# Patient Record
Sex: FEMALE | Race: WHITE | NOT HISPANIC OR LATINO
[De-identification: names, ages, dates, MRNs, and addresses within clinical notes are randomized per-mention and may not be internally consistent; named-entity substitution may affect disease eponyms.]

---

## 2017-01-04 ENCOUNTER — APPOINTMENT (OUTPATIENT)
Dept: CARDIOLOGY | Facility: CLINIC | Age: 82
End: 2017-01-04

## 2017-01-04 ENCOUNTER — NON-APPOINTMENT (OUTPATIENT)
Age: 82
End: 2017-01-04

## 2017-01-04 VITALS
HEIGHT: 58 IN | WEIGHT: 115 LBS | DIASTOLIC BLOOD PRESSURE: 75 MMHG | BODY MASS INDEX: 24.14 KG/M2 | HEART RATE: 59 BPM | SYSTOLIC BLOOD PRESSURE: 136 MMHG

## 2017-08-17 ENCOUNTER — FORM ENCOUNTER (OUTPATIENT)
Age: 82
End: 2017-08-17

## 2017-08-18 ENCOUNTER — APPOINTMENT (OUTPATIENT)
Dept: MAMMOGRAPHY | Facility: CLINIC | Age: 82
End: 2017-08-18

## 2017-08-18 ENCOUNTER — OUTPATIENT (OUTPATIENT)
Dept: OUTPATIENT SERVICES | Facility: HOSPITAL | Age: 82
LOS: 1 days | End: 2017-08-18
Payer: MEDICARE

## 2017-08-18 DIAGNOSIS — Z00.8 ENCOUNTER FOR OTHER GENERAL EXAMINATION: ICD-10-CM

## 2017-08-18 PROCEDURE — 77063 BREAST TOMOSYNTHESIS BI: CPT | Mod: 26

## 2017-08-18 PROCEDURE — 77063 BREAST TOMOSYNTHESIS BI: CPT

## 2017-08-18 PROCEDURE — 77067 SCR MAMMO BI INCL CAD: CPT

## 2017-08-18 PROCEDURE — G0202: CPT | Mod: 26

## 2017-08-21 ENCOUNTER — FORM ENCOUNTER (OUTPATIENT)
Age: 82
End: 2017-08-21

## 2017-08-22 ENCOUNTER — APPOINTMENT (OUTPATIENT)
Dept: SURGICAL ONCOLOGY | Facility: CLINIC | Age: 82
End: 2017-08-22
Payer: MEDICARE

## 2017-08-22 ENCOUNTER — APPOINTMENT (OUTPATIENT)
Dept: ULTRASOUND IMAGING | Facility: IMAGING CENTER | Age: 82
End: 2017-08-22
Payer: MEDICARE

## 2017-08-22 ENCOUNTER — OUTPATIENT (OUTPATIENT)
Dept: OUTPATIENT SERVICES | Facility: HOSPITAL | Age: 82
LOS: 1 days | End: 2017-08-22
Payer: MEDICARE

## 2017-08-22 ENCOUNTER — APPOINTMENT (OUTPATIENT)
Dept: MAMMOGRAPHY | Facility: IMAGING CENTER | Age: 82
End: 2017-08-22
Payer: MEDICARE

## 2017-08-22 VITALS
BODY MASS INDEX: 23.72 KG/M2 | HEART RATE: 77 BPM | DIASTOLIC BLOOD PRESSURE: 79 MMHG | RESPIRATION RATE: 16 BRPM | WEIGHT: 113 LBS | HEIGHT: 58 IN | OXYGEN SATURATION: 97 % | SYSTOLIC BLOOD PRESSURE: 118 MMHG

## 2017-08-22 DIAGNOSIS — Z00.8 ENCOUNTER FOR OTHER GENERAL EXAMINATION: ICD-10-CM

## 2017-08-22 PROCEDURE — G0206: CPT | Mod: 26,LT

## 2017-08-22 PROCEDURE — 77065 DX MAMMO INCL CAD UNI: CPT

## 2017-08-22 PROCEDURE — 76642 ULTRASOUND BREAST LIMITED: CPT

## 2017-08-22 PROCEDURE — G0279: CPT | Mod: 26

## 2017-08-22 PROCEDURE — 99214 OFFICE O/P EST MOD 30 MIN: CPT

## 2017-08-22 PROCEDURE — 76642 ULTRASOUND BREAST LIMITED: CPT | Mod: 26,LT

## 2017-08-22 PROCEDURE — G0279: CPT

## 2017-08-24 ENCOUNTER — APPOINTMENT (OUTPATIENT)
Dept: CARDIOLOGY | Facility: CLINIC | Age: 82
End: 2017-08-24
Payer: MEDICARE

## 2017-08-24 ENCOUNTER — NON-APPOINTMENT (OUTPATIENT)
Age: 82
End: 2017-08-24

## 2017-08-24 VITALS
OXYGEN SATURATION: 96 % | DIASTOLIC BLOOD PRESSURE: 79 MMHG | BODY MASS INDEX: 24.14 KG/M2 | WEIGHT: 115 LBS | HEART RATE: 62 BPM | SYSTOLIC BLOOD PRESSURE: 137 MMHG | HEIGHT: 58 IN

## 2017-08-24 VITALS — DIASTOLIC BLOOD PRESSURE: 72 MMHG | SYSTOLIC BLOOD PRESSURE: 130 MMHG

## 2017-08-24 PROCEDURE — 99215 OFFICE O/P EST HI 40 MIN: CPT

## 2017-08-24 PROCEDURE — 93000 ELECTROCARDIOGRAM COMPLETE: CPT

## 2017-08-26 ENCOUNTER — APPOINTMENT (OUTPATIENT)
Dept: CARDIOLOGY | Facility: CLINIC | Age: 82
End: 2017-08-26
Payer: MEDICARE

## 2017-08-26 PROCEDURE — 93880 EXTRACRANIAL BILAT STUDY: CPT

## 2017-09-08 ENCOUNTER — APPOINTMENT (OUTPATIENT)
Dept: CARDIOLOGY | Facility: CLINIC | Age: 82
End: 2017-09-08
Payer: MEDICARE

## 2017-09-08 PROCEDURE — 93306 TTE W/DOPPLER COMPLETE: CPT

## 2018-02-26 ENCOUNTER — FORM ENCOUNTER (OUTPATIENT)
Age: 83
End: 2018-02-26

## 2018-02-27 ENCOUNTER — OUTPATIENT (OUTPATIENT)
Dept: OUTPATIENT SERVICES | Facility: HOSPITAL | Age: 83
LOS: 1 days | End: 2018-02-27
Payer: MEDICARE

## 2018-02-27 ENCOUNTER — APPOINTMENT (OUTPATIENT)
Dept: ULTRASOUND IMAGING | Facility: IMAGING CENTER | Age: 83
End: 2018-02-27
Payer: MEDICARE

## 2018-02-27 DIAGNOSIS — Z00.8 ENCOUNTER FOR OTHER GENERAL EXAMINATION: ICD-10-CM

## 2018-02-27 PROCEDURE — 76642 ULTRASOUND BREAST LIMITED: CPT | Mod: 26,LT

## 2018-02-27 PROCEDURE — 76642 ULTRASOUND BREAST LIMITED: CPT

## 2018-08-23 ENCOUNTER — FORM ENCOUNTER (OUTPATIENT)
Age: 83
End: 2018-08-23

## 2018-08-24 ENCOUNTER — OUTPATIENT (OUTPATIENT)
Dept: OUTPATIENT SERVICES | Facility: HOSPITAL | Age: 83
LOS: 1 days | End: 2018-08-24
Payer: MEDICARE

## 2018-08-24 ENCOUNTER — APPOINTMENT (OUTPATIENT)
Dept: MAMMOGRAPHY | Facility: CLINIC | Age: 83
End: 2018-08-24

## 2018-08-24 ENCOUNTER — APPOINTMENT (OUTPATIENT)
Dept: ULTRASOUND IMAGING | Facility: CLINIC | Age: 83
End: 2018-08-24

## 2018-08-24 DIAGNOSIS — Z00.8 ENCOUNTER FOR OTHER GENERAL EXAMINATION: ICD-10-CM

## 2018-08-24 PROCEDURE — 76642 ULTRASOUND BREAST LIMITED: CPT | Mod: 26,LT

## 2018-08-24 PROCEDURE — 77067 SCR MAMMO BI INCL CAD: CPT | Mod: 26

## 2018-08-24 PROCEDURE — 77067 SCR MAMMO BI INCL CAD: CPT

## 2018-08-24 PROCEDURE — 77063 BREAST TOMOSYNTHESIS BI: CPT | Mod: 26

## 2018-08-24 PROCEDURE — 76642 ULTRASOUND BREAST LIMITED: CPT

## 2018-08-24 PROCEDURE — 77063 BREAST TOMOSYNTHESIS BI: CPT

## 2018-08-27 ENCOUNTER — NON-APPOINTMENT (OUTPATIENT)
Age: 83
End: 2018-08-27

## 2018-08-27 ENCOUNTER — APPOINTMENT (OUTPATIENT)
Dept: CARDIOLOGY | Facility: CLINIC | Age: 83
End: 2018-08-27
Payer: MEDICARE

## 2018-08-27 VITALS
BODY MASS INDEX: 24.14 KG/M2 | DIASTOLIC BLOOD PRESSURE: 77 MMHG | WEIGHT: 115 LBS | HEART RATE: 67 BPM | HEIGHT: 58 IN | SYSTOLIC BLOOD PRESSURE: 130 MMHG | OXYGEN SATURATION: 98 %

## 2018-08-27 DIAGNOSIS — E04.1 NONTOXIC SINGLE THYROID NODULE: ICD-10-CM

## 2018-08-27 DIAGNOSIS — I65.29 OCCLUSION AND STENOSIS OF UNSPECIFIED CAROTID ARTERY: ICD-10-CM

## 2018-08-27 DIAGNOSIS — E78.5 HYPERLIPIDEMIA, UNSPECIFIED: ICD-10-CM

## 2018-08-27 PROCEDURE — 99215 OFFICE O/P EST HI 40 MIN: CPT

## 2018-08-27 PROCEDURE — 93000 ELECTROCARDIOGRAM COMPLETE: CPT

## 2018-08-27 RX ORDER — ASCORBIC ACID 1000 MG
1000 TABLET ORAL
Refills: 0 | Status: DISCONTINUED | COMMUNITY
End: 2018-08-27

## 2018-08-28 ENCOUNTER — APPOINTMENT (OUTPATIENT)
Dept: SURGICAL ONCOLOGY | Facility: CLINIC | Age: 83
End: 2018-08-28
Payer: MEDICARE

## 2018-08-28 VITALS
HEART RATE: 66 BPM | BODY MASS INDEX: 24.14 KG/M2 | OXYGEN SATURATION: 98 % | HEIGHT: 58 IN | WEIGHT: 115 LBS | DIASTOLIC BLOOD PRESSURE: 79 MMHG | SYSTOLIC BLOOD PRESSURE: 128 MMHG | RESPIRATION RATE: 16 BRPM

## 2018-08-28 DIAGNOSIS — R92.2 INCONCLUSIVE MAMMOGRAM: ICD-10-CM

## 2018-08-28 PROCEDURE — 99213 OFFICE O/P EST LOW 20 MIN: CPT

## 2018-09-06 ENCOUNTER — APPOINTMENT (OUTPATIENT)
Dept: CARDIOLOGY | Facility: CLINIC | Age: 83
End: 2018-09-06
Payer: MEDICARE

## 2018-09-06 PROCEDURE — 93306 TTE W/DOPPLER COMPLETE: CPT

## 2018-09-13 ENCOUNTER — APPOINTMENT (OUTPATIENT)
Dept: ULTRASOUND IMAGING | Facility: IMAGING CENTER | Age: 83
End: 2018-09-13

## 2018-09-13 ENCOUNTER — APPOINTMENT (OUTPATIENT)
Dept: MAMMOGRAPHY | Facility: IMAGING CENTER | Age: 83
End: 2018-09-13

## 2019-07-23 ENCOUNTER — OTHER (OUTPATIENT)
Age: 84
End: 2019-07-23

## 2019-07-26 ENCOUNTER — APPOINTMENT (OUTPATIENT)
Dept: CARDIOLOGY | Facility: CLINIC | Age: 84
End: 2019-07-26
Payer: MEDICARE

## 2019-07-26 PROCEDURE — 93970 EXTREMITY STUDY: CPT

## 2019-08-27 ENCOUNTER — NON-APPOINTMENT (OUTPATIENT)
Age: 84
End: 2019-08-27

## 2019-08-27 ENCOUNTER — APPOINTMENT (OUTPATIENT)
Dept: CARDIOLOGY | Facility: CLINIC | Age: 84
End: 2019-08-27
Payer: MEDICARE

## 2019-08-27 VITALS
SYSTOLIC BLOOD PRESSURE: 162 MMHG | DIASTOLIC BLOOD PRESSURE: 78 MMHG | BODY MASS INDEX: 24.14 KG/M2 | WEIGHT: 115 LBS | HEIGHT: 58 IN | HEART RATE: 61 BPM

## 2019-08-27 PROCEDURE — 99215 OFFICE O/P EST HI 40 MIN: CPT

## 2019-08-27 PROCEDURE — 93000 ELECTROCARDIOGRAM COMPLETE: CPT

## 2019-08-27 NOTE — DISCUSSION/SUMMARY
[FreeTextEntry1] : Mrs. So has recently noted an increased degree of lower extremity swelling (L>R), which improved to a degree after therapy with Norvasc was discontinued on 7/23/19. She has, however, had persistent lower extremity swelling, which I suspect is on the basis of lower extremity venous disease. She does not describe having experienced any signs or symptoms to suggest the development of an anginal syndrome, congestive heart failure, or a hemodynamically-compromising arrhythmia. Her cardiac examination today is unchanged from her previous visit with me, again remarkable for a soft systolic ejection murmur, as well as a mid-systolic click and mid-systolic murmur heard at the lower left sternal border and in the apical region, related to mitral regurgitation in association with her known history of mitral valve prolapse. She is exhibiting 2-3+ minimally pitting left ankle swelling and 1+ non-pitting right ankle swelling on examination today. Her blood pressure reading is moderately elevated today. Her electrocardiogram today reveals sinus rhythm with left axis deviation, a right bundle branch block pattern, and non-specific ST-T wave abnormalities, essentially unchanged from her previous office tracing.\par \par I have recommended to the patient that she continue Lasix at a dosage of 40 mg daily for the time being. I have referred her to a vascular specialist to be evaluated for venous insufficiency of the lower extremities.\par \par In an effort to more optimally control her blood pressure, I have instructed the patient to increase the dosage of losartan from 50 mg daily to 100 mg daily, and I have electronically prescribed a higher dosage of this medication to her pharmacy for her today. I have asked her call me if she should have any difficulty tolerating the increased dosage of losartan.\par \par The patient anticipates having coughing up blood testing performed through the office of her primary care provider, Dr. Palomino, in the near future, including a fasting lipid profile and chemistry screen. I have asked her to have a copy of the report forwarded to my office for my review.\par \par I am referring the patient for follow-up echocardiography at this point to reassess cardiac structural integrity, left ventricular function, valvular morphology and function, and the pulmonary artery systolic pressure. The patient is going to make arrangements to have this study performed throughout her office, and I will telephone her to discuss the findings, once the study has been completed.\par \par The importance of proper dietary habits and regular exercise as tolerated was discussed with the patient today.\par \par I have reviewed the findings of the carotid artery Doppler study of 8/26/17 in detail with the patient today, noting that the study revealed mild plaque formation involving the proximal portion of the left internal carotid artery. In addition, a left-sided thyroid nodule was incidentally discovered, for which the patient has since been following with an endocrinologist.\par \par I have asked the patient to call me should she have any questions or problems pertaining to these matters, and especially if she should experience any concerning symptoms. I have otherwise asked her to return to the office for follow-up cardiac evaluation and blood pressure reassessment in one month, provided she remains clinically stable in the interim.
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
## 2019-08-27 NOTE — PHYSICAL EXAM
[General Appearance - In No Acute Distress] : no acute distress [Normal Conjunctiva] : the conjunctiva exhibited no abnormalities [No Oral Pallor] : no oral pallor [Respiration, Rhythm And Depth] : normal respiratory rhythm and effort [Auscultation Breath Sounds / Voice Sounds] : lungs were clear to auscultation bilaterally [Bowel Sounds] : normal bowel sounds [Abdomen Tenderness] : non-tender [Abnormal Walk] : normal gait [Cyanosis, Localized] : no localized cyanosis [] : no rash [Oriented To Time, Place, And Person] : oriented to person, place, and time [Not Palpable] : not palpable [No Precordial Heave] : no precordial heave was noted [Normal Rate] : normal [Rhythm Regular] : regular [Normal S1] : normal S1 [Normal S2] : normal S2 [No Gallop] : no gallop heard [Click] : a ~M click was heard [Mid] : mid [I] : a grade 1 [2+] : left 2+ [No Abnormalities] : the abdominal aorta was not enlarged and no bruit was heard [Nonpitting Edema] : nonpitting edema present [Rt] : varicose veins of the right leg noted [Lt] : varicose veins of the left leg noted [FreeTextEntry1] : no significant JVD is appreciated at a 45° angle [Apical Thrill] : no thrill palpable at the apex [Pericardial Rub] : no pericardial rub [Right Carotid Bruit] : no bruit heard over the right carotid [Left Carotid Bruit] : no bruit heard over the left carotid

## 2019-09-04 ENCOUNTER — APPOINTMENT (OUTPATIENT)
Dept: CARDIOLOGY | Facility: CLINIC | Age: 84
End: 2019-09-04
Payer: MEDICARE

## 2019-09-04 PROCEDURE — 93306 TTE W/DOPPLER COMPLETE: CPT

## 2019-09-12 ENCOUNTER — FORM ENCOUNTER (OUTPATIENT)
Age: 84
End: 2019-09-12

## 2019-09-13 ENCOUNTER — APPOINTMENT (OUTPATIENT)
Dept: ULTRASOUND IMAGING | Facility: IMAGING CENTER | Age: 84
End: 2019-09-13
Payer: MEDICARE

## 2019-09-13 ENCOUNTER — OUTPATIENT (OUTPATIENT)
Dept: OUTPATIENT SERVICES | Facility: HOSPITAL | Age: 84
LOS: 1 days | End: 2019-09-13
Payer: MEDICARE

## 2019-09-13 ENCOUNTER — APPOINTMENT (OUTPATIENT)
Dept: MAMMOGRAPHY | Facility: IMAGING CENTER | Age: 84
End: 2019-09-13
Payer: MEDICARE

## 2019-09-13 DIAGNOSIS — Z00.8 ENCOUNTER FOR OTHER GENERAL EXAMINATION: ICD-10-CM

## 2019-09-13 PROCEDURE — 76641 ULTRASOUND BREAST COMPLETE: CPT | Mod: 26,50

## 2019-09-13 PROCEDURE — 77067 SCR MAMMO BI INCL CAD: CPT

## 2019-09-13 PROCEDURE — 77063 BREAST TOMOSYNTHESIS BI: CPT | Mod: 26

## 2019-09-13 PROCEDURE — 77067 SCR MAMMO BI INCL CAD: CPT | Mod: 26

## 2019-09-13 PROCEDURE — 77063 BREAST TOMOSYNTHESIS BI: CPT

## 2019-09-13 PROCEDURE — 76641 ULTRASOUND BREAST COMPLETE: CPT

## 2019-09-17 ENCOUNTER — APPOINTMENT (OUTPATIENT)
Dept: SURGICAL ONCOLOGY | Facility: CLINIC | Age: 84
End: 2019-09-17

## 2019-09-24 ENCOUNTER — TRANSCRIPTION ENCOUNTER (OUTPATIENT)
Age: 84
End: 2019-09-24

## 2019-09-24 ENCOUNTER — INPATIENT (INPATIENT)
Facility: HOSPITAL | Age: 84
LOS: 3 days | Discharge: HOME | End: 2019-09-28
Payer: MEDICARE

## 2019-09-24 VITALS
TEMPERATURE: 98 F | RESPIRATION RATE: 18 BRPM | OXYGEN SATURATION: 100 % | HEART RATE: 68 BPM | DIASTOLIC BLOOD PRESSURE: 81 MMHG | SYSTOLIC BLOOD PRESSURE: 139 MMHG

## 2019-09-24 DIAGNOSIS — K62.3 RECTAL PROLAPSE: ICD-10-CM

## 2019-09-24 LAB
ANION GAP SERPL CALC-SCNC: 16 MMO/L — HIGH (ref 7–14)
APPEARANCE UR: CLEAR — SIGNIFICANT CHANGE UP
APTT BLD: 28.2 SEC — SIGNIFICANT CHANGE UP (ref 27.5–36.3)
BILIRUB UR-MCNC: NEGATIVE — SIGNIFICANT CHANGE UP
BLD GP AB SCN SERPL QL: NEGATIVE — SIGNIFICANT CHANGE UP
BLOOD UR QL VISUAL: NEGATIVE — SIGNIFICANT CHANGE UP
BUN SERPL-MCNC: 17 MG/DL — SIGNIFICANT CHANGE UP (ref 7–23)
CALCIUM SERPL-MCNC: 9.8 MG/DL — SIGNIFICANT CHANGE UP (ref 8.4–10.5)
CHLORIDE SERPL-SCNC: 99 MMOL/L — SIGNIFICANT CHANGE UP (ref 98–107)
CO2 SERPL-SCNC: 23 MMOL/L — SIGNIFICANT CHANGE UP (ref 22–31)
COLOR SPEC: COLORLESS — SIGNIFICANT CHANGE UP
CREAT SERPL-MCNC: 0.74 MG/DL — SIGNIFICANT CHANGE UP (ref 0.5–1.3)
GLUCOSE SERPL-MCNC: 108 MG/DL — HIGH (ref 70–99)
GLUCOSE UR-MCNC: NEGATIVE — SIGNIFICANT CHANGE UP
HCT VFR BLD CALC: 39.7 % — SIGNIFICANT CHANGE UP (ref 34.5–45)
HGB BLD-MCNC: 13.3 G/DL — SIGNIFICANT CHANGE UP (ref 11.5–15.5)
INR BLD: 0.99 — SIGNIFICANT CHANGE UP (ref 0.88–1.17)
KETONES UR-MCNC: NEGATIVE — SIGNIFICANT CHANGE UP
LEUKOCYTE ESTERASE UR-ACNC: NEGATIVE — SIGNIFICANT CHANGE UP
MAGNESIUM SERPL-MCNC: 2.4 MG/DL — SIGNIFICANT CHANGE UP (ref 1.6–2.6)
MCHC RBC-ENTMCNC: 32.2 PG — SIGNIFICANT CHANGE UP (ref 27–34)
MCHC RBC-ENTMCNC: 33.5 % — SIGNIFICANT CHANGE UP (ref 32–36)
MCV RBC AUTO: 96.1 FL — SIGNIFICANT CHANGE UP (ref 80–100)
NITRITE UR-MCNC: NEGATIVE — SIGNIFICANT CHANGE UP
NRBC # FLD: 0.03 K/UL — SIGNIFICANT CHANGE UP (ref 0–0)
PH UR: 8 — SIGNIFICANT CHANGE UP (ref 5–8)
PHOSPHATE SERPL-MCNC: 3.2 MG/DL — SIGNIFICANT CHANGE UP (ref 2.5–4.5)
PLATELET # BLD AUTO: 337 K/UL — SIGNIFICANT CHANGE UP (ref 150–400)
PMV BLD: 9.7 FL — SIGNIFICANT CHANGE UP (ref 7–13)
POTASSIUM SERPL-MCNC: 4.2 MMOL/L — SIGNIFICANT CHANGE UP (ref 3.5–5.3)
POTASSIUM SERPL-SCNC: 4.2 MMOL/L — SIGNIFICANT CHANGE UP (ref 3.5–5.3)
PROT UR-MCNC: NEGATIVE — SIGNIFICANT CHANGE UP
PROTHROM AB SERPL-ACNC: 11.3 SEC — SIGNIFICANT CHANGE UP (ref 9.8–13.1)
RBC # BLD: 4.13 M/UL — SIGNIFICANT CHANGE UP (ref 3.8–5.2)
RBC # FLD: 13.4 % — SIGNIFICANT CHANGE UP (ref 10.3–14.5)
RH IG SCN BLD-IMP: POSITIVE — SIGNIFICANT CHANGE UP
RH IG SCN BLD-IMP: POSITIVE — SIGNIFICANT CHANGE UP
SODIUM SERPL-SCNC: 138 MMOL/L — SIGNIFICANT CHANGE UP (ref 135–145)
SP GR SPEC: 1 — SIGNIFICANT CHANGE UP (ref 1–1.04)
UROBILINOGEN FLD QL: NORMAL — SIGNIFICANT CHANGE UP
WBC # BLD: 5.66 K/UL — SIGNIFICANT CHANGE UP (ref 3.8–10.5)
WBC # FLD AUTO: 5.66 K/UL — SIGNIFICANT CHANGE UP (ref 3.8–10.5)

## 2019-09-24 PROCEDURE — 71046 X-RAY EXAM CHEST 2 VIEWS: CPT | Mod: 26

## 2019-09-24 PROCEDURE — 71045 X-RAY EXAM CHEST 1 VIEW: CPT | Mod: 26

## 2019-09-24 PROCEDURE — 93010 ELECTROCARDIOGRAM REPORT: CPT

## 2019-09-24 RX ORDER — INFLUENZA VIRUS VACCINE 15; 15; 15; 15 UG/.5ML; UG/.5ML; UG/.5ML; UG/.5ML
0.5 SUSPENSION INTRAMUSCULAR ONCE
Refills: 0 | Status: COMPLETED | OUTPATIENT
Start: 2019-09-24 | End: 2019-09-28

## 2019-09-24 RX ORDER — HEPARIN SODIUM 5000 [USP'U]/ML
5000 INJECTION INTRAVENOUS; SUBCUTANEOUS EVERY 8 HOURS
Refills: 0 | Status: DISCONTINUED | OUTPATIENT
Start: 2019-09-24 | End: 2019-09-25

## 2019-09-24 RX ORDER — LOSARTAN POTASSIUM 100 MG/1
50 TABLET, FILM COATED ORAL DAILY
Refills: 0 | Status: DISCONTINUED | OUTPATIENT
Start: 2019-09-25 | End: 2019-09-28

## 2019-09-24 RX ORDER — METRONIDAZOLE 500 MG
500 TABLET ORAL
Refills: 0 | Status: DISCONTINUED | OUTPATIENT
Start: 2019-09-24 | End: 2019-09-25

## 2019-09-24 RX ORDER — SOD SULF/SODIUM/NAHCO3/KCL/PEG
2000 SOLUTION, RECONSTITUTED, ORAL ORAL ONCE
Refills: 0 | Status: COMPLETED | OUTPATIENT
Start: 2019-09-24 | End: 2019-09-24

## 2019-09-24 RX ORDER — SODIUM CHLORIDE 9 MG/ML
1000 INJECTION, SOLUTION INTRAVENOUS
Refills: 0 | Status: DISCONTINUED | OUTPATIENT
Start: 2019-09-24 | End: 2019-09-26

## 2019-09-24 RX ORDER — NEOMYCIN SULFATE 500 MG/1
1000 TABLET ORAL
Refills: 0 | Status: DISCONTINUED | OUTPATIENT
Start: 2019-09-24 | End: 2019-09-25

## 2019-09-24 RX ADMIN — SODIUM CHLORIDE 50 MILLILITER(S): 9 INJECTION, SOLUTION INTRAVENOUS at 14:00

## 2019-09-24 RX ADMIN — Medication 500 MILLIGRAM(S): at 15:11

## 2019-09-24 RX ADMIN — NEOMYCIN SULFATE 1000 MILLIGRAM(S): 500 TABLET ORAL at 15:10

## 2019-09-24 RX ADMIN — Medication 2000 MILLILITER(S): at 16:18

## 2019-09-24 RX ADMIN — HEPARIN SODIUM 5000 UNIT(S): 5000 INJECTION INTRAVENOUS; SUBCUTANEOUS at 22:18

## 2019-09-24 RX ADMIN — Medication 500 MILLIGRAM(S): at 22:18

## 2019-09-24 RX ADMIN — HEPARIN SODIUM 5000 UNIT(S): 5000 INJECTION INTRAVENOUS; SUBCUTANEOUS at 15:32

## 2019-09-24 RX ADMIN — Medication 500 MILLIGRAM(S): at 15:32

## 2019-09-24 RX ADMIN — NEOMYCIN SULFATE 1000 MILLIGRAM(S): 500 TABLET ORAL at 15:31

## 2019-09-24 NOTE — H&P ADULT - NSHPPHYSICALEXAM_GEN_ALL_CORE
PHYSICAL EXAM:  GENERAL: NAD, well-developed  HEAD:  Atraumatic, Normocephalic  HEENT: EOMI, PERRLA, conjunctiva and sclera clear. R ear deafness, L ear w'/ functional hearing assist device  NECK: Supple, No JVD  CHEST/LUNG: No resp. distress. No accessory muscle usage  ABDOMEN: Soft, Nontender, Nondistended;   EXTREMITIES:  2+ Peripheral Pulses, No clubbing, cyanosis, or edema  PSYCH: AAOx3  NEUROLOGY: non-focal  SKIN: No rashes or lesions

## 2019-09-24 NOTE — PATIENT PROFILE ADULT - ABILITY TO HEAR (WITH HEARING AID OR HEARING APPLIANCE IF NORMALLY USED):
hearing aid left ear and R ear completely no hearing./Mildly to Moderately Impaired: difficulty hearing in some environments or speaker may need to increase volume or speak distinctly

## 2019-09-24 NOTE — H&P ADULT - NSICDXPASTSURGICALHX_GEN_ALL_CORE_FT
PAST SURGICAL HISTORY:  H/O:  Section 1957, 1060, 1963    Left Cataract     Left Stapedectomy     LIH Repair     Otosclerosis of left ear stapedectomy-     Otosclerosis of right ear S/P Stapedectomy- 10/12/1978    Right  Cataract  Excision     Right Stapedectomy     S/P colonoscopy with polypectomy times 2    S/P hernia repair ventral hernia with mesh- 10/2012    S/P left inguinal hernia repair with mesh     Surgical Treatment Otosclerosis  ? "Mobilization of Stapes"

## 2019-09-24 NOTE — H&P ADULT - HISTORY OF PRESENT ILLNESS
Pt. 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 Pt. 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   Here today as a direct admission for a prolapse resection. Pt. 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   She presents here today as a direct admission for a perineal approach prolapse resection w/ Dr. Vázquez. Pt. 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   She presents here today as a direct admission for a perineal approach prolapse resection w/ Dr. Vázquez after having had prolapse w/ bleeding for the past couple of weeks.  Denies HA, lightheadedness, palpitations, chest pain, SOB. Also denies N/V/D, any change in quality or frequency of stools.  She denies pain. Her daughter s

## 2019-09-24 NOTE — H&P ADULT - NSHPLABSRESULTS_GEN_ALL_CORE
Objective:  Vital Signs Last 24 Hrs  T(C): 36.3 (25 Sep 2019 02:06), Max: 36.8 (24 Sep 2019 15:11)  T(F): 97.4 (25 Sep 2019 02:06), Max: 98.2 (24 Sep 2019 15:11)  HR: 61 (25 Sep 2019 02:06) (57 - 68)  BP: 136/79 (25 Sep 2019 02:06) (131/74 - 156/77)  BP(mean): --  RR: 16 (25 Sep 2019 02:06) (16 - 18)  SpO2: 99% (25 Sep 2019 02:06) (98% - 100%)    I&O's Detail    24 Sep 2019 07:01  -  25 Sep 2019 04:41  --------------------------------------------------------  IN:    lactated ringers.: 950 mL    Oral Fluid: 240 mL  Total IN: 1190 mL    OUT:    Voided: 650 mL  Total OUT: 650 mL    Total NET: 540 mL          MEDICATIONS  (STANDING):  heparin  Injectable 5000 Unit(s) SubCutaneous every 8 hours  influenza   Vaccine 0.5 milliLiter(s) IntraMuscular once  lactated ringers. 1000 milliLiter(s) (50 mL/Hr) IV Continuous <Continuous>  losartan 50 milliGRAM(s) Oral daily  metroNIDAZOLE    Tablet 500 milliGRAM(s) Oral <User Schedule>  neomycin 1000 milliGRAM(s) Oral <User Schedule>    MEDICATIONS  (PRN):                            13.3   5.66  )-----------( 337      ( 24 Sep 2019 12:15 )             39.7       09-24    140  |  102  |  14  ----------------------------<  88  3.8   |  26  |  0.66    Ca    9.2      24 Sep 2019 23:30  Phos  2.8     09-24  Mg     2.3     09-24

## 2019-09-24 NOTE — CHART NOTE - NSCHARTNOTEFT_GEN_A_CORE
A Team Surgery    Spoke with pt cardiologist, Dr. Ogden, at [phone].  Pt went to cardiologist on 8/27 for annual appointment.  Echo performed unchanged from 9/6/18, EF 62%.  Pt in agreement with medical doctor (Dr. Villegas) clearance and has no objections to proceeding with planned procedure.    #[id number]

## 2019-09-24 NOTE — H&P ADULT - NSICDXPASTMEDICALHX_GEN_ALL_CORE_FT
PAST MEDICAL HISTORY:  Age Related Osteoporosis     Fungal infection left foot ; tx with cream ; x 3 weeks ; pt reports improvement    Heart murmur     History of Hypertension     [place] (hard of hearing)     MVP (mitral valve prolapse)     Otosclerosis

## 2019-09-25 ENCOUNTER — RESULT REVIEW (OUTPATIENT)
Age: 84
End: 2019-09-25

## 2019-09-25 ENCOUNTER — APPOINTMENT (OUTPATIENT)
Dept: CARDIOLOGY | Facility: CLINIC | Age: 84
End: 2019-09-25

## 2019-09-25 LAB
ANION GAP SERPL CALC-SCNC: 12 MMO/L — SIGNIFICANT CHANGE UP (ref 7–14)
ANION GAP SERPL CALC-SCNC: 15 MMO/L — HIGH (ref 7–14)
APTT BLD: 31.2 SEC — SIGNIFICANT CHANGE UP (ref 27.5–36.3)
BLD GP AB SCN SERPL QL: NEGATIVE — SIGNIFICANT CHANGE UP
BUN SERPL-MCNC: 11 MG/DL — SIGNIFICANT CHANGE UP (ref 7–23)
BUN SERPL-MCNC: 14 MG/DL — SIGNIFICANT CHANGE UP (ref 7–23)
CALCIUM SERPL-MCNC: 8.7 MG/DL — SIGNIFICANT CHANGE UP (ref 8.4–10.5)
CALCIUM SERPL-MCNC: 9.2 MG/DL — SIGNIFICANT CHANGE UP (ref 8.4–10.5)
CHLORIDE SERPL-SCNC: 102 MMOL/L — SIGNIFICANT CHANGE UP (ref 98–107)
CHLORIDE SERPL-SCNC: 103 MMOL/L — SIGNIFICANT CHANGE UP (ref 98–107)
CO2 SERPL-SCNC: 24 MMOL/L — SIGNIFICANT CHANGE UP (ref 22–31)
CO2 SERPL-SCNC: 26 MMOL/L — SIGNIFICANT CHANGE UP (ref 22–31)
CREAT SERPL-MCNC: 0.66 MG/DL — SIGNIFICANT CHANGE UP (ref 0.5–1.3)
CREAT SERPL-MCNC: 0.69 MG/DL — SIGNIFICANT CHANGE UP (ref 0.5–1.3)
GLUCOSE SERPL-MCNC: 85 MG/DL — SIGNIFICANT CHANGE UP (ref 70–99)
GLUCOSE SERPL-MCNC: 88 MG/DL — SIGNIFICANT CHANGE UP (ref 70–99)
HCT VFR BLD CALC: 37.3 % — SIGNIFICANT CHANGE UP (ref 34.5–45)
HGB BLD-MCNC: 12.5 G/DL — SIGNIFICANT CHANGE UP (ref 11.5–15.5)
INR BLD: 1.07 — SIGNIFICANT CHANGE UP (ref 0.88–1.17)
MAGNESIUM SERPL-MCNC: 2.2 MG/DL — SIGNIFICANT CHANGE UP (ref 1.6–2.6)
MAGNESIUM SERPL-MCNC: 2.3 MG/DL — SIGNIFICANT CHANGE UP (ref 1.6–2.6)
MCHC RBC-ENTMCNC: 32.3 PG — SIGNIFICANT CHANGE UP (ref 27–34)
MCHC RBC-ENTMCNC: 33.5 % — SIGNIFICANT CHANGE UP (ref 32–36)
MCV RBC AUTO: 96.4 FL — SIGNIFICANT CHANGE UP (ref 80–100)
NRBC # FLD: 0 K/UL — SIGNIFICANT CHANGE UP (ref 0–0)
PHOSPHATE SERPL-MCNC: 2.6 MG/DL — SIGNIFICANT CHANGE UP (ref 2.5–4.5)
PHOSPHATE SERPL-MCNC: 2.8 MG/DL — SIGNIFICANT CHANGE UP (ref 2.5–4.5)
PLATELET # BLD AUTO: 318 K/UL — SIGNIFICANT CHANGE UP (ref 150–400)
PMV BLD: 9.5 FL — SIGNIFICANT CHANGE UP (ref 7–13)
POTASSIUM SERPL-MCNC: 3.8 MMOL/L — SIGNIFICANT CHANGE UP (ref 3.5–5.3)
POTASSIUM SERPL-MCNC: 3.8 MMOL/L — SIGNIFICANT CHANGE UP (ref 3.5–5.3)
POTASSIUM SERPL-SCNC: 3.8 MMOL/L — SIGNIFICANT CHANGE UP (ref 3.5–5.3)
POTASSIUM SERPL-SCNC: 3.8 MMOL/L — SIGNIFICANT CHANGE UP (ref 3.5–5.3)
PROTHROM AB SERPL-ACNC: 12.2 SEC — SIGNIFICANT CHANGE UP (ref 9.8–13.1)
RBC # BLD: 3.87 M/UL — SIGNIFICANT CHANGE UP (ref 3.8–5.2)
RBC # FLD: 13.3 % — SIGNIFICANT CHANGE UP (ref 10.3–14.5)
RH IG SCN BLD-IMP: POSITIVE — SIGNIFICANT CHANGE UP
SODIUM SERPL-SCNC: 140 MMOL/L — SIGNIFICANT CHANGE UP (ref 135–145)
SODIUM SERPL-SCNC: 142 MMOL/L — SIGNIFICANT CHANGE UP (ref 135–145)
WBC # BLD: 5.04 K/UL — SIGNIFICANT CHANGE UP (ref 3.8–10.5)
WBC # FLD AUTO: 5.04 K/UL — SIGNIFICANT CHANGE UP (ref 3.8–10.5)

## 2019-09-25 PROCEDURE — 71250 CT THORAX DX C-: CPT | Mod: 26

## 2019-09-25 PROCEDURE — 88304 TISSUE EXAM BY PATHOLOGIST: CPT | Mod: 26

## 2019-09-25 PROCEDURE — 93010 ELECTROCARDIOGRAM REPORT: CPT

## 2019-09-25 RX ORDER — POTASSIUM PHOSPHATE, MONOBASIC POTASSIUM PHOSPHATE, DIBASIC 236; 224 MG/ML; MG/ML
15 INJECTION, SOLUTION INTRAVENOUS ONCE
Refills: 0 | Status: COMPLETED | OUTPATIENT
Start: 2019-09-25 | End: 2019-09-25

## 2019-09-25 RX ORDER — ONDANSETRON 8 MG/1
4 TABLET, FILM COATED ORAL ONCE
Refills: 0 | Status: DISCONTINUED | OUTPATIENT
Start: 2019-09-25 | End: 2019-09-25

## 2019-09-25 RX ORDER — ACETAMINOPHEN 500 MG
1000 TABLET ORAL EVERY 6 HOURS
Refills: 0 | Status: DISCONTINUED | OUTPATIENT
Start: 2019-09-25 | End: 2019-09-25

## 2019-09-25 RX ORDER — ACETAMINOPHEN 500 MG
750 TABLET ORAL EVERY 6 HOURS
Refills: 0 | Status: COMPLETED | OUTPATIENT
Start: 2019-09-25 | End: 2019-09-26

## 2019-09-25 RX ORDER — CIPROFLOXACIN LACTATE 400MG/40ML
400 VIAL (ML) INTRAVENOUS EVERY 12 HOURS
Refills: 0 | Status: DISCONTINUED | OUTPATIENT
Start: 2019-09-25 | End: 2019-09-28

## 2019-09-25 RX ORDER — FENTANYL CITRATE 50 UG/ML
25 INJECTION INTRAVENOUS
Refills: 0 | Status: DISCONTINUED | OUTPATIENT
Start: 2019-09-25 | End: 2019-09-25

## 2019-09-25 RX ORDER — CHLORHEXIDINE GLUCONATE 213 G/1000ML
1 SOLUTION TOPICAL DAILY
Refills: 0 | Status: DISCONTINUED | OUTPATIENT
Start: 2019-09-25 | End: 2019-09-25

## 2019-09-25 RX ORDER — METRONIDAZOLE 500 MG
500 TABLET ORAL EVERY 8 HOURS
Refills: 0 | Status: DISCONTINUED | OUTPATIENT
Start: 2019-09-25 | End: 2019-09-28

## 2019-09-25 RX ORDER — MORPHINE SULFATE 50 MG/1
2 CAPSULE, EXTENDED RELEASE ORAL EVERY 4 HOURS
Refills: 0 | Status: DISCONTINUED | OUTPATIENT
Start: 2019-09-25 | End: 2019-09-27

## 2019-09-25 RX ORDER — HEPARIN SODIUM 5000 [USP'U]/ML
5000 INJECTION INTRAVENOUS; SUBCUTANEOUS EVERY 8 HOURS
Refills: 0 | Status: DISCONTINUED | OUTPATIENT
Start: 2019-09-25 | End: 2019-09-28

## 2019-09-25 RX ADMIN — NEOMYCIN SULFATE 1000 MILLIGRAM(S): 500 TABLET ORAL at 00:36

## 2019-09-25 RX ADMIN — Medication 200 MILLIGRAM(S): at 23:41

## 2019-09-25 RX ADMIN — Medication 100 MILLIGRAM(S): at 21:04

## 2019-09-25 RX ADMIN — Medication 400 MILLIGRAM(S): at 17:20

## 2019-09-25 RX ADMIN — HEPARIN SODIUM 5000 UNIT(S): 5000 INJECTION INTRAVENOUS; SUBCUTANEOUS at 21:04

## 2019-09-25 RX ADMIN — Medication 400 MILLIGRAM(S): at 23:41

## 2019-09-25 RX ADMIN — HEPARIN SODIUM 5000 UNIT(S): 5000 INJECTION INTRAVENOUS; SUBCUTANEOUS at 06:09

## 2019-09-25 RX ADMIN — Medication 750 MILLIGRAM(S): at 18:01

## 2019-09-25 RX ADMIN — SODIUM CHLORIDE 50 MILLILITER(S): 9 INJECTION, SOLUTION INTRAVENOUS at 14:50

## 2019-09-25 RX ADMIN — SODIUM CHLORIDE 50 MILLILITER(S): 9 INJECTION, SOLUTION INTRAVENOUS at 10:32

## 2019-09-25 RX ADMIN — POTASSIUM PHOSPHATE, MONOBASIC POTASSIUM PHOSPHATE, DIBASIC 62.5 MILLIMOLE(S): 236; 224 INJECTION, SOLUTION INTRAVENOUS at 10:31

## 2019-09-25 RX ADMIN — LOSARTAN POTASSIUM 50 MILLIGRAM(S): 100 TABLET, FILM COATED ORAL at 06:09

## 2019-09-25 RX ADMIN — FENTANYL CITRATE 25 MICROGRAM(S): 50 INJECTION INTRAVENOUS at 15:35

## 2019-09-25 RX ADMIN — FENTANYL CITRATE 25 MICROGRAM(S): 50 INJECTION INTRAVENOUS at 15:45

## 2019-09-25 NOTE — PROGRESS NOTE ADULT - ASSESSMENT
A/P: 86y Female planned for above procedure. She is doing well at the present moment and has no acute complaints. She was a direct admission to the hospital on 9/24/2019. She will be going to the OR 9/25/2019.    NPO past midnight, except medications  Movi Bowel Prep, with flagyl and   Pain/nausea control  AM labs  Consent in chart  losartan  metroNIDAZOLE    Tablet  neomycin

## 2019-09-25 NOTE — CHART NOTE - NSCHARTNOTEFT_GEN_A_CORE
GENERAL SURGERY PROGRESS NOTE:    Interval:  No acute events since op.    Subjective:  Patient seen and examined sp Altemeier procedure and levatorplasty. Denies pain. Endorses throat discomfort, understands from intubation, and discomfort 2/2 kruger. Counselled about kruger. No other complaints. Denies fever. Denies HA/CP/SOB. Denies N/V/D. -Passing flatus. -BM. -OOB.    Vital Signs Last 24 Hrs  T(C): 37.3 (25 Sep 2019 18:42), Max: 37.3 (25 Sep 2019 18:42)  T(F): 99.2 (25 Sep 2019 18:42), Max: 99.2 (25 Sep 2019 18:42)  HR: 80 (25 Sep 2019 18:42) (57 - 80)  BP: 128/77 (25 Sep 2019 18:42) (118/65 - 170/93)  BP(mean): 83 (25 Sep 2019 17:15) (83 - 91)  RR: 17 (25 Sep 2019 18:42) (12 - 18)  SpO2: 98% (25 Sep 2019 18:42) (92% - 100%)    Exam:  Gen: NAD, resting in bed, alert and responding appropriately  Resp: Airway patent, non-labored respirations  Abd: Scar Soft, ND, NTTP x 4 quadrants, no rebound or guarding.   : Kruger  Rectum: Dressing cdi NTTP  Ext: No edema, WWP  Neuro: AAOx3, no focal deficits    I&O's Detail    24 Sep 2019 07:  -  25 Sep 2019 07:00  --------------------------------------------------------  IN:    lactated ringers.: 1150 mL    Oral Fluid: 240 mL  Total IN: 1390 mL    OUT:    Voided: 650 mL  Total OUT: 650 mL    Total NET: 740 mL      25 Sep 2019 07:01  -  25 Sep 2019 19:33  --------------------------------------------------------  IN:    IV PiggyBack: 325 mL    lactated ringers.: 375 mL  Total IN: 700 mL    OUT:    Estimated Blood Loss: 20 mL    Indwelling Catheter - Urethral: 255 mL  Total OUT: 275 mL    Total NET: 425 mL          Daily Height in cm: 152.4 (25 Sep 2019 03:37)    Daily     MEDICATIONS  (STANDING):  acetaminophen  IVPB .. 750 milliGRAM(s) IV Intermittent every 6 hours  ciprofloxacin   IVPB 400 milliGRAM(s) IV Intermittent every 12 hours  heparin  Injectable 5000 Unit(s) SubCutaneous every 8 hours  influenza   Vaccine 0.5 milliLiter(s) IntraMuscular once  lactated ringers. 1000 milliLiter(s) (50 mL/Hr) IV Continuous <Continuous>  losartan 50 milliGRAM(s) Oral daily  metroNIDAZOLE  IVPB 500 milliGRAM(s) IV Intermittent every 8 hours    MEDICATIONS  (PRN):  morphine  - Injectable 2 milliGRAM(s) IV Push every 4 hours PRN Severe Pain (7 - 10)      LABS:                        12.5   5.04  )-----------( 318      ( 25 Sep 2019 06:40 )             37.3         142  |  103  |  11  ----------------------------<  85  3.8   |  24  |  0.69    Ca    8.7      25 Sep 2019 06:40  Phos  2.6       Mg     2.2           PT/INR - ( 25 Sep 2019 06:40 )   PT: 12.2 SEC;   INR: 1.07          PTT - ( 25 Sep 2019 06:40 )  PTT:31.2 SEC  Urinalysis Basic - ( 24 Sep 2019 17:00 )    Color: COLORLESS / Appearance: CLEAR / S.005 / pH: 8.0  Gluc: NEGATIVE / Ketone: NEGATIVE  / Bili: NEGATIVE / Urobili: NORMAL   Blood: NEGATIVE / Protein: NEGATIVE / Nitrite: NEGATIVE   Leuk Esterase: NEGATIVE / RBC: x / WBC x   Sq Epi: x / Non Sq Epi: x / Bacteria: x    86F sp Altemeier procedure and levatorplasty    Plan:   - NPO  - IVF @50  - Kruger  - Tylenol morphine  - Change dressing   - If dressing falls off tonight  needs a lot of bacitracin for change  - Subq hep  - Cipro dionna Yuan, PGY-1  A Team Surgery  [id number] with any questions

## 2019-09-25 NOTE — BRIEF OPERATIVE NOTE - NSICDXBRIEFPROCEDURE_GEN_ALL_CORE_FT
PROCEDURES:  Levatorplasty, pelvic floor 25-Sep-2019 14:55:15  Cyndee Sharp  Rectosigmoidectomy 25-Sep-2019 14:55:05  Cyndee Sharp

## 2019-09-25 NOTE — PROGRESS NOTE ADULT - ASSESSMENT
86F w rectal prolapse    Plan:  - Non con CT chest  - OR today, 9/25 86F w rectal prolapse    Plan:  - Non con CT chest  - OR today, 9/25  - Repleted K and Phos for incidentally found low K and Phos

## 2019-09-26 LAB
ANION GAP SERPL CALC-SCNC: 9 MMO/L — SIGNIFICANT CHANGE UP (ref 7–14)
BUN SERPL-MCNC: 9 MG/DL — SIGNIFICANT CHANGE UP (ref 7–23)
CALCIUM SERPL-MCNC: 8 MG/DL — LOW (ref 8.4–10.5)
CHLORIDE SERPL-SCNC: 106 MMOL/L — SIGNIFICANT CHANGE UP (ref 98–107)
CO2 SERPL-SCNC: 25 MMOL/L — SIGNIFICANT CHANGE UP (ref 22–31)
CREAT SERPL-MCNC: 0.76 MG/DL — SIGNIFICANT CHANGE UP (ref 0.5–1.3)
GLUCOSE SERPL-MCNC: 93 MG/DL — SIGNIFICANT CHANGE UP (ref 70–99)
HCT VFR BLD CALC: 33.8 % — LOW (ref 34.5–45)
HGB BLD-MCNC: 11.4 G/DL — LOW (ref 11.5–15.5)
MAGNESIUM SERPL-MCNC: 2 MG/DL — SIGNIFICANT CHANGE UP (ref 1.6–2.6)
MCHC RBC-ENTMCNC: 32 PG — SIGNIFICANT CHANGE UP (ref 27–34)
MCHC RBC-ENTMCNC: 33.7 % — SIGNIFICANT CHANGE UP (ref 32–36)
MCV RBC AUTO: 94.9 FL — SIGNIFICANT CHANGE UP (ref 80–100)
NRBC # FLD: 0 K/UL — SIGNIFICANT CHANGE UP (ref 0–0)
PHOSPHATE SERPL-MCNC: 2.7 MG/DL — SIGNIFICANT CHANGE UP (ref 2.5–4.5)
PLATELET # BLD AUTO: 281 K/UL — SIGNIFICANT CHANGE UP (ref 150–400)
PMV BLD: 9.6 FL — SIGNIFICANT CHANGE UP (ref 7–13)
POTASSIUM SERPL-MCNC: 3.3 MMOL/L — LOW (ref 3.5–5.3)
POTASSIUM SERPL-SCNC: 3.3 MMOL/L — LOW (ref 3.5–5.3)
RBC # BLD: 3.56 M/UL — LOW (ref 3.8–5.2)
RBC # FLD: 13.2 % — SIGNIFICANT CHANGE UP (ref 10.3–14.5)
SODIUM SERPL-SCNC: 140 MMOL/L — SIGNIFICANT CHANGE UP (ref 135–145)
WBC # BLD: 7.49 K/UL — SIGNIFICANT CHANGE UP (ref 3.8–10.5)
WBC # FLD AUTO: 7.49 K/UL — SIGNIFICANT CHANGE UP (ref 3.8–10.5)

## 2019-09-26 RX ORDER — BENZOCAINE AND MENTHOL 5; 1 G/100ML; G/100ML
1 LIQUID ORAL ONCE
Refills: 0 | Status: DISCONTINUED | OUTPATIENT
Start: 2019-09-26 | End: 2019-09-26

## 2019-09-26 RX ORDER — DEXTROSE MONOHYDRATE, SODIUM CHLORIDE, AND POTASSIUM CHLORIDE 50; .745; 4.5 G/1000ML; G/1000ML; G/1000ML
1000 INJECTION, SOLUTION INTRAVENOUS
Refills: 0 | Status: DISCONTINUED | OUTPATIENT
Start: 2019-09-26 | End: 2019-09-27

## 2019-09-26 RX ORDER — BENZOCAINE AND MENTHOL 5; 1 G/100ML; G/100ML
1 LIQUID ORAL ONCE
Refills: 0 | Status: COMPLETED | OUTPATIENT
Start: 2019-09-26 | End: 2019-09-26

## 2019-09-26 RX ORDER — POTASSIUM CHLORIDE 20 MEQ
10 PACKET (EA) ORAL
Refills: 0 | Status: COMPLETED | OUTPATIENT
Start: 2019-09-26 | End: 2019-09-26

## 2019-09-26 RX ORDER — BACITRACIN ZINC 500 UNIT/G
1 OINTMENT IN PACKET (EA) TOPICAL DAILY
Refills: 0 | Status: DISCONTINUED | OUTPATIENT
Start: 2019-09-26 | End: 2019-09-28

## 2019-09-26 RX ADMIN — Medication 100 MILLIEQUIVALENT(S): at 11:27

## 2019-09-26 RX ADMIN — HEPARIN SODIUM 5000 UNIT(S): 5000 INJECTION INTRAVENOUS; SUBCUTANEOUS at 05:07

## 2019-09-26 RX ADMIN — Medication 750 MILLIGRAM(S): at 05:38

## 2019-09-26 RX ADMIN — Medication 100 MILLIGRAM(S): at 14:18

## 2019-09-26 RX ADMIN — HEPARIN SODIUM 5000 UNIT(S): 5000 INJECTION INTRAVENOUS; SUBCUTANEOUS at 14:18

## 2019-09-26 RX ADMIN — Medication 100 MILLIEQUIVALENT(S): at 10:09

## 2019-09-26 RX ADMIN — Medication 750 MILLIGRAM(S): at 00:11

## 2019-09-26 RX ADMIN — BENZOCAINE AND MENTHOL 1 LOZENGE: 5; 1 LIQUID ORAL at 20:05

## 2019-09-26 RX ADMIN — Medication 100 MILLIGRAM(S): at 05:07

## 2019-09-26 RX ADMIN — Medication 750 MILLIGRAM(S): at 13:59

## 2019-09-26 RX ADMIN — HEPARIN SODIUM 5000 UNIT(S): 5000 INJECTION INTRAVENOUS; SUBCUTANEOUS at 22:07

## 2019-09-26 RX ADMIN — Medication 100 MILLIGRAM(S): at 22:07

## 2019-09-26 RX ADMIN — DEXTROSE MONOHYDRATE, SODIUM CHLORIDE, AND POTASSIUM CHLORIDE 50 MILLILITER(S): 50; .745; 4.5 INJECTION, SOLUTION INTRAVENOUS at 09:20

## 2019-09-26 RX ADMIN — Medication 200 MILLIGRAM(S): at 23:59

## 2019-09-26 RX ADMIN — Medication 200 MILLIGRAM(S): at 13:00

## 2019-09-26 RX ADMIN — Medication 400 MILLIGRAM(S): at 05:08

## 2019-09-26 RX ADMIN — Medication 1 APPLICATION(S): at 20:05

## 2019-09-26 RX ADMIN — Medication 400 MILLIGRAM(S): at 12:59

## 2019-09-26 RX ADMIN — LOSARTAN POTASSIUM 50 MILLIGRAM(S): 100 TABLET, FILM COATED ORAL at 05:08

## 2019-09-26 RX ADMIN — Medication 100 MILLIEQUIVALENT(S): at 09:21

## 2019-09-26 NOTE — PROGRESS NOTE ADULT - ASSESSMENT
86F with rectal prolapse POD#1 s/p perineal proctectomy and levatorplasty, recovering well    Pt seen and examined with Dr. Vázquez  - CLD  - OLUZ MARIA & amb with assistance  - pain control prn  - d/c Pardo  - c/w CIPRO/Flagyl

## 2019-09-26 NOTE — PHYSICAL THERAPY INITIAL EVALUATION ADULT - PERTINENT HX OF CURRENT PROBLEM, REHAB EVAL
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  She presents for a perineal approach prolapse resection w/ Dr. Vázquez after having had prolapse w/ bleeding for the past couple of weeks.

## 2019-09-27 LAB
ANION GAP SERPL CALC-SCNC: 10 MMO/L — SIGNIFICANT CHANGE UP (ref 7–14)
ANION GAP SERPL CALC-SCNC: 8 MMO/L — SIGNIFICANT CHANGE UP (ref 7–14)
BUN SERPL-MCNC: 5 MG/DL — LOW (ref 7–23)
BUN SERPL-MCNC: 9 MG/DL — SIGNIFICANT CHANGE UP (ref 7–23)
CALCIUM SERPL-MCNC: 8.2 MG/DL — LOW (ref 8.4–10.5)
CALCIUM SERPL-MCNC: 8.5 MG/DL — SIGNIFICANT CHANGE UP (ref 8.4–10.5)
CHLORIDE SERPL-SCNC: 107 MMOL/L — SIGNIFICANT CHANGE UP (ref 98–107)
CHLORIDE SERPL-SCNC: 108 MMOL/L — HIGH (ref 98–107)
CO2 SERPL-SCNC: 23 MMOL/L — SIGNIFICANT CHANGE UP (ref 22–31)
CO2 SERPL-SCNC: 23 MMOL/L — SIGNIFICANT CHANGE UP (ref 22–31)
CREAT SERPL-MCNC: 0.66 MG/DL — SIGNIFICANT CHANGE UP (ref 0.5–1.3)
CREAT SERPL-MCNC: 0.77 MG/DL — SIGNIFICANT CHANGE UP (ref 0.5–1.3)
GLUCOSE SERPL-MCNC: 89 MG/DL — SIGNIFICANT CHANGE UP (ref 70–99)
GLUCOSE SERPL-MCNC: 91 MG/DL — SIGNIFICANT CHANGE UP (ref 70–99)
HCT VFR BLD CALC: 36.6 % — SIGNIFICANT CHANGE UP (ref 34.5–45)
HGB BLD-MCNC: 11.6 G/DL — SIGNIFICANT CHANGE UP (ref 11.5–15.5)
MAGNESIUM SERPL-MCNC: 2 MG/DL — SIGNIFICANT CHANGE UP (ref 1.6–2.6)
MAGNESIUM SERPL-MCNC: 2 MG/DL — SIGNIFICANT CHANGE UP (ref 1.6–2.6)
MCHC RBC-ENTMCNC: 31 PG — SIGNIFICANT CHANGE UP (ref 27–34)
MCHC RBC-ENTMCNC: 31.7 % — LOW (ref 32–36)
MCV RBC AUTO: 97.9 FL — SIGNIFICANT CHANGE UP (ref 80–100)
NRBC # FLD: 0 K/UL — SIGNIFICANT CHANGE UP (ref 0–0)
PHOSPHATE SERPL-MCNC: 1.7 MG/DL — LOW (ref 2.5–4.5)
PHOSPHATE SERPL-MCNC: 2.8 MG/DL — SIGNIFICANT CHANGE UP (ref 2.5–4.5)
PLATELET # BLD AUTO: 270 K/UL — SIGNIFICANT CHANGE UP (ref 150–400)
PMV BLD: 9.9 FL — SIGNIFICANT CHANGE UP (ref 7–13)
POTASSIUM SERPL-MCNC: 3.4 MMOL/L — LOW (ref 3.5–5.3)
POTASSIUM SERPL-MCNC: 3.7 MMOL/L — SIGNIFICANT CHANGE UP (ref 3.5–5.3)
POTASSIUM SERPL-SCNC: 3.4 MMOL/L — LOW (ref 3.5–5.3)
POTASSIUM SERPL-SCNC: 3.7 MMOL/L — SIGNIFICANT CHANGE UP (ref 3.5–5.3)
RBC # BLD: 3.74 M/UL — LOW (ref 3.8–5.2)
RBC # FLD: 13.5 % — SIGNIFICANT CHANGE UP (ref 10.3–14.5)
SODIUM SERPL-SCNC: 138 MMOL/L — SIGNIFICANT CHANGE UP (ref 135–145)
SODIUM SERPL-SCNC: 141 MMOL/L — SIGNIFICANT CHANGE UP (ref 135–145)
WBC # BLD: 6.98 K/UL — SIGNIFICANT CHANGE UP (ref 3.8–10.5)
WBC # FLD AUTO: 6.98 K/UL — SIGNIFICANT CHANGE UP (ref 3.8–10.5)

## 2019-09-27 RX ORDER — SODIUM,POTASSIUM PHOSPHATES 278-250MG
1 POWDER IN PACKET (EA) ORAL EVERY 4 HOURS
Refills: 0 | Status: COMPLETED | OUTPATIENT
Start: 2019-09-27 | End: 2019-09-27

## 2019-09-27 RX ORDER — ACETAMINOPHEN 500 MG
650 TABLET ORAL EVERY 6 HOURS
Refills: 0 | Status: DISCONTINUED | OUTPATIENT
Start: 2019-09-27 | End: 2019-09-28

## 2019-09-27 RX ADMIN — Medication 1 APPLICATION(S): at 05:15

## 2019-09-27 RX ADMIN — Medication 100 MILLIGRAM(S): at 05:19

## 2019-09-27 RX ADMIN — Medication 200 MILLIGRAM(S): at 11:25

## 2019-09-27 RX ADMIN — DEXTROSE MONOHYDRATE, SODIUM CHLORIDE, AND POTASSIUM CHLORIDE 50 MILLILITER(S): 50; .745; 4.5 INJECTION, SOLUTION INTRAVENOUS at 13:39

## 2019-09-27 RX ADMIN — Medication 1 PACKET(S): at 10:16

## 2019-09-27 RX ADMIN — HEPARIN SODIUM 5000 UNIT(S): 5000 INJECTION INTRAVENOUS; SUBCUTANEOUS at 22:13

## 2019-09-27 RX ADMIN — Medication 100 MILLIGRAM(S): at 22:13

## 2019-09-27 RX ADMIN — Medication 1 PACKET(S): at 15:15

## 2019-09-27 RX ADMIN — Medication 100 MILLIGRAM(S): at 13:39

## 2019-09-27 RX ADMIN — HEPARIN SODIUM 5000 UNIT(S): 5000 INJECTION INTRAVENOUS; SUBCUTANEOUS at 05:17

## 2019-09-27 RX ADMIN — HEPARIN SODIUM 5000 UNIT(S): 5000 INJECTION INTRAVENOUS; SUBCUTANEOUS at 13:03

## 2019-09-27 RX ADMIN — LOSARTAN POTASSIUM 50 MILLIGRAM(S): 100 TABLET, FILM COATED ORAL at 05:15

## 2019-09-27 NOTE — PROVIDER CONTACT NOTE (OTHER) - SITUATION
Patient refusing Ciprofloxacin. Patient states that Antibiotic has caused pruritis in the past.
DISPLAY PLAN FREE TEXT

## 2019-09-27 NOTE — PROVIDER CONTACT NOTE (OTHER) - ASSESSMENT
No signs of distress or pruritis currently, pt. reports that in the past she has experienced pruritis with this medication.

## 2019-09-27 NOTE — PROVIDER CONTACT NOTE (OTHER) - ACTION/TREATMENT ORDERED:
MD to bedside. MD to bedside, Patient agreed to take medication, not a true allergy. Patient tolerated IV run at a slower rate without complaints of adverse effects.

## 2019-09-27 NOTE — PROGRESS NOTE ADULT - ASSESSMENT
86F with rectal prolapse POD#1 s/p perineal proctectomy and levatorplasty, recovering well    Pt seen and examined with Dr. Vázquez  - DHARMESH  - OLUZ MARIA & amb with assistance  - pain control prn  - c/w CIPRO/Flagyl for 5 days 86F with rectal prolapse POD#2 s/p perineal proctectomy and levatorplasty, recovering well    Pt seen and examined with Dr. Vázquez  - DHARMESH  - OLUZ MARIA & amb with assistance  - pain control prn  - c/w CIPRO/Flagyl for 5 days

## 2019-09-27 NOTE — PROGRESS NOTE ADULT - ASSESSMENT
86F with rectal prolapse POD#2 s/p perineal proctectomy and levatorplasty, recovering well    Pt seen and examined with Dr. Vázquez  - Reg diet  - OOB & amb  - pain control prn  - c/w CIPRO/Flagyl IV

## 2019-09-28 ENCOUNTER — TRANSCRIPTION ENCOUNTER (OUTPATIENT)
Age: 84
End: 2019-09-28

## 2019-09-28 VITALS
SYSTOLIC BLOOD PRESSURE: 146 MMHG | HEART RATE: 74 BPM | RESPIRATION RATE: 18 BRPM | TEMPERATURE: 98 F | DIASTOLIC BLOOD PRESSURE: 83 MMHG | OXYGEN SATURATION: 99 %

## 2019-09-28 LAB
ANION GAP SERPL CALC-SCNC: 10 MMO/L — SIGNIFICANT CHANGE UP (ref 7–14)
BUN SERPL-MCNC: 9 MG/DL — SIGNIFICANT CHANGE UP (ref 7–23)
CALCIUM SERPL-MCNC: 7.9 MG/DL — LOW (ref 8.4–10.5)
CHLORIDE SERPL-SCNC: 108 MMOL/L — HIGH (ref 98–107)
CO2 SERPL-SCNC: 21 MMOL/L — LOW (ref 22–31)
CREAT SERPL-MCNC: 0.6 MG/DL — SIGNIFICANT CHANGE UP (ref 0.5–1.3)
GLUCOSE SERPL-MCNC: 92 MG/DL — SIGNIFICANT CHANGE UP (ref 70–99)
HCT VFR BLD CALC: 33.9 % — LOW (ref 34.5–45)
HGB BLD-MCNC: 11.5 G/DL — SIGNIFICANT CHANGE UP (ref 11.5–15.5)
MAGNESIUM SERPL-MCNC: 2.1 MG/DL — SIGNIFICANT CHANGE UP (ref 1.6–2.6)
MCHC RBC-ENTMCNC: 32.1 PG — SIGNIFICANT CHANGE UP (ref 27–34)
MCHC RBC-ENTMCNC: 33.9 % — SIGNIFICANT CHANGE UP (ref 32–36)
MCV RBC AUTO: 94.7 FL — SIGNIFICANT CHANGE UP (ref 80–100)
NRBC # FLD: 0 K/UL — SIGNIFICANT CHANGE UP (ref 0–0)
PHOSPHATE SERPL-MCNC: 1.9 MG/DL — LOW (ref 2.5–4.5)
PLATELET # BLD AUTO: 255 K/UL — SIGNIFICANT CHANGE UP (ref 150–400)
PMV BLD: 9.7 FL — SIGNIFICANT CHANGE UP (ref 7–13)
POTASSIUM SERPL-MCNC: 3.9 MMOL/L — SIGNIFICANT CHANGE UP (ref 3.5–5.3)
POTASSIUM SERPL-SCNC: 3.9 MMOL/L — SIGNIFICANT CHANGE UP (ref 3.5–5.3)
RBC # BLD: 3.58 M/UL — LOW (ref 3.8–5.2)
RBC # FLD: 13.7 % — SIGNIFICANT CHANGE UP (ref 10.3–14.5)
SODIUM SERPL-SCNC: 139 MMOL/L — SIGNIFICANT CHANGE UP (ref 135–145)
WBC # BLD: 5.81 K/UL — SIGNIFICANT CHANGE UP (ref 3.8–10.5)
WBC # FLD AUTO: 5.81 K/UL — SIGNIFICANT CHANGE UP (ref 3.8–10.5)

## 2019-09-28 RX ORDER — DOCUSATE SODIUM 100 MG
1 CAPSULE ORAL
Qty: 90 | Refills: 0
Start: 2019-09-28 | End: 2019-10-27

## 2019-09-28 RX ORDER — SODIUM,POTASSIUM PHOSPHATES 278-250MG
1 POWDER IN PACKET (EA) ORAL ONCE
Refills: 0 | Status: COMPLETED | OUTPATIENT
Start: 2019-09-28 | End: 2019-09-28

## 2019-09-28 RX ORDER — MULTIVIT-MIN/FERROUS GLUCONATE 9 MG/15 ML
1 LIQUID (ML) ORAL
Qty: 0 | Refills: 0 | DISCHARGE

## 2019-09-28 RX ORDER — POTASSIUM PHOSPHATE, MONOBASIC POTASSIUM PHOSPHATE, DIBASIC 236; 224 MG/ML; MG/ML
15 INJECTION, SOLUTION INTRAVENOUS ONCE
Refills: 0 | Status: DISCONTINUED | OUTPATIENT
Start: 2019-09-28 | End: 2019-09-28

## 2019-09-28 RX ORDER — BACITRACIN ZINC 500 UNIT/G
1 OINTMENT IN PACKET (EA) TOPICAL
Qty: 0 | Refills: 0 | DISCHARGE
Start: 2019-09-28

## 2019-09-28 RX ORDER — ACETAMINOPHEN 500 MG
2 TABLET ORAL
Qty: 0 | Refills: 0 | DISCHARGE
Start: 2019-09-28

## 2019-09-28 RX ADMIN — INFLUENZA VIRUS VACCINE 0.5 MILLILITER(S): 15; 15; 15; 15 SUSPENSION INTRAMUSCULAR at 19:10

## 2019-09-28 RX ADMIN — HEPARIN SODIUM 5000 UNIT(S): 5000 INJECTION INTRAVENOUS; SUBCUTANEOUS at 13:21

## 2019-09-28 RX ADMIN — Medication 1 PACKET(S): at 14:51

## 2019-09-28 RX ADMIN — Medication 100 MILLIGRAM(S): at 13:21

## 2019-09-28 RX ADMIN — LOSARTAN POTASSIUM 50 MILLIGRAM(S): 100 TABLET, FILM COATED ORAL at 06:02

## 2019-09-28 RX ADMIN — HEPARIN SODIUM 5000 UNIT(S): 5000 INJECTION INTRAVENOUS; SUBCUTANEOUS at 06:02

## 2019-09-28 RX ADMIN — Medication 100 MILLIGRAM(S): at 06:03

## 2019-09-28 RX ADMIN — Medication 200 MILLIGRAM(S): at 01:00

## 2019-09-28 RX ADMIN — Medication 200 MILLIGRAM(S): at 13:20

## 2019-09-28 NOTE — PROGRESS NOTE ADULT - SUBJECTIVE AND OBJECTIVE BOX
ANESTHESIA POSTOP CHECK    86y Female POSTOP DAY 1 S/P     Vital Signs Last 24 Hrs  T(C): 36.6 (26 Sep 2019 05:05), Max: 37.3 (25 Sep 2019 18:42)  T(F): 97.8 (26 Sep 2019 05:05), Max: 99.2 (25 Sep 2019 18:42)  HR: 74 (26 Sep 2019 05:05) (57 - 98)  BP: 121/61 (26 Sep 2019 05:05) (105/56 - 170/93)  BP(mean): 83 (25 Sep 2019 17:15) (83 - 91)  RR: 17 (26 Sep 2019 05:05) (12 - 18)  SpO2: 98% (26 Sep 2019 05:05) (92% - 100%)  I&O's Summary    25 Sep 2019 07:01  -  26 Sep 2019 07:00  --------------------------------------------------------  IN: 1400 mL / OUT: 975 mL / NET: 425 mL        [ x] NO APPARENT ANESTHESIA COMPLICATIONS      Comments:
GENERAL SURGERY DAILY PROGRESS NOTE:    Interval:  No acute events overnight endorsed.    Subjective:  Patient seen and examined this am. Denies pain. +Diarrhea. Counselled bowel prep infxn surgery early pm NPO    Vital Signs Last 24 Hrs  T(C): 36.6 (25 Sep 2019 10:50), Max: 36.8 (25 Sep 2019 06:07)  T(F): 97.9 (25 Sep 2019 10:50), Max: 98.2 (25 Sep 2019 06:07)  HR: 64 (25 Sep 2019 10:30) (57 - 70)  BP: 141/89 (25 Sep 2019 10:50) (118/65 - 156/77)  BP(mean): --  RR: 14 (25 Sep 2019 10:50) (14 - 18)  SpO2: 97% (25 Sep 2019 10:50) (97% - 99%)    Exam:  Gen: NAD, resting in bed, alert and responding appropriately  Resp: Airway patent, non-labored respirations  Abd: Soft, ND, NTTP x 4 quadrants, no rebound or guarding. Incisions c/d/i  Ext: No edema, WWP  Neuro: AAOx3, no focal deficits    I&O's Detail    24 Sep 2019 07:01  -  25 Sep 2019 07:00  --------------------------------------------------------  IN:    lactated ringers.: 1150 mL    Oral Fluid: 240 mL  Total IN: 1390 mL    OUT:    Voided: 650 mL  Total OUT: 650 mL    Total NET: 740 mL      25 Sep 2019 07:01  -  25 Sep 2019 15:50  --------------------------------------------------------  IN:    IV PiggyBack: 250 mL    lactated ringers.: 200 mL  Total IN: 450 mL    OUT:  Total OUT: 0 mL    Total NET: 450 mL          Daily Height in cm: 152.4 (25 Sep 2019 03:37)    Daily     MEDICATIONS  (STANDING):  acetaminophen  IVPB .. 750 milliGRAM(s) IV Intermittent every 6 hours  ciprofloxacin   IVPB 400 milliGRAM(s) IV Intermittent every 12 hours  heparin  Injectable 5000 Unit(s) SubCutaneous every 8 hours  influenza   Vaccine 0.5 milliLiter(s) IntraMuscular once  lactated ringers. 1000 milliLiter(s) (50 mL/Hr) IV Continuous <Continuous>  losartan 50 milliGRAM(s) Oral daily  metroNIDAZOLE  IVPB 500 milliGRAM(s) IV Intermittent every 8 hours    MEDICATIONS  (PRN):  fentaNYL    Injectable 25 MICROGram(s) IV Push every 5 minutes PRN Moderate Pain (4 - 6)  morphine  - Injectable 2 milliGRAM(s) IV Push every 4 hours PRN Severe Pain (7 - 10)  ondansetron Injectable 4 milliGRAM(s) IV Push once PRN Nausea and/or Vomiting      LABS:                        12.5   5.04  )-----------( 318      ( 25 Sep 2019 06:40 )             37.3     09-    142  |  103  |  11  ----------------------------<  85  3.8   |  24  |  0.69    Ca    8.7      25 Sep 2019 06:40  Phos  2.6     -  Mg     2.2     09-25      PT/INR - ( 25 Sep 2019 06:40 )   PT: 12.2 SEC;   INR: 1.07          PTT - ( 25 Sep 2019 06:40 )  PTT:31.2 SEC  Urinalysis Basic - ( 24 Sep 2019 17:00 )    Color: COLORLESS / Appearance: CLEAR / S.005 / pH: 8.0  Gluc: NEGATIVE / Ketone: NEGATIVE  / Bili: NEGATIVE / Urobili: NORMAL   Blood: NEGATIVE / Protein: NEGATIVE / Nitrite: NEGATIVE   Leuk Esterase: NEGATIVE / RBC: x / WBC x   Sq Epi: x / Non Sq Epi: x / Bacteria: x        ANDRES Yuan, PGY-1  A Team Surgery  [id number] with any questions
GENERAL SURGERY DAILY PROGRESS NOTE:    Interval:  No acute events overnight endorsed. Pain controlled. Pt. tolerating regular diet well. She feels ready to go home today.      Objective:  Vital Signs Last 24 Hrs  T(C): 36.6 (28 Sep 2019 06:02), Max: 37.2 (27 Sep 2019 14:28)  T(F): 97.8 (28 Sep 2019 06:02), Max: 99 (27 Sep 2019 14:28)  HR: 69 (28 Sep 2019 06:02) (69 - 88)  BP: 131/80 (28 Sep 2019 06:02) (130/79 - 140/80)  BP(mean): --  RR: 17 (28 Sep 2019 06:02) (17 - 18)  SpO2: 100% (28 Sep 2019 06:02) (97% - 100%)    I&O's Detail    27 Sep 2019 07:01  -  28 Sep 2019 07:00  --------------------------------------------------------  IN:    dextrose 5% + sodium chloride 0.45% with potassium chloride 20 mEq/L: 400 mL    IV PiggyBack: 600 mL    Oral Fluid: 600 mL  Total IN: 1600 mL    OUT:    Voided: 450 mL  Total OUT: 450 mL    Total NET: 1150 mL      MEDICATIONS  (STANDING):  ciprofloxacin   IVPB 400 milliGRAM(s) IV Intermittent every 12 hours  heparin  Injectable 5000 Unit(s) SubCutaneous every 8 hours  influenza   Vaccine 0.5 milliLiter(s) IntraMuscular once  losartan 50 milliGRAM(s) Oral daily  metroNIDAZOLE  IVPB 500 milliGRAM(s) IV Intermittent every 8 hours    MEDICATIONS  (PRN):  acetaminophen   Tablet .. 650 milliGRAM(s) Oral every 6 hours PRN Mild Pain (1 - 3)  BACItracin   Ointment 1 Application(s) Topical daily PRN as dressing needs to be reapplied                            11.5   5.81  )-----------( 255      ( 28 Sep 2019 06:40 )             33.9       09-28    139  |  108<H>  |  9   ----------------------------<  92  3.9   |  21<L>  |  0.60    Ca    7.9<L>      28 Sep 2019 06:40  Phos  1.9     09-28  Mg     2.1     09-28      Exam:  Gen: NAD, sitting in bed, alert and responding appropriately  Resp: Airway patent, non-labored respirations  Abd: Soft, ND, NTTP x 4 quadrants, no rebound or guarding.   Rectum: Dressing cdi  Ext: No edema, WWP  Neuro: AAOx3, no focal deficits
INTERVAL HPI/OVERNIGHT EVENTS: Pt seen and examined. Doing well. Pain managed. No N/V    STATUS POST:  perineal proctectomy, levatorplasty    POST OPERATIVE DAY #: 1    MEDICATIONS  (STANDING):  acetaminophen  IVPB .. 750 milliGRAM(s) IV Intermittent every 6 hours  ciprofloxacin   IVPB 400 milliGRAM(s) IV Intermittent every 12 hours  dextrose 5% + sodium chloride 0.45% with potassium chloride 20 mEq/L 1000 milliLiter(s) (50 mL/Hr) IV Continuous <Continuous>  heparin  Injectable 5000 Unit(s) SubCutaneous every 8 hours  influenza   Vaccine 0.5 milliLiter(s) IntraMuscular once  losartan 50 milliGRAM(s) Oral daily  metroNIDAZOLE  IVPB 500 milliGRAM(s) IV Intermittent every 8 hours  potassium chloride  10 mEq/100 mL IVPB 10 milliEquivalent(s) IV Intermittent every 1 hour    MEDICATIONS  (PRN):  morphine  - Injectable 2 milliGRAM(s) IV Push every 4 hours PRN Severe Pain (7 - 10)      Vital Signs Last 24 Hrs  T(C): 37.2 (26 Sep 2019 10:00), Max: 37.3 (25 Sep 2019 18:42)  T(F): 99 (26 Sep 2019 10:00), Max: 99.2 (25 Sep 2019 18:42)  HR: 64 (26 Sep 2019 10:00) (57 - 98)  BP: 106/65 (26 Sep 2019 10:00) (105/56 - 170/93)  BP(mean): 83 (25 Sep 2019 17:15) (83 - 91)  RR: 16 (26 Sep 2019 10:00) (12 - 18)  SpO2: 98% (26 Sep 2019 10:00) (92% - 100%)    PHYSICAL EXAM:      Constitutional: NAD    Rectal: healing well, no bleeding or hematomas, some swelling          I&O's Detail    25 Sep 2019 07:01  -  26 Sep 2019 07:00  --------------------------------------------------------  IN:    IV PiggyBack: 625 mL    lactated ringers.: 775 mL  Total IN: 1400 mL    OUT:    Estimated Blood Loss: 20 mL    Indwelling Catheter - Urethral: 955 mL  Total OUT: 975 mL    Total NET: 425 mL      26 Sep 2019 07:01  -  26 Sep 2019 10:05  --------------------------------------------------------  IN:  Total IN: 0 mL    OUT:    Indwelling Catheter - Urethral: 200 mL  Total OUT: 200 mL    Total NET: -200 mL          LABS:                        11.4   7.49  )-----------( 281      ( 26 Sep 2019 07:15 )             33.8         140  |  106  |  9   ----------------------------<  93  3.3<L>   |  25  |  0.76    Ca    8.0<L>      26 Sep 2019 07:15  Phos  2.7       Mg     2.0           PT/INR - ( 25 Sep 2019 06:40 )   PT: 12.2 SEC;   INR: 1.07          PTT - ( 25 Sep 2019 06:40 )  PTT:31.2 SEC  Urinalysis Basic - ( 24 Sep 2019 17:00 )    Color: COLORLESS / Appearance: CLEAR / S.005 / pH: 8.0  Gluc: NEGATIVE / Ketone: NEGATIVE  / Bili: NEGATIVE / Urobili: NORMAL   Blood: NEGATIVE / Protein: NEGATIVE / Nitrite: NEGATIVE   Leuk Esterase: NEGATIVE / RBC: x / WBC x   Sq Epi: x / Non Sq Epi: x / Bacteria: x        RADIOLOGY & ADDITIONAL STUDIES:
INTERVAL HPI/OVERNIGHT EVENTS: Pt seen and examined. Doing well. Tolerating diet. No GIF    STATUS POST:  perineal proctectomy, levatorplasty     POST OPERATIVE DAY #: 2    MEDICATIONS  (STANDING):  ciprofloxacin   IVPB 400 milliGRAM(s) IV Intermittent every 12 hours  dextrose 5% + sodium chloride 0.45% with potassium chloride 20 mEq/L 1000 milliLiter(s) (50 mL/Hr) IV Continuous <Continuous>  heparin  Injectable 5000 Unit(s) SubCutaneous every 8 hours  influenza   Vaccine 0.5 milliLiter(s) IntraMuscular once  losartan 50 milliGRAM(s) Oral daily  metroNIDAZOLE  IVPB 500 milliGRAM(s) IV Intermittent every 8 hours  potassium phosphate / sodium phosphate powder 1 Packet(s) Oral every 4 hours    MEDICATIONS  (PRN):  BACItracin   Ointment 1 Application(s) Topical daily PRN as dressing needs to be reapplied  morphine  - Injectable 2 milliGRAM(s) IV Push every 4 hours PRN Severe Pain (7 - 10)      Vital Signs Last 24 Hrs  T(C): 36.9 (27 Sep 2019 05:15), Max: 37.2 (26 Sep 2019 10:00)  T(F): 98.4 (27 Sep 2019 05:15), Max: 99 (26 Sep 2019 10:00)  HR: 77 (27 Sep 2019 05:15) (62 - 77)  BP: 126/74 (27 Sep 2019 05:15) (106/65 - 148/88)  BP(mean): --  RR: 16 (27 Sep 2019 05:15) (15 - 18)  SpO2: 99% (27 Sep 2019 05:15) (98% - 99%)    PHYSICAL EXAM:      Constitutional: NAD    Rectal: healing well, no bleeding or hematomas.        I&O's Detail    26 Sep 2019 07:01  -  27 Sep 2019 07:00  --------------------------------------------------------  IN:    dextrose 5% + sodium chloride 0.45% with potassium chloride 20 mEq/L: 1000 mL    IV PiggyBack: 1100 mL    lactated ringers.: 100 mL    Oral Fluid: 800 mL  Total IN: 3000 mL    OUT:    Indwelling Catheter - Urethral: 200 mL    Voided: 325 mL  Total OUT: 525 mL    Total NET: 2475 mL          LABS:                        11.6   6.98  )-----------( 270      ( 27 Sep 2019 06:30 )             36.6     09-27    138  |  107  |  5<L>  ----------------------------<  91  3.4<L>   |  23  |  0.66    Ca    8.2<L>      27 Sep 2019 06:30  Phos  1.7     09-27  Mg     2.0     09-27            RADIOLOGY & ADDITIONAL STUDIES:
PRE OPERATIVE NOTE    Pre-op Diagnosis: Rectal prolapse  Procedure: Perineal rectosigmoidectomy   Surgeon: Kayla Vázquez                          13.3   5.66  )-----------( 337      ( 24 Sep 2019 12:15 )             39.7     09-24    140  |  102  |  14  ----------------------------<  88  3.8   |  26  |  0.66    Ca    9.2      24 Sep 2019 23:30  Phos  2.8     09-24  Mg     2.3     09-24        PT/INR - ( 24 Sep 2019 12:15 )   PT: 11.3 SEC;   INR: 0.99          PTT - ( 24 Sep 2019 12:15 )  PTT:28.2 SEC    CAPILLARY BLOOD GLUCOSE          Type & Screen:  Type + Screen (09.24.19 @ 17:24)    ABO Interpretation: A    Rh Interpretation: Positive    Antibody Screen: Negative    CXR:  < from: Xray Chest 1 View- PORTABLE-Urgent (09.24.19 @ 13:01) >      IMPRESSION:  Approximate 6.4 cm smooth bordered left retrocardiac   opacity, of indeterminate nature. Question related to a tortuous or   aneurysmally dilated descending thoracic aorta, hiatus hernia, or other   finding. Suggest PA and lateral chest x-ray for further evaluation.    Discussed with ALMA ROSA Vasquez with read backat 1:56 PM on September 24, 2019   by Dr. Tellez.    < end of copied text >      EKG:  < from: 12 Lead ECG (12.05.12 @ 13:20) >    Ventricular Rate 59 BPM    Atrial Rate 59 BPM    P-R Interval 162 ms    QRS Duration 122 ms     ms    QTc 496 ms    P Axis 62 degrees    R Axis -66 degrees    T Axis -11 degrees    Diagnosis Line Sinus bradycardia  Right bundle branch block  Left anterior fascicular block  *** Bifascicular block ***  Abnormal ECG    < end of copied text >      Echocardiogram:   Echo performed unchanged from 9/6/18, EF 62%.
GENERAL SURGERY DAILY PROGRESS NOTE:    Interval:  No acute events overnight endorsed.    Subjective:  Patient seen and examined this am. Denies pain. No other complaints. Denies N/V/D. +Tolerating diet. +OOB.    Vital Signs Last 24 Hrs  T(C): 36.9 (27 Sep 2019 05:15), Max: 37.2 (26 Sep 2019 10:00)  T(F): 98.4 (27 Sep 2019 05:15), Max: 99 (26 Sep 2019 10:00)  HR: 77 (27 Sep 2019 05:15) (62 - 77)  BP: 126/74 (27 Sep 2019 05:15) (106/65 - 148/88)  BP(mean): --  RR: 16 (27 Sep 2019 05:15) (15 - 18)  SpO2: 99% (27 Sep 2019 05:15) (98% - 99%)    Exam:  Gen: NAD, resting in bed, alert and responding appropriately  Resp: Airway patent, non-labored respirations  Abd: Soft, ND, NTTP x 4 quadrants, no rebound or guarding.   Rectum: Dressing cdi  Ext: No edema, WWP  Neuro: AAOx3, no focal deficits    I&O's Detail    26 Sep 2019 07:01  -  27 Sep 2019 07:00  --------------------------------------------------------  IN:    dextrose 5% + sodium chloride 0.45% with potassium chloride 20 mEq/L: 1000 mL    IV PiggyBack: 1100 mL    lactated ringers.: 100 mL    Oral Fluid: 800 mL  Total IN: 3000 mL    OUT:    Indwelling Catheter - Urethral: 200 mL    Voided: 325 mL  Total OUT: 525 mL    Total NET: 2475 mL          Daily     Daily     MEDICATIONS  (STANDING):  ciprofloxacin   IVPB 400 milliGRAM(s) IV Intermittent every 12 hours  dextrose 5% + sodium chloride 0.45% with potassium chloride 20 mEq/L 1000 milliLiter(s) (50 mL/Hr) IV Continuous <Continuous>  heparin  Injectable 5000 Unit(s) SubCutaneous every 8 hours  influenza   Vaccine 0.5 milliLiter(s) IntraMuscular once  losartan 50 milliGRAM(s) Oral daily  metroNIDAZOLE  IVPB 500 milliGRAM(s) IV Intermittent every 8 hours    MEDICATIONS  (PRN):  BACItracin   Ointment 1 Application(s) Topical daily PRN as dressing needs to be reapplied  morphine  - Injectable 2 milliGRAM(s) IV Push every 4 hours PRN Severe Pain (7 - 10)      LABS:                        11.4   7.49  )-----------( 281      ( 26 Sep 2019 07:15 )             33.8     09-26    140  |  106  |  9   ----------------------------<  93  3.3<L>   |  25  |  0.76    Ca    8.0<L>      26 Sep 2019 07:15  Phos  2.7     09-26  Mg     2.0     09-26            ANDRES Yuan, PGY-1  A Team Surgery  [id number] with any questions
[id number]

## 2019-09-28 NOTE — DISCHARGE NOTE NURSING/CASE MANAGEMENT/SOCIAL WORK - PATIENT PORTAL LINK FT
You can access the FollowMyHealth Patient Portal offered by [facility] by registering at the following website: [email or web address]. By joining [facility]’s FollowMyHealth portal, you will also be able to view your health information using other applications (apps) compatible with our system.

## 2019-09-28 NOTE — PROGRESS NOTE ADULT - ASSESSMENT
86F with rectal prolapse s/p perineal proctectomy and levatorplasty, recovering well    - LRD  - OOB & amb with assistance  - pain control prn  - D/c home today w/o abx, last dose in hospital today    A Team Surgery  [id number] with any questions 86F with rectal prolapse s/p perineal proctectomy and levatorplasty, recovering well    - LRD  - OOB & amb with assistance  - pain control prn  - D/c home today w/ colace TID & w/o abx, last dose in hospital today    A Team Surgery  [id number] with any questions

## 2019-09-28 NOTE — DISCHARGE NOTE PROVIDER - NSDCFUADDAPPT_GEN_ALL_CORE_FT
WOUND CARE: You may keep lining or sterile absorbent pads in your undergarment as needed for wound protection and maintenance, changing at least once daily.  BATHING: Please do not submerge wound underwater. You may shower and/or sponge bathe. Do no scrub the wound directly.  ACTIVITY: No heavy lifting or straining. Otherwise, you may return to your usual level of physical activity. If you are taking narcotic pain medication (such as Percocet), do NOT drive a car, operate machinery or make important decisions.  DIET: Return to your usual diet.  NOTIFY YOUR SURGEON IF: You have any bleeding that does not stop, any pus draining from your wound, any fever (over 100.4 F) or chills, persistent nausea/vomiting, persistent diarrhea, or if your pain is not controlled on your discharge pain medications.  FOLLOW-UP:  1. Follow-up with Dr. Vázquez within 1-2 weeks of discharge.  Please call office for appointment  2. Please follow up with your primary care physician in one week regarding your hospitalization.

## 2019-09-28 NOTE — DISCHARGE NOTE PROVIDER - HOSPITAL COURSE
Patient is an 85yo Female S/P rectosigmoidectomy for hx of recurrent rectal prolapse. The first episode of rectal prolapse occurred 10-15 years ago.        She presents here today as a direct admission for a perineal approach prolapse resection w/ Dr. Vázquez after having had prolapse w/ bleeding for the past couple of weeks. At the time of admission, she denied headache, lightheadedness, palpitations, chest pain, shortness of breath. She also denied nausea, vomiting, diarrhea, or any change in quality or frequency of stools.        Her past medical hx includes hypertension, osteoporosis, hearing loss worse in the right ear, otosclerosis and mitral valve prolapse.        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 "Mobilization of Stapes". Patient is an 87yo Female S/P rectosigmoidectomy for hx of recurrent rectal prolapse. The first episode of rectal prolapse occurred 10-15 years ago.        She presents here today as a direct admission for a perineal approach prolapse resection w/ Dr. Vázquez after having had prolapse w/ bleeding for the past couple of weeks. At the time of admission, she denied headache, lightheadedness, palpitations, chest pain, shortness of breath. She also denied nausea, vomiting, diarrhea, or any change in quality or frequency of stools.        Her past medical hx includes hypertension, osteoporosis, hearing loss worse in the right ear, otosclerosis and mitral valve prolapse.        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 "Mobilization of Stapes".        On 19 she underwent an Altmeier procedure with levatorplasty to treat her recurrent rectal prolapse. The surgery was uncomplicated and the patient was sent to the PACU, then transferred back to the surgical floor post-operatively. On the floor, the patient's diet was gradually advanced to a regular, low fiber diet, which she was fully tolerating at the time of discharge. She was also out of bed often, ambulating freely.        At the time of discharge, the patient was in stable condition and safe to discharge home with plans to follow-up with her primary care provider within one week of discharge. She will also need to follow-up with her surgeon, Dr. Vázquez in 7 to 10 days.

## 2019-09-28 NOTE — PROGRESS NOTE ADULT - ATTENDING COMMENTS
Pt seen/examined, agree with above    - ambulate
Pt seen/examined, agree with above    - sl iv when tolerating PO
Pt seen/examined earlier this morning. Doing great.    - d/c home after lunch on colace tid  - f/u in the office 7-10d

## 2019-09-28 NOTE — DISCHARGE NOTE PROVIDER - CARE PROVIDER_API CALL
Eileen Vázquez (MD)  ColonRectal Surgery; Surgery  [address] [facility] [address]  [place] [address]  Phone: [phone]  Fax: [phone]  Follow Up Time:

## 2019-09-28 NOTE — PROGRESS NOTE ADULT - REASON FOR ADMISSION
Recurrent rectal prolapse

## 2019-10-02 LAB — SURGICAL PATHOLOGY STUDY: SIGNIFICANT CHANGE UP

## 2020-02-28 ENCOUNTER — APPOINTMENT (OUTPATIENT)
Dept: HEPATOLOGY | Facility: CLINIC | Age: 85
End: 2020-02-28
Payer: MEDICARE

## 2020-02-28 VITALS
RESPIRATION RATE: 6 BRPM | TEMPERATURE: 97.9 F | SYSTOLIC BLOOD PRESSURE: 121 MMHG | HEIGHT: 58 IN | DIASTOLIC BLOOD PRESSURE: 61 MMHG | BODY MASS INDEX: 27.92 KG/M2 | HEART RATE: 85 BPM | WEIGHT: 133 LBS

## 2020-02-28 DIAGNOSIS — Z60.2 PROBLEMS RELATED TO LIVING ALONE: ICD-10-CM

## 2020-02-28 DIAGNOSIS — R41.89 OTHER SYMPTOMS AND SIGNS INVOLVING COGNITIVE FUNCTIONS AND AWARENESS: ICD-10-CM

## 2020-02-28 DIAGNOSIS — R74.8 ABNORMAL LEVELS OF OTHER SERUM ENZYMES: ICD-10-CM

## 2020-02-28 PROCEDURE — 99204 OFFICE O/P NEW MOD 45 MIN: CPT

## 2020-02-28 RX ORDER — LOSARTAN POTASSIUM 100 MG/1
100 TABLET, FILM COATED ORAL DAILY
Qty: 90 | Refills: 3 | Status: DISCONTINUED | COMMUNITY
Start: 1900-01-01 | End: 2020-02-28

## 2020-02-28 SDOH — SOCIAL STABILITY - SOCIAL INSECURITY: PROBLEMS RELATED TO LIVING ALONE: Z60.2

## 2020-02-28 NOTE — REVIEW OF SYSTEMS
[Recent Weight Gain (___ Lbs)] : recent [unfilled] ~Ulb weight gain [Feeling Poorly] : feeling poorly [Feeling Tired] : feeling tired [Loss Of Hearing] : hearing loss [Lower Ext Edema] : lower extremity edema [Chest Pain] : no chest pain [Shortness Of Breath] : shortness of breath [Wheezing] : no wheezing [SOB on Exertion] : shortness of breath during exertion [Cough] : no cough [Orthopnea] : no orthopnea [PND] : no PND [As Noted in HPI] : as noted in HPI [Limb Swelling] : limb swelling [Confused] : confusion [Difficulty Walking] : difficulty walking [Negative] : Heme/Lymph

## 2020-02-28 NOTE — HISTORY OF PRESENT ILLNESS
[de-identified] : Ms. So is an 86 yo F with HTN, dyslipidemia, HFpEF with borderline pulmonary hypertension (based on TTE done on 19), RBBB, mild carotid atherosclerosis, osteoporosis, otosclerosis (s/p multiple ENT surgeries) and chronic R>L hearing loss, bilateral cataract surgeries, and prior abdominopelvic surgeries (s/p  x3, RLQ abdominal hernia repair, L inguinal hernia repair, and prolapsed rectum s/p proctoplasty in  and recently s/p excisional surgery on 19), who is being seen seen for evaluation of recent elevated liver enzymes (AST and ALT) as well as worsened lower extremity swelling and cognitive decline.\par \par She and her daughter report that she was in her usual state of health until after her admission to [facility] from 19-29, when she underwent surgical excision of her recurrent rectal prolapse. She received ciprofloxacin and metronidazole as antibiotics while hospitalized. Liver tests were not checked while she was hospitalized, but after discharge her outpatient labs showed elevations in AST and ALT as follows: 123 / 138 (19), 108 / 125 (19), 97 / 106 (19), 121 / 135 (20), and 87 / 101 (20). Prior full liver panels are not yet available for review, but her most recent labs from 20 showed normal alkaline phosphatase, normal bilirubin, and normal liver synthetic function. She has mild peripheral eosinophilia with 5.4% eosinophils (though absolute eosinophil count was within normal limits). She also has mild hypercalcemia to Ca 11.1, and has since stopped taking her calcium supplement.\par \par Her daughter reports that she previously had normal liver tests prior to the hospitalization. Review of her available labs in our EMR include AST and ALT of 24 and 14, respectively, on 18, though her daughter reports she had labs more recently than that prior to the hospitalization as well.\par \par She underwent an abdominal sonogram recently that was reportedly normal, but the report it not yet available for review here.\par \par She complains of worsening bilateral lower extremity swelling since her surgery. She has gained 18 lbs in the past 2 months. Her amlodipine was recently discontinued and her furosemide dose was increased yesterday to 80 mg po daily.\par \par Her daughter also feels that her cognition has declined since the surgery and that she is slower and more forgetful than in the past, sometimes also confused such as forgetting the date.\par \par She has exertional dyspnea but denies chest pain or palpitations. She denies abdominal distension or pain. No overt GI bleeding. No jaundice or scleral icterus.\par \par TTE (19): Normal LV systolic function. No segmental wall motion abnormalities. Mild (stage I) diastolic dysfunction. Normal RV size and function. Estimated RVSP 36 mmHg, assuming RA pressure of 8 mmHg, consistent with borderline pulmonary hypertension. Mild-moderate tricuspid regurgitation. Mitral valve prolapse. Mild-moderate mitral regurgitation. Calcified aortic valve with minimal aortic regurgitation.\par \par Labs (20):\par CBC: WBC 7.3, Hb 13.5, MCV 97, RDW 14.3, Plt 273\par BMP: Na 141, K 4.2, Cl 100, CO2 28, BUN 25, Cr 1.0, Gluc 94\par Liver panel: AP 86, AST 87, , TP 7.3, Alb 4.7, TB 0.75

## 2020-02-28 NOTE — ASSESSMENT
[FreeTextEntry1] : # Acute hepatocellular liver injury, with elevated AST and ALT:\par - Previously with normal liver enzymes, also with normal bilirubin, normal liver synthetic function, normal platelet count, and reportedly normal abdominal sonogram, suggesting against the presence of chronic liver disease.\par - Timing of the liver enzyme elevations suggestive of possible idiosyncratic drug-induced liver injury (DILI) related to the ciprofloxacin or metronidazole that she received while hospitalized in 9/2019, though it would be unusual for relatively mild injury from either medication to persist this long without leading to any cholestasis.\par - Will obtain report of recent abdominal sonogram for review.\par - FibroScan ordered for noninvasive estimation of hepatic steatosis (to evaluate for nonalcoholic fatty liver disease) and hepatic fibrosis (which, if present, would suggest some chronicity).\par - Additional laboratory work-up ordered to rule out viral hepatitis and to evaluate for possible autoimmune hepatitis, which can present at an advanced age in some patients.\par - Hepatic congestion from right heart failure also possible given peripheral edema and prior borderline pulmonary hypertension, though RV function previously reported as normal on TTE in 9/2019. Moreover, congestion more typically leads to cholestasis (elevations in ALP +/- bilirubin) rather than AST and ALT elevations. Persist and mild elevations in AST and ALT also not suggestive of an ischemic liver injury.\par \par # Peripheral edema:\par - Very unlikely to be directly liver-related given no reported sonographic evidence of cirrhosis and preserved liver synthetic function on labs.\par - Cr 1.0, but urine protein/Cr ratio ordered to rule out nephrotic syndrome.\par - Will reach out to her cardiologist, Dr. Paras Ogden, to see whether he thinks she may have experienced any decline in her LV or RV function since her last TTE in 9/2019 that may explain the edema.\par - Likely also has chronic venous stasis and advised compression stockings.\par \par # Mild cognitive impairment:\par - Unlikely to be liver-related as she does not appear to have cirrhosis and certainly does not have labs consistent with liver failure. No asterixis on exam.\par - Has been evaluated by a neurologist in the past.\par \par She will return for follow-up in 3-4 weeks.

## 2020-03-04 LAB
AFP-TM SERPL-MCNC: 7.9 NG/ML
ALBUMIN SERPL ELPH-MCNC: 4.1 G/DL
ALP BLD-CCNC: 80 U/L
ALT SERPL-CCNC: 89 U/L
ANA SER IF-ACNC: NEGATIVE
ANION GAP SERPL CALC-SCNC: 13 MMOL/L
AST SERPL-CCNC: 82 U/L
BASOPHILS # BLD AUTO: 0.07 K/UL
BASOPHILS NFR BLD AUTO: 1 %
BILIRUB SERPL-MCNC: 0.8 MG/DL
BUN SERPL-MCNC: 43 MG/DL
CALCIUM SERPL-MCNC: 10 MG/DL
CHLORIDE SERPL-SCNC: 100 MMOL/L
CO2 SERPL-SCNC: 27 MMOL/L
CREAT SERPL-MCNC: 1.19 MG/DL
CREAT SPEC-SCNC: 57 MG/DL
CREAT/PROT UR: 0.2 RATIO
DEPRECATED KAPPA LC FREE/LAMBDA SER: 1.71 RATIO
EOSINOPHIL # BLD AUTO: 0.35 K/UL
EOSINOPHIL NFR BLD AUTO: 4.8 %
ESTIMATED AVERAGE GLUCOSE: 100 MG/DL
GLUCOSE SERPL-MCNC: 78 MG/DL
HAV IGM SER QL: NONREACTIVE
HBA1C MFR BLD HPLC: 5.1 %
HBV CORE IGG+IGM SER QL: NONREACTIVE
HBV SURFACE AB SERPL IA-ACNC: 16.2 MIU/ML
HBV SURFACE AG SER QL: NONREACTIVE
HCT VFR BLD CALC: 39.3 %
HCV AB SER QL: NONREACTIVE
HCV S/CO RATIO: 0.17 S/CO
HEPATITIS A IGG ANTIBODY: NONREACTIVE
HGB BLD-MCNC: 13.2 G/DL
IGA SER QL IEP: 272 MG/DL
IGG SER QL IEP: 1327 MG/DL
IGM SER QL IEP: 70 MG/DL
IMM GRANULOCYTES NFR BLD AUTO: 0.1 %
INR PPP: 1.08 RATIO
IRON SATN MFR SERPL: 23 %
IRON SERPL-MCNC: 81 UG/DL
KAPPA LC CSF-MCNC: 1.12 MG/DL
KAPPA LC SERPL-MCNC: 1.91 MG/DL
LYMPHOCYTES # BLD AUTO: 1.54 K/UL
LYMPHOCYTES NFR BLD AUTO: 21 %
MAN DIFF?: NORMAL
MCHC RBC-ENTMCNC: 32.3 PG
MCHC RBC-ENTMCNC: 33.6 GM/DL
MCV RBC AUTO: 96.1 FL
MITOCHONDRIA AB SER IF-ACNC: NORMAL
MONOCYTES # BLD AUTO: 0.95 K/UL
MONOCYTES NFR BLD AUTO: 12.9 %
NEUTROPHILS # BLD AUTO: 4.43 K/UL
NEUTROPHILS NFR BLD AUTO: 60.2 %
PLATELET # BLD AUTO: 281 K/UL
POTASSIUM SERPL-SCNC: 4.3 MMOL/L
PROT SERPL-MCNC: 6.9 G/DL
PROT UR-MCNC: 9 MG/DL
PT BLD: 12.2 SEC
RBC # BLD: 4.09 M/UL
RBC # FLD: 14.4 %
SMOOTH MUSCLE AB SER QL IF: NORMAL
SODIUM SERPL-SCNC: 140 MMOL/L
TIBC SERPL-MCNC: 353 UG/DL
UIBC SERPL-MCNC: 272 UG/DL
WBC # FLD AUTO: 7.35 K/UL

## 2020-03-11 ENCOUNTER — APPOINTMENT (OUTPATIENT)
Dept: CARDIOLOGY | Facility: CLINIC | Age: 85
End: 2020-03-11
Payer: MEDICARE

## 2020-03-11 ENCOUNTER — NON-APPOINTMENT (OUTPATIENT)
Age: 85
End: 2020-03-11

## 2020-03-11 VITALS
DIASTOLIC BLOOD PRESSURE: 80 MMHG | BODY MASS INDEX: 26.45 KG/M2 | OXYGEN SATURATION: 99 % | HEART RATE: 90 BPM | WEIGHT: 126 LBS | SYSTOLIC BLOOD PRESSURE: 157 MMHG | HEIGHT: 58 IN

## 2020-03-11 PROCEDURE — 99215 OFFICE O/P EST HI 40 MIN: CPT

## 2020-03-11 PROCEDURE — 93000 ELECTROCARDIOGRAM COMPLETE: CPT

## 2020-03-25 ENCOUNTER — APPOINTMENT (OUTPATIENT)
Dept: CARDIOLOGY | Facility: CLINIC | Age: 85
End: 2020-03-25
Payer: MEDICARE

## 2020-03-25 PROCEDURE — 93306 TTE W/DOPPLER COMPLETE: CPT

## 2020-04-14 ENCOUNTER — APPOINTMENT (OUTPATIENT)
Dept: CARDIOLOGY | Facility: CLINIC | Age: 85
End: 2020-04-14

## 2020-06-04 ENCOUNTER — APPOINTMENT (OUTPATIENT)
Dept: HEPATOLOGY | Facility: CLINIC | Age: 85
End: 2020-06-04

## 2020-07-22 ENCOUNTER — APPOINTMENT (OUTPATIENT)
Dept: CARDIOLOGY | Facility: CLINIC | Age: 85
End: 2020-07-22
Payer: MEDICARE

## 2020-07-22 ENCOUNTER — NON-APPOINTMENT (OUTPATIENT)
Age: 85
End: 2020-07-22

## 2020-07-22 VITALS
DIASTOLIC BLOOD PRESSURE: 85 MMHG | HEART RATE: 58 BPM | BODY MASS INDEX: 27.5 KG/M2 | SYSTOLIC BLOOD PRESSURE: 134 MMHG | WEIGHT: 131 LBS | HEIGHT: 58 IN | OXYGEN SATURATION: 96 %

## 2020-07-22 DIAGNOSIS — R41.3 OTHER AMNESIA: ICD-10-CM

## 2020-07-22 PROCEDURE — 99215 OFFICE O/P EST HI 40 MIN: CPT

## 2020-07-22 PROCEDURE — 93000 ELECTROCARDIOGRAM COMPLETE: CPT

## 2021-01-21 ENCOUNTER — APPOINTMENT (OUTPATIENT)
Dept: CARDIOLOGY | Facility: CLINIC | Age: 86
End: 2021-01-21
Payer: MEDICARE

## 2021-01-21 ENCOUNTER — NON-APPOINTMENT (OUTPATIENT)
Age: 86
End: 2021-01-21

## 2021-01-21 VITALS
WEIGHT: 126 LBS | DIASTOLIC BLOOD PRESSURE: 75 MMHG | OXYGEN SATURATION: 95 % | SYSTOLIC BLOOD PRESSURE: 159 MMHG | HEIGHT: 58 IN | BODY MASS INDEX: 26.45 KG/M2 | HEART RATE: 77 BPM

## 2021-01-21 DIAGNOSIS — R94.31 ABNORMAL ELECTROCARDIOGRAM [ECG] [EKG]: ICD-10-CM

## 2021-01-21 DIAGNOSIS — E55.9 VITAMIN D DEFICIENCY, UNSPECIFIED: ICD-10-CM

## 2021-01-21 PROCEDURE — 99215 OFFICE O/P EST HI 40 MIN: CPT

## 2021-01-21 PROCEDURE — 93000 ELECTROCARDIOGRAM COMPLETE: CPT

## 2021-01-21 RX ORDER — ADHESIVE TAPE 3"X 2.3 YD
50 MCG TAPE, NON-MEDICATED TOPICAL DAILY
Refills: 0 | Status: ACTIVE | COMMUNITY

## 2021-03-10 ENCOUNTER — APPOINTMENT (OUTPATIENT)
Dept: CARDIOLOGY | Facility: CLINIC | Age: 86
End: 2021-03-10
Payer: MEDICARE

## 2021-03-10 PROCEDURE — 93306 TTE W/DOPPLER COMPLETE: CPT

## 2021-07-21 ENCOUNTER — NON-APPOINTMENT (OUTPATIENT)
Age: 86
End: 2021-07-21

## 2021-07-21 ENCOUNTER — APPOINTMENT (OUTPATIENT)
Dept: CARDIOLOGY | Facility: CLINIC | Age: 86
End: 2021-07-21
Payer: MEDICARE

## 2021-07-21 VITALS
BODY MASS INDEX: 25.19 KG/M2 | DIASTOLIC BLOOD PRESSURE: 80 MMHG | SYSTOLIC BLOOD PRESSURE: 144 MMHG | HEART RATE: 62 BPM | WEIGHT: 120 LBS | HEIGHT: 58 IN | OXYGEN SATURATION: 98 %

## 2021-07-21 DIAGNOSIS — R60.9 EDEMA, UNSPECIFIED: ICD-10-CM

## 2021-07-21 DIAGNOSIS — I49.3 VENTRICULAR PREMATURE DEPOLARIZATION: ICD-10-CM

## 2021-07-21 PROCEDURE — 93000 ELECTROCARDIOGRAM COMPLETE: CPT

## 2021-07-21 PROCEDURE — 99215 OFFICE O/P EST HI 40 MIN: CPT

## 2021-08-31 RX ORDER — HYDROCORTISONE 1 %
1 OINTMENT (GRAM) TOPICAL
Qty: 28 | Refills: 0
Start: 2021-08-31

## 2022-01-18 ENCOUNTER — APPOINTMENT (OUTPATIENT)
Dept: CARDIOLOGY | Facility: CLINIC | Age: 87
End: 2022-01-18
Payer: MEDICARE

## 2022-01-18 ENCOUNTER — NON-APPOINTMENT (OUTPATIENT)
Age: 87
End: 2022-01-18

## 2022-01-18 VITALS
BODY MASS INDEX: 25.61 KG/M2 | HEART RATE: 70 BPM | DIASTOLIC BLOOD PRESSURE: 80 MMHG | WEIGHT: 122 LBS | HEIGHT: 58 IN | SYSTOLIC BLOOD PRESSURE: 127 MMHG | OXYGEN SATURATION: 98 %

## 2022-01-18 PROCEDURE — 93000 ELECTROCARDIOGRAM COMPLETE: CPT

## 2022-01-18 PROCEDURE — 99215 OFFICE O/P EST HI 40 MIN: CPT

## 2022-01-18 RX ORDER — RIVASTIGMINE TARTRATE 1.5 MG/1
1.5 CAPSULE ORAL DAILY
Refills: 0 | Status: DISCONTINUED | COMMUNITY
End: 2022-01-18

## 2022-03-08 ENCOUNTER — APPOINTMENT (OUTPATIENT)
Dept: CARDIOLOGY | Facility: CLINIC | Age: 87
End: 2022-03-08
Payer: MEDICARE

## 2022-03-08 PROCEDURE — 93306 TTE W/DOPPLER COMPLETE: CPT

## 2022-05-01 ENCOUNTER — EMERGENCY (EMERGENCY)
Facility: HOSPITAL | Age: 87
LOS: 1 days | Discharge: HOME | End: 2022-05-01
Attending: EMERGENCY MEDICINE | Admitting: EMERGENCY MEDICINE
Payer: MEDICARE

## 2022-05-01 VITALS
OXYGEN SATURATION: 98 % | DIASTOLIC BLOOD PRESSURE: 71 MMHG | SYSTOLIC BLOOD PRESSURE: 115 MMHG | HEART RATE: 65 BPM | TEMPERATURE: 98 F | RESPIRATION RATE: 17 BRPM

## 2022-05-01 VITALS
RESPIRATION RATE: 16 BRPM | HEART RATE: 60 BPM | HEIGHT: 60 IN | SYSTOLIC BLOOD PRESSURE: 105 MMHG | DIASTOLIC BLOOD PRESSURE: 70 MMHG | TEMPERATURE: 98 F | WEIGHT: 119.93 LBS | OXYGEN SATURATION: 97 %

## 2022-05-01 PROCEDURE — 99283 EMERGENCY DEPT VISIT LOW MDM: CPT | Mod: FS

## 2022-05-01 PROCEDURE — 99282 EMERGENCY DEPT VISIT SF MDM: CPT

## 2022-05-01 NOTE — ED PROVIDER NOTE - NSICDXPASTMEDICALHX_GEN_ALL_CORE_FT
PAST MEDICAL HISTORY:  Age Related Osteoporosis     Fungal infection left foot ; tx with cream ; x 3 weeks ; pt reports improvement    Heart murmur     History of Hypertension     [place] (hard of hearing)     MVP (mitral valve prolapse)     Otosclerosis

## 2022-05-01 NOTE — ED PROVIDER NOTE - OBJECTIVE STATEMENT
89 F BIB daughter due to rectal prolapse. Hx 2 surgeries in the past, most recently about 4 years ago by Dr. Vázquez. States prolapse has happened multiple times since then but has been able to push it back in. Uses anucort suppositories. Otherwise has been in [facility].

## 2022-05-01 NOTE — ED ADULT TRIAGE NOTE - CHIEF COMPLAINT QUOTE
2 anal prolapse surgery in the past-  as per daughter- pt rectum has prolapsed again and pt daughter and aide are unable to put it back into place-

## 2022-05-01 NOTE — ED PROVIDER NOTE - NS ED ATTENDING STATEMENT MOD
This was a shared visit with the RA[facility]. I reviewed and verified the documentation and independently performed the documented:

## 2022-05-01 NOTE — ED PROVIDER NOTE - CARE PROVIDER_API CALL
Matt Billy)  ColonRectal Surgery; Surgery  [address] [place] [address]  Phone: [phone]  Fax: [phone]  Follow Up Time:     Siva Barber)  Surgery  [address] [place] [address]  Phone: [phone]  Fax: [phone]  Follow Up Time:

## 2022-05-01 NOTE — ED PROVIDER NOTE - NSFOLLOWUPINSTRUCTIONS_ED_ALL_ED_FT
WHAT YOU NEED TO KNOW:    A rectal prolapse is a condition that causes your rectum to come through your anus. The rectum is the end of your bowel. A prolapse may happen during a bowel movement. A prolapse may happen more often in women after childbirth or who are older than 50 years.    DISCHARGE INSTRUCTIONS:    Call your local emergency number (911 in the ) for any of the following:   •You have trouble breathing.      •Your heart is beating faster than usual.      Return to the emergency department if:   •You have severe pain in your abdomen.      •Your abdomen looks bigger than usual.      •Blood from your rectum soaks through your underwear.      Call your doctor if:   •You have a fever.      •You have nausea or are vomiting.      •You see larger amounts of blood in your bowel movement than before.      •You have questions or concerns about your condition or care.      Medicines: You may need any of the following:   •Stool softeners help prevent constipation.       •Laxatives help your intestines relax and loosen to prevent constipation.       •Take your medicine as directed. Contact your healthcare provider if you think your medicine is not helping or if you have side effects. Tell him or her if you are allergic to any medicine. Keep a list of the medicines, vitamins, and herbs you take. Include the amounts, and when and why you take them. Bring the list or the pill bottles to follow-up visits. Carry your medicine list with you in case of an emergency.      How to do a manual reduction: Manual reduction is a procedure you can do to place your rectum back inside of your anus. Your healthcare provider will show you how to do a manual reduction. You may need a family member to help you with manual reduction. The following are general steps to follow. Your healthcare provider may give you specific steps to follow.  •Your healthcare provider may tell you to apply sugar to your rectum before manual reduction. This may help decrease the swelling of your rectum, and make it easier to put back inside your anus. Ask your provider about how to apply sugar to your rectum.      •Lie on your back with your knees bent.      •Wash your hands and put on gloves. Lubricate your glove with petroleum jelly.      •Hold your rectum on both sides of the anus. Gently apply firm, steady pressure on your rectum and push it into your anus. You may need to apply pressure for several minutes if the bowel is swollen. Inspect your anus. You can use a mirror or have your family member inspect your anus. You should not see the rectum. If a prolapse happens again, you can repeat manual reduction.      •You can hold the rectum in place with gauze and tape across your buttocks. Before you apply gauze, place a quarter size amount of petroleum jelly on the gauze. The petroleum jelly will prevent the gauze from sticking to your rectum. Remove the gauze as directed by your healthcare provider.      Prevent a rectal prolapse:   •Eat more high-fiber foods. This may help decrease constipation by adding bulk and softness to your bowel movements. Your healthcare provider can help you create a meal plan that includes high-fiber foods. High fiber foods include fruit, vegetables, whole-grain breads, and cooked beans.             •Increase the amount of liquid you drink. Liquids can help keep your bowel movements soft and prevent constipation. Ask your healthcare provider how much liquid you should drink each day.      •Exercise your pelvic muscles. Kegel exercises strengthen the pelvic muscles. These exercises involve tightening and relaxing vaginal and rectal muscles. Kegel exercises can make the rectal muscles stronger and improve bowel control. Ask your healthcare provider for more information on how to do Kegel exercises.      •Do not sit for long amounts of time. You may put too much pressure on your anus. Pressure on your anus may cause a rectal prolapse.      Follow up with your doctor as directed: Write down your questions so you remember to ask them during your visits.

## 2022-05-01 NOTE — ED PROVIDER NOTE - ATTENDING APP SHARED VISIT CONTRIBUTION OF CARE
88 yo white female lying prone with small external hemorrhoid with no prolapse at this time. I agree with plan and management outlined by PA.

## 2022-05-01 NOTE — ED PROVIDER NOTE - CLINICAL SUMMARY MEDICAL DECISION MAKING FREE TEXT BOX
89 yo white female with H/O rectal prolapse here with recurrence of same. No abdominal pains. No nausea or vomiting. No fever or chills. This case will require evaluation and possible reduction.

## 2022-05-01 NOTE — ED ADULT NURSE NOTE - OBJECTIVE STATEMENT
Patient brought in by daughter c/o anal prolapse. Patient daughter reports that patient has anal prolapse surgery x2 in the past, Typically when prolapse occurs family assist in pushing prolapse back in and inserting rectal steroid suppository. Daughter report that today they were unsuccessful and were directed to ER. Daughter states she "just wants a provider to push prolapse back in accompanied with steroid suppository. Patient shouts when touch, [place] with ear piece in place. Rhian[place] JOYA

## 2022-05-01 NOTE — ED PROVIDER NOTE - PATIENT PORTAL LINK FT
You can access the FollowMyHealth Patient Portal offered by [facility] by registering at the following website: [email or web address]. By joining [employer]s FollowMyHealth portal, you will also be able to view your health information using other applications (apps) compatible with our system.

## 2022-05-01 NOTE — ED ADULT NURSE NOTE - NSIMPLEMENTINTERV_GEN_ALL_ED
Implemented All Fall with Harm Risk Interventions:  [place] to call system. Call bell, personal items and telephone within reach. Instruct patient to call for assistance. Room bathroom lighting operational. Non-slip footwear when patient is off stretcher. Physically safe environment: no spills, clutter or unnecessary equipment. Stretcher in lowest position, wheels locked, appropriate side rails in place. Provide visual cue, wrist band, yellow gown, etc. Monitor gait and stability. Monitor for mental status changes and reorient to person, place, and time. Review medications for side effects contributing to fall risk. Reinforce activity limits and safety measures with patient and family. Provide visual clues: red socks.

## 2022-05-01 NOTE — ED PROVIDER NOTE - CARE PROVIDERS DIRECT ADDRESSES
,DirectAddress_Unknown,[email or web address]

## 2022-07-19 ENCOUNTER — APPOINTMENT (OUTPATIENT)
Dept: CARDIOLOGY | Facility: CLINIC | Age: 87
End: 2022-07-19

## 2022-09-15 ENCOUNTER — EMERGENCY (EMERGENCY)
Facility: HOSPITAL | Age: 87
LOS: 1 days | Discharge: HOME | End: 2022-09-15
Attending: EMERGENCY MEDICINE | Admitting: EMERGENCY MEDICINE
Payer: MEDICARE

## 2022-09-15 VITALS
SYSTOLIC BLOOD PRESSURE: 157 MMHG | WEIGHT: 136.91 LBS | TEMPERATURE: 99 F | RESPIRATION RATE: 18 BRPM | OXYGEN SATURATION: 96 % | DIASTOLIC BLOOD PRESSURE: 95 MMHG | HEIGHT: 60 IN | HEART RATE: 67 BPM

## 2022-09-15 VITALS
RESPIRATION RATE: 18 BRPM | HEART RATE: 71 BPM | TEMPERATURE: 98 F | OXYGEN SATURATION: 97 % | SYSTOLIC BLOOD PRESSURE: 163 MMHG | DIASTOLIC BLOOD PRESSURE: 84 MMHG

## 2022-09-15 LAB
ALBUMIN SERPL ELPH-MCNC: 3.4 G/DL — SIGNIFICANT CHANGE UP (ref 3.3–5)
ALP SERPL-CCNC: 75 U/L — SIGNIFICANT CHANGE UP (ref 40–120)
ALT FLD-CCNC: 56 U/L — SIGNIFICANT CHANGE UP (ref 12–78)
ANION GAP SERPL CALC-SCNC: 3 MMOL/L — LOW (ref 5–17)
AST SERPL-CCNC: 47 U/L — HIGH (ref 15–37)
BASOPHILS # BLD AUTO: 0.06 K/UL — SIGNIFICANT CHANGE UP (ref 0–0.2)
BASOPHILS NFR BLD AUTO: 0.9 % — SIGNIFICANT CHANGE UP (ref 0–2)
BILIRUB SERPL-MCNC: 0.5 MG/DL — SIGNIFICANT CHANGE UP (ref 0.2–1.2)
BUN SERPL-MCNC: 22 MG/DL — SIGNIFICANT CHANGE UP (ref 7–23)
CALCIUM SERPL-MCNC: 10.1 MG/DL — SIGNIFICANT CHANGE UP (ref 8.5–10.1)
CHLORIDE SERPL-SCNC: 109 MMOL/L — HIGH (ref 96–108)
CK SERPL-CCNC: 76 U/L — SIGNIFICANT CHANGE UP (ref 26–192)
CO2 SERPL-SCNC: 30 MMOL/L — SIGNIFICANT CHANGE UP (ref 22–31)
CREAT SERPL-MCNC: 0.92 MG/DL — SIGNIFICANT CHANGE UP (ref 0.5–1.3)
EGFR: 60 ML/MIN/1.73M2 — SIGNIFICANT CHANGE UP
EOSINOPHIL # BLD AUTO: 0.47 K/UL — SIGNIFICANT CHANGE UP (ref 0–0.5)
EOSINOPHIL NFR BLD AUTO: 7 % — HIGH (ref 0–6)
GLUCOSE SERPL-MCNC: 88 MG/DL — SIGNIFICANT CHANGE UP (ref 70–99)
HCT VFR BLD CALC: 39.4 % — SIGNIFICANT CHANGE UP (ref 34.5–45)
HGB BLD-MCNC: 13.1 G/DL — SIGNIFICANT CHANGE UP (ref 11.5–15.5)
IMM GRANULOCYTES NFR BLD AUTO: 0.1 % — SIGNIFICANT CHANGE UP (ref 0–0.9)
LYMPHOCYTES # BLD AUTO: 1.33 K/UL — SIGNIFICANT CHANGE UP (ref 1–3.3)
LYMPHOCYTES # BLD AUTO: 19.8 % — SIGNIFICANT CHANGE UP (ref 13–44)
MCHC RBC-ENTMCNC: 32.1 PG — SIGNIFICANT CHANGE UP (ref 27–34)
MCHC RBC-ENTMCNC: 33.2 GM/DL — SIGNIFICANT CHANGE UP (ref 32–36)
MCV RBC AUTO: 96.6 FL — SIGNIFICANT CHANGE UP (ref 80–100)
MONOCYTES # BLD AUTO: 0.61 K/UL — SIGNIFICANT CHANGE UP (ref 0–0.9)
MONOCYTES NFR BLD AUTO: 9.1 % — SIGNIFICANT CHANGE UP (ref 2–14)
NEUTROPHILS # BLD AUTO: 4.24 K/UL — SIGNIFICANT CHANGE UP (ref 1.8–7.4)
NEUTROPHILS NFR BLD AUTO: 63.1 % — SIGNIFICANT CHANGE UP (ref 43–77)
NRBC # BLD: 0 /100 WBCS — SIGNIFICANT CHANGE UP (ref 0–0)
NT-PROBNP SERPL-SCNC: 371 PG/ML — SIGNIFICANT CHANGE UP (ref 0–450)
PLATELET # BLD AUTO: 316 K/UL — SIGNIFICANT CHANGE UP (ref 150–400)
POTASSIUM SERPL-MCNC: 3.7 MMOL/L — SIGNIFICANT CHANGE UP (ref 3.5–5.3)
POTASSIUM SERPL-SCNC: 3.7 MMOL/L — SIGNIFICANT CHANGE UP (ref 3.5–5.3)
PROT SERPL-MCNC: 7.4 G/DL — SIGNIFICANT CHANGE UP (ref 6–8.3)
RBC # BLD: 4.08 M/UL — SIGNIFICANT CHANGE UP (ref 3.8–5.2)
RBC # FLD: 14.1 % — SIGNIFICANT CHANGE UP (ref 10.3–14.5)
SODIUM SERPL-SCNC: 142 MMOL/L — SIGNIFICANT CHANGE UP (ref 135–145)
TROPONIN I, HIGH SENSITIVITY RESULT: 29.8 NG/L — SIGNIFICANT CHANGE UP
WBC # BLD: 6.72 K/UL — SIGNIFICANT CHANGE UP (ref 3.8–10.5)
WBC # FLD AUTO: 6.72 K/UL — SIGNIFICANT CHANGE UP (ref 3.8–10.5)

## 2022-09-15 PROCEDURE — 80053 COMPREHEN METABOLIC PANEL: CPT

## 2022-09-15 PROCEDURE — 82550 ASSAY OF CK (CPK): CPT

## 2022-09-15 PROCEDURE — 99284 EMERGENCY DEPT VISIT MOD MDM: CPT

## 2022-09-15 PROCEDURE — 71045 X-RAY EXAM CHEST 1 VIEW: CPT | Mod: 26

## 2022-09-15 PROCEDURE — 93970 EXTREMITY STUDY: CPT | Mod: 26

## 2022-09-15 PROCEDURE — 85025 COMPLETE CBC W/AUTO DIFF WBC: CPT

## 2022-09-15 PROCEDURE — 93970 EXTREMITY STUDY: CPT

## 2022-09-15 PROCEDURE — 83880 ASSAY OF NATRIURETIC PEPTIDE: CPT

## 2022-09-15 PROCEDURE — 99285 EMERGENCY DEPT VISIT HI MDM: CPT | Mod: 25

## 2022-09-15 PROCEDURE — 71045 X-RAY EXAM CHEST 1 VIEW: CPT

## 2022-09-15 PROCEDURE — 36415 COLL VENOUS BLD VENIPUNCTURE: CPT

## 2022-09-15 PROCEDURE — 84484 ASSAY OF TROPONIN QUANT: CPT

## 2022-09-15 RX ORDER — FUROSEMIDE 40 MG
40 TABLET ORAL ONCE
Refills: 0 | Status: DISCONTINUED | OUTPATIENT
Start: 2022-09-15 | End: 2022-09-19

## 2022-09-15 NOTE — ED ADULT NURSE NOTE - OBJECTIVE STATEMENT
Pt received in bed alert and confuse with the c/o b/l LE swelling. Pt [place]. As per Md's orders IV urbano placed blood specimen obtained and sent to the lab. Pt stable and nursing care ongoing and safety maintained.

## 2022-09-15 NOTE — ED ADULT TRIAGE NOTE - CHIEF COMPLAINT QUOTE
Patient BIB daughter from [facility] with c/o B/L lower leg swelling, redness. Daughters report patient has been only given half of her diuretic pill and not on a salt restricted diet.

## 2022-09-15 NOTE — ED PROVIDER NOTE - NSICDXPASTMEDICALHX_GEN_ALL_CORE_FT
PAST MEDICAL HISTORY:  Age Related Osteoporosis     Fungal infection left foot ; tx with cream ; x 3 weeks ; pt reports improvement    Heart murmur     History of Hypertension     [place] (hard of hearing)     MVP (mitral valve prolapse)     Otosclerosis

## 2022-09-15 NOTE — ED ADULT NURSE NOTE - NSIMPLEMENTINTERV_GEN_ALL_ED
Implemented All Fall with Harm Risk Interventions:  [place] to call system. Call bell, personal items and telephone within reach. Instruct patient to call for assistance. Room bathroom lighting operational. Non-slip footwear when patient is off stretcher. Physically safe environment: no spills, clutter or unnecessary equipment. Stretcher in lowest position, wheels locked, appropriate side rails in place. Provide visual cue, wrist band, yellow gown, etc. Monitor gait and stability. Monitor for mental status changes and reorient to person, place, and time. Review medications for side effects contributing to fall risk. Reinforce activity limits and safety measures with patient and family. Provide visual clues: red socks.

## 2022-09-15 NOTE — ED ADULT NURSE NOTE - CHIEF COMPLAINT QUOTE
Patient BIB daughter from [facility] with c/o B/L lower leg swelling, redness. Daughters report patient has been only given half of her diuretic pill and not on a salt restricted diet.

## 2022-09-15 NOTE — ED PROVIDER NOTE - NSFOLLOWUPINSTRUCTIONS_ED_ALL_ED_FT
1) Follow-up with your Primary Medical Doctor or referred doctor. Call today / next business day for prompt follow-up.  2) Return to Emergency room for any worsening or persistent pain, weakness, fever, difficulty breathing, dizziness, vomiting, increased or persistent swelling, increased redness, or any other concerning symptoms.  3) See attached instruction sheets for additional information, including information regarding signs and symptoms to look out for, reasons to seek immediate care and other important instructions.  4) Increase Lasix to 40mg twice daily  5) Low Salt Diet

## 2022-09-15 NOTE — ED PROVIDER NOTE - CONSTITUTIONAL, MLM
Well appearing, awake, alert, oriented to person, place, situation and in no apparent distress. normal...

## 2022-09-15 NOTE — ED PROVIDER NOTE - PATIENT PORTAL LINK FT
You can access the FollowMyHealth Patient Portal offered by [facility] by registering at the following website: [email or web address]. By joining [employer]’s FollowMyHealth portal, you will also be able to view your health information using other applications (apps) compatible with our system.

## 2022-09-15 NOTE — ED ADULT NURSE NOTE - NSICDXPASTMEDICALHX_GEN_ALL_CORE_FT
PAST MEDICAL HISTORY:  Age Related Osteoporosis     Fungal infection left foot ; tx with cream ; x 3 weeks ; pt reports improvement    Heart murmur     History of Hypertension     [place] (hard of hearing)     MVP (mitral valve prolapse)     Otosclerosis

## 2022-09-15 NOTE — ED PROVIDER NOTE - OBJECTIVE STATEMENT
89 yo F P/w bl le edema noticed today by caregiver. Pt recently into a SNF since late Aug - pt was reportedly on Lasix 40mg bid, now only on once daily - caregiver states possible error. Pt with no acute dyspnea. no chest pain. no abd pain. no n/v/d. no neck/. back pain. no cough / uri. no recent travel. no trauma. no fall. no other acute co or changes.

## 2022-09-15 NOTE — ED PROVIDER NOTE - PROGRESS NOTE DETAILS
Patient doing well, no acute complaints at this time.  Discussed with patient's daughter at length regarding all findings, will recommend resuming her Lasix at 40 mg twice daily.  Daughter also states that patient was put on a regular salt diet instead of a low-salt diet.  In fact patient had hawkins this morning.  Will discharge patient back to facility and will send recommendations.  They agree with plan and will follow-up with at  Facility.

## 2022-09-23 DIAGNOSIS — R06.02 SHORTNESS OF BREATH: ICD-10-CM

## 2022-09-26 ENCOUNTER — APPOINTMENT (OUTPATIENT)
Dept: CARDIOLOGY | Facility: CLINIC | Age: 87
End: 2022-09-26

## 2022-09-26 PROCEDURE — 93306 TTE W/DOPPLER COMPLETE: CPT

## 2022-12-28 ENCOUNTER — EMERGENCY (EMERGENCY)
Facility: HOSPITAL | Age: 87
LOS: 1 days | Discharge: HOME | End: 2022-12-28
Attending: EMERGENCY MEDICINE | Admitting: EMERGENCY MEDICINE
Payer: MEDICARE

## 2022-12-28 VITALS
SYSTOLIC BLOOD PRESSURE: 151 MMHG | TEMPERATURE: 97 F | DIASTOLIC BLOOD PRESSURE: 82 MMHG | OXYGEN SATURATION: 97 % | RESPIRATION RATE: 16 BRPM | HEART RATE: 63 BPM

## 2022-12-28 VITALS
OXYGEN SATURATION: 98 % | HEART RATE: 74 BPM | TEMPERATURE: 99 F | DIASTOLIC BLOOD PRESSURE: 80 MMHG | RESPIRATION RATE: 16 BRPM | SYSTOLIC BLOOD PRESSURE: 125 MMHG

## 2022-12-28 LAB
ALBUMIN SERPL ELPH-MCNC: 3.2 G/DL — LOW (ref 3.3–5)
ALP SERPL-CCNC: 70 U/L — SIGNIFICANT CHANGE UP (ref 40–120)
ALT FLD-CCNC: 47 U/L — SIGNIFICANT CHANGE UP (ref 12–78)
ANION GAP SERPL CALC-SCNC: 5 MMOL/L — SIGNIFICANT CHANGE UP (ref 5–17)
APTT BLD: 28.3 SEC — SIGNIFICANT CHANGE UP (ref 27.5–35.5)
AST SERPL-CCNC: 54 U/L — HIGH (ref 15–37)
BASOPHILS # BLD AUTO: 0.06 K/UL — SIGNIFICANT CHANGE UP (ref 0–0.2)
BASOPHILS NFR BLD AUTO: 0.8 % — SIGNIFICANT CHANGE UP (ref 0–2)
BILIRUB SERPL-MCNC: 0.6 MG/DL — SIGNIFICANT CHANGE UP (ref 0.2–1.2)
BUN SERPL-MCNC: 33 MG/DL — HIGH (ref 7–23)
CALCIUM SERPL-MCNC: 10.4 MG/DL — HIGH (ref 8.5–10.1)
CHLORIDE SERPL-SCNC: 106 MMOL/L — SIGNIFICANT CHANGE UP (ref 96–108)
CK MB CFR SERPL CALC: 1.5 NG/ML — SIGNIFICANT CHANGE UP (ref 0–3.6)
CO2 SERPL-SCNC: 27 MMOL/L — SIGNIFICANT CHANGE UP (ref 22–31)
CREAT SERPL-MCNC: 1 MG/DL — SIGNIFICANT CHANGE UP (ref 0.5–1.3)
EGFR: 54 ML/MIN/1.73M2 — LOW
EOSINOPHIL # BLD AUTO: 0.51 K/UL — HIGH (ref 0–0.5)
EOSINOPHIL NFR BLD AUTO: 6.9 % — HIGH (ref 0–6)
GLUCOSE SERPL-MCNC: 94 MG/DL — SIGNIFICANT CHANGE UP (ref 70–99)
HCT VFR BLD CALC: 38.3 % — SIGNIFICANT CHANGE UP (ref 34.5–45)
HGB BLD-MCNC: 13.1 G/DL — SIGNIFICANT CHANGE UP (ref 11.5–15.5)
IMM GRANULOCYTES NFR BLD AUTO: 0.3 % — SIGNIFICANT CHANGE UP (ref 0–0.9)
INR BLD: 1.03 RATIO — SIGNIFICANT CHANGE UP (ref 0.88–1.16)
LYMPHOCYTES # BLD AUTO: 1.87 K/UL — SIGNIFICANT CHANGE UP (ref 1–3.3)
LYMPHOCYTES # BLD AUTO: 25.2 % — SIGNIFICANT CHANGE UP (ref 13–44)
MAGNESIUM SERPL-MCNC: 2.5 MG/DL — SIGNIFICANT CHANGE UP (ref 1.6–2.6)
MCHC RBC-ENTMCNC: 32.4 PG — SIGNIFICANT CHANGE UP (ref 27–34)
MCHC RBC-ENTMCNC: 34.2 GM/DL — SIGNIFICANT CHANGE UP (ref 32–36)
MCV RBC AUTO: 94.8 FL — SIGNIFICANT CHANGE UP (ref 80–100)
MONOCYTES # BLD AUTO: 0.67 K/UL — SIGNIFICANT CHANGE UP (ref 0–0.9)
MONOCYTES NFR BLD AUTO: 9 % — SIGNIFICANT CHANGE UP (ref 2–14)
NEUTROPHILS # BLD AUTO: 4.28 K/UL — SIGNIFICANT CHANGE UP (ref 1.8–7.4)
NEUTROPHILS NFR BLD AUTO: 57.8 % — SIGNIFICANT CHANGE UP (ref 43–77)
NRBC # BLD: 0 /100 WBCS — SIGNIFICANT CHANGE UP (ref 0–0)
NT-PROBNP SERPL-SCNC: 523 PG/ML — HIGH (ref 0–450)
PLATELET # BLD AUTO: 264 K/UL — SIGNIFICANT CHANGE UP (ref 150–400)
POTASSIUM SERPL-MCNC: 4.6 MMOL/L — SIGNIFICANT CHANGE UP (ref 3.5–5.3)
POTASSIUM SERPL-SCNC: 4.6 MMOL/L — SIGNIFICANT CHANGE UP (ref 3.5–5.3)
PROT SERPL-MCNC: 7.1 G/DL — SIGNIFICANT CHANGE UP (ref 6–8.3)
PROTHROM AB SERPL-ACNC: 12.1 SEC — SIGNIFICANT CHANGE UP (ref 10.5–13.4)
RBC # BLD: 4.04 M/UL — SIGNIFICANT CHANGE UP (ref 3.8–5.2)
RBC # FLD: 14.2 % — SIGNIFICANT CHANGE UP (ref 10.3–14.5)
SARS-COV-2 RNA SPEC QL NAA+PROBE: DETECTED
SODIUM SERPL-SCNC: 138 MMOL/L — SIGNIFICANT CHANGE UP (ref 135–145)
TROPONIN I, HIGH SENSITIVITY RESULT: 105.6 NG/L — HIGH
WBC # BLD: 7.41 K/UL — SIGNIFICANT CHANGE UP (ref 3.8–10.5)
WBC # FLD AUTO: 7.41 K/UL — SIGNIFICANT CHANGE UP (ref 3.8–10.5)

## 2022-12-28 PROCEDURE — 84484 ASSAY OF TROPONIN QUANT: CPT

## 2022-12-28 PROCEDURE — 87635 SARS-COV-2 COVID-19 AMP PRB: CPT

## 2022-12-28 PROCEDURE — 83735 ASSAY OF MAGNESIUM: CPT

## 2022-12-28 PROCEDURE — 99285 EMERGENCY DEPT VISIT HI MDM: CPT

## 2022-12-28 PROCEDURE — 99285 EMERGENCY DEPT VISIT HI MDM: CPT | Mod: 25

## 2022-12-28 PROCEDURE — 85025 COMPLETE CBC W/AUTO DIFF WBC: CPT

## 2022-12-28 PROCEDURE — 93970 EXTREMITY STUDY: CPT | Mod: 26

## 2022-12-28 PROCEDURE — 96374 THER/PROPH/DIAG INJ IV PUSH: CPT

## 2022-12-28 PROCEDURE — 85610 PROTHROMBIN TIME: CPT

## 2022-12-28 PROCEDURE — 93005 ELECTROCARDIOGRAM TRACING: CPT

## 2022-12-28 PROCEDURE — 80053 COMPREHEN METABOLIC PANEL: CPT

## 2022-12-28 PROCEDURE — 93970 EXTREMITY STUDY: CPT

## 2022-12-28 PROCEDURE — 71045 X-RAY EXAM CHEST 1 VIEW: CPT | Mod: 26

## 2022-12-28 PROCEDURE — 93010 ELECTROCARDIOGRAM REPORT: CPT

## 2022-12-28 PROCEDURE — 85730 THROMBOPLASTIN TIME PARTIAL: CPT

## 2022-12-28 PROCEDURE — 82553 CREATINE MB FRACTION: CPT

## 2022-12-28 PROCEDURE — 36415 COLL VENOUS BLD VENIPUNCTURE: CPT

## 2022-12-28 PROCEDURE — 83880 ASSAY OF NATRIURETIC PEPTIDE: CPT

## 2022-12-28 PROCEDURE — 71045 X-RAY EXAM CHEST 1 VIEW: CPT

## 2022-12-28 RX ORDER — FUROSEMIDE 40 MG
40 TABLET ORAL ONCE
Refills: 0 | Status: COMPLETED | OUTPATIENT
Start: 2022-12-28 | End: 2022-12-28

## 2022-12-28 RX ADMIN — Medication 40 MILLIGRAM(S): at 17:37

## 2022-12-28 NOTE — ED ADULT NURSE NOTE - OBJECTIVE STATEMENT
pt is A&oX4, presented to the ER for BL swelling of the LE, edema noted, pulses+, ROMX4, pt denies any new onset of pain at this moment, resp even and unlabored, nad noted, will continue to monitor

## 2022-12-28 NOTE — ED PROVIDER NOTE - NSICDXPASTMEDICALHX_GEN_ALL_CORE_FT
PAST MEDICAL HISTORY:  Age Related Osteoporosis     Fungal infection left foot ; tx with cream ; x 3 weeks ; pt reports improvement    Heart murmur     History of Hypertension     [place] (hard of hearing)     MVP (mitral valve prolapse)     Otosclerosis

## 2022-12-28 NOTE — ED PROVIDER NOTE - OBJECTIVE STATEMENT
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  Patient niece is at the bedside and states that she was with her today and noticed that her legs were swollen, patient offers no complaints, patient is taking her Lasix as prescribed.

## 2022-12-28 NOTE — ED PROVIDER NOTE - PROGRESS NOTE DETAILS
Patient in no acute distress no chest pain, labs x-rays EKG reviewed daughter at the bedside, stressed importance of low-sodium diet, daughter states that at the facility they gave her foods that are high in salt, will speak with staff regarding low-salt diet we will continue with Lasix and will follow-up with cardiologist as outpatient.  Noted patient tested positive for COVID, patient in no acute distress, unknown when patient received COVID daughter states she does not want any treatment for the COVID and will take the patient home.

## 2022-12-28 NOTE — ED PROVIDER NOTE - CLINICAL SUMMARY MEDICAL DECISION MAKING FREE TEXT BOX
89-year-old female with bilateral lower extremity edema, will follow-up ultrasound rule out DVT, will send CBC CMP proBNP, chest x-ray rule out CHF exacerbation, will get Lasix IV, EKG rule out ACS, rule reevaluate patient.

## 2022-12-28 NOTE — ED PROVIDER NOTE - CARE PROVIDER_API CALL
Paras Ogden (MD)  Cardiovascular Disease; Internal Medicine  [address] [place] [address]  Phone: [phone]  Fax: [phone]  Follow Up Time:

## 2022-12-28 NOTE — ED PROVIDER NOTE - NSFOLLOWUPINSTRUCTIONS_ED_ALL_ED_FT
DASH stands for Dietary Approaches to Stop Hypertension. The DASH eating plan is a healthy eating plan that has been shown to:  •Reduce high blood pressure (hypertension).      •Reduce your risk for type 2 diabetes, heart disease, and stroke.      •Help with weight loss.        What are tips for following this plan?    Reading food labels     •Check food labels for the amount of salt (sodium) per serving. Choose foods with less than 5 percent of the Daily Value of sodium. Generally, foods with less than 300 milligrams (mg) of sodium per serving fit into this eating plan.      •To find whole grains, look for the word "whole" as the first word in the ingredient list.      Shopping     •Buy products labeled as "low-sodium" or "no salt added."      •Buy fresh foods. Avoid canned foods and pre-made or frozen meals.      Cooking     •Avoid adding salt when cooking. Use salt-free seasonings or herbs instead of table salt or sea salt. Check with your health care provider or pharmacist before using salt substitutes.      • Do not vargas foods. Cook foods using healthy methods such as baking, boiling, grilling, roasting, and broiling instead.      •Cook with heart-healthy oils, such as olive, canola, avocado, soybean, or sunflower oil.        Meal planning    •Eat a balanced diet that includes:  •4 or more servings of fruits and 4 or more servings of vegetables each day. Try to fill one-half of your plate with fruits and vegetables.      •6–8 servings of whole grains each day.      •Less than 6 oz (170 g) of lean meat, poultry, or fish each day. A 3-oz (85-g) serving of meat is about the same size as a deck of cards. One egg equals 1 oz (28 g).      •2–3 servings of low-fat dairy each day. One serving is 1 cup (237 mL).      •1 serving of nuts, seeds, or beans 5 times each week.      •2–3 servings of heart-healthy fats. Healthy fats called omega-3 fatty acids are found in foods such as walnuts, flaxseeds, fortified milks, and eggs. These fats are also found in cold-water fish, such as sardines, salmon, and mackerel.      •Limit how much you eat of:  •Canned or prepackaged foods.      •Food that is high in trans fat, such as some fried foods.      •Food that is high in saturated fat, such as fatty meat.      •Desserts and other sweets, sugary drinks, and other foods with added sugar.      •Full-fat dairy products.        • Do not salt foods before eating.      • Do not eat more than 4 egg yolks a week.      •Try to eat at least 2 vegetarian meals a week.      •Eat more home-cooked food and less restaurant, buffet, and fast food.      Lifestyle     •When eating at a restaurant, ask that your food be prepared with less salt or no salt, if possible.    •If you drink alcohol:•Limit how much you use to:  •0–1 drink a day for women who are not pregnant.      •0–2 drinks a day for men.        •Be aware of how much alcohol is in your drink. In the [place] one drink equals one 12 oz bottle of beer (355 mL), one 5 oz glass of wine (148 mL), or one 1½ oz glass of hard liquor (44 mL).        General information     •Avoid eating more than 2,300 mg of salt a day. If you have hypertension, you may need to reduce your sodium intake to 1,500 mg a day.      •Work with your health care provider to maintain a healthy body weight or to lose weight. Ask what an ideal weight is for you.      •Get at least 30 minutes of exercise that causes your heart to beat faster (aerobic exercise) most days of the week. Activities may include walking, swimming, or biking.      •Work with your health care provider or dietitian to adjust your eating plan to your individual calorie needs.        What foods should I eat?    Fruits     All fresh, dried, or frozen fruit. Canned fruit in natural juice (without added sugar).    Vegetables     Fresh or frozen vegetables (raw, steamed, roasted, or grilled). Low-sodium or reduced-sodium tomato and vegetable juice. Low-sodium or reduced-sodium tomato sauce and tomato paste. Low-sodium or reduced-sodium canned vegetables.    Grains     Whole-grain or whole-wheat bread. Whole-grain or whole-wheat pasta. Brown rice. Oatmeal. Quinoa. Bulgur. Whole-grain and low-sodium cereals. Jess bread. Low-fat, low-sodium crackers. Whole-wheat flour tortillas.    Meats and other proteins     Skinless chicken or turkey. Ground chicken or turkey. Pork with fat trimmed off. Fish and seafood. Egg whites. Dried beans, peas, or lentils. Unsalted nuts, nut butters, and seeds. Unsalted canned beans. Lean cuts of beef with fat trimmed off. Low-sodium, lean precooked or cured meat, such as sausages or meat loaves.    Dairy     Low-fat (1%) or fat-free (skim) milk. Reduced-fat, low-fat, or fat-free cheeses. Nonfat, low-sodium ricotta or cottage cheese. Low-fat or nonfat yogurt. Low-fat, low-sodium cheese.    Fats and oils     Soft margarine without trans fats. Vegetable oil. Reduced-fat, low-fat, or light mayonnaise and salad dressings (reduced-sodium). Canola, safflower, olive, avocado, soybean, and sunflower oils. Avocado.    Seasonings and condiments     Herbs. Spices. Seasoning mixes without salt.    Other foods     Unsalted popcorn and pretzels. Fat-free sweets.    The items listed above may not be a complete list of foods and beverages you can eat. Contact a dietitian for more information.       What foods should I avoid?    Fruits     Canned fruit in a light or heavy syrup. Fried fruit. Fruit in cream or butter sauce.    Vegetables     Creamed or fried vegetables. Vegetables in a cheese sauce. Regular canned vegetables (not low-sodium or reduced-sodium). Regular canned tomato sauce and paste (not low-sodium or reduced-sodium). Regular tomato and vegetable juice (not low-sodium or reduced-sodium). Pickles. Olives.    Grains     Baked goods made with fat, such as croissants, muffins, or some breads. Dry pasta or rice meal packs.    Meats and other proteins     Fatty cuts of meat. Ribs. Fried meat. Mckenzie. Bologna, salami, and other precooked or cured meats, such as sausages or meat loaves. Fat from the back of a pig (fatback). Bratwurst. Salted nuts and seeds. Canned beans with added salt. Canned or smoked fish. Whole eggs or egg yolks. Chicken or turkey with skin.    Dairy     Whole or 2% milk, cream, and half-and-half. Whole or full-fat cream cheese. Whole-fat or sweetened yogurt. Full-fat cheese. Nondairy creamers. Whipped toppings. Processed cheese and cheese spreads.    Fats and oils     Butter. Stick margarine. Lard. Shortening. Ghee. Mckenzie fat. Tropical oils, such as coconut, palm kernel, or palm oil.    Seasonings and condiments     Onion salt, garlic salt, seasoned salt, table salt, and sea salt. Worcestershire sauce. Tartar sauce. Barbecue sauce. Teriyaki sauce. Soy sauce, including reduced-sodium. Steak sauce. Canned and packaged gravies. Fish sauce. Oyster sauce. Cocktail sauce. Store-bought horseradish. Ketchup. Mustard. Meat flavorings and tenderizers. Bouillon cubes. Hot sauces. Pre-made or packaged marinades. Pre-made or packaged taco seasonings. Relishes. Regular salad dressings.    Other foods     Salted popcorn and pretzels.    The items listed above may not be a complete list of foods and beverages you should avoid. Contact a dietitian for more information.       Where to find more information    •National Heart, Lung, and Blood [place]: www.nhlbi.nih.gov      •American Heart Association: www.heart.org      •Academy of Nutrition and Dietetics: www.eatright.org      •National Kidney Foundation: www.kidney.org        Summary    •The DASH eating plan is a healthy eating plan that has been shown to reduce high blood pressure (hypertension). It may also reduce your risk for type 2 diabetes, heart disease, and stroke.      •When on the DASH eating plan, aim to eat more fresh fruits and vegetables, whole grains, lean proteins, low-fat dairy, and heart-healthy fats.      •With the DASH eating plan, you should limit salt (sodium) intake to 2,300 mg a day. If you have hypertension, you may need to reduce your sodium intake to 1,500 mg a day.      •Work with your health care provider or dietitian to adjust your eating plan to your individual calorie needs.      This information is not intended to replace advice given to you by your health care provider. Make sure you discuss any questions you have with your health care provider.      Document Revised: 11/20/2020 Document Reviewed: 11/20/2020    Elsevier Patient Education © 2022 Elsevier Inc.

## 2022-12-28 NOTE — ED PROVIDER NOTE - PATIENT PORTAL LINK FT
You can access the FollowMyHealth Patient Portal offered by [facility] by registering at the following website: [email or web address]. By joining [employer]’s FollowMyHealth portal, you will also be able to view your health information using other applications (apps) compatible with our system.

## 2023-01-09 ENCOUNTER — NON-APPOINTMENT (OUTPATIENT)
Age: 88
End: 2023-01-09

## 2023-01-09 ENCOUNTER — APPOINTMENT (OUTPATIENT)
Dept: CARDIOLOGY | Facility: CLINIC | Age: 88
End: 2023-01-09
Payer: MEDICARE

## 2023-01-09 VITALS
HEART RATE: 88 BPM | BODY MASS INDEX: 25.61 KG/M2 | HEIGHT: 58 IN | DIASTOLIC BLOOD PRESSURE: 82 MMHG | WEIGHT: 122 LBS | SYSTOLIC BLOOD PRESSURE: 143 MMHG | OXYGEN SATURATION: 98 %

## 2023-01-09 DIAGNOSIS — I49.1 ATRIAL PREMATURE DEPOLARIZATION: ICD-10-CM

## 2023-01-09 PROCEDURE — 99215 OFFICE O/P EST HI 40 MIN: CPT

## 2023-01-09 PROCEDURE — 93000 ELECTROCARDIOGRAM COMPLETE: CPT

## 2023-01-09 RX ORDER — MEMANTINE HYDROCHLORIDE 10 MG/1
10 TABLET, FILM COATED ORAL DAILY
Refills: 0 | Status: ACTIVE | COMMUNITY

## 2023-01-09 RX ORDER — LOSARTAN POTASSIUM 25 MG/1
25 TABLET, FILM COATED ORAL DAILY
Qty: 90 | Refills: 3 | Status: ACTIVE | COMMUNITY

## 2023-04-17 ENCOUNTER — APPOINTMENT (OUTPATIENT)
Dept: CARDIOLOGY | Facility: CLINIC | Age: 88
End: 2023-04-17
Payer: MEDICARE

## 2023-04-17 ENCOUNTER — NON-APPOINTMENT (OUTPATIENT)
Age: 88
End: 2023-04-17

## 2023-04-17 VITALS
HEART RATE: 81 BPM | WEIGHT: 135 LBS | OXYGEN SATURATION: 98 % | HEIGHT: 58 IN | SYSTOLIC BLOOD PRESSURE: 161 MMHG | BODY MASS INDEX: 28.34 KG/M2 | DIASTOLIC BLOOD PRESSURE: 97 MMHG

## 2023-04-17 DIAGNOSIS — Z86.79 PERSONAL HISTORY OF OTHER DISEASES OF THE CIRCULATORY SYSTEM: ICD-10-CM

## 2023-04-17 DIAGNOSIS — I10 ESSENTIAL (PRIMARY) HYPERTENSION: ICD-10-CM

## 2023-04-17 DIAGNOSIS — M79.89 OTHER SPECIFIED SOFT TISSUE DISORDERS: ICD-10-CM

## 2023-04-17 DIAGNOSIS — I34.0 NONRHEUMATIC MITRAL (VALVE) INSUFFICIENCY: ICD-10-CM

## 2023-04-17 PROCEDURE — 99215 OFFICE O/P EST HI 40 MIN: CPT

## 2023-04-17 PROCEDURE — 93000 ELECTROCARDIOGRAM COMPLETE: CPT

## 2023-05-20 RX ORDER — POTASSIUM CHLORIDE 750 MG/1
10 TABLET, FILM COATED, EXTENDED RELEASE ORAL DAILY
Qty: 180 | Refills: 1 | Status: ACTIVE | COMMUNITY

## 2023-05-20 RX ORDER — METOLAZONE 2.5 MG/1
2.5 TABLET ORAL WEEKLY
Qty: 24 | Refills: 1 | Status: ACTIVE | COMMUNITY
Start: 2023-04-17

## 2023-05-21 ENCOUNTER — EMERGENCY (EMERGENCY)
Facility: HOSPITAL | Age: 88
LOS: 1 days | Discharge: HOME | End: 2023-05-21
Attending: EMERGENCY MEDICINE | Admitting: EMERGENCY MEDICINE
Payer: MEDICARE

## 2023-05-21 VITALS
RESPIRATION RATE: 18 BRPM | OXYGEN SATURATION: 93 % | TEMPERATURE: 97 F | HEART RATE: 64 BPM | DIASTOLIC BLOOD PRESSURE: 84 MMHG | SYSTOLIC BLOOD PRESSURE: 120 MMHG | WEIGHT: 119.93 LBS | HEIGHT: 60 IN

## 2023-05-21 PROCEDURE — 70450 CT HEAD/BRAIN W/O DYE: CPT | Mod: 26,MA

## 2023-05-21 PROCEDURE — 99284 EMERGENCY DEPT VISIT MOD MDM: CPT

## 2023-05-21 PROCEDURE — 70450 CT HEAD/BRAIN W/O DYE: CPT | Mod: MA

## 2023-05-21 PROCEDURE — 72125 CT NECK SPINE W/O DYE: CPT | Mod: MA

## 2023-05-21 PROCEDURE — 99284 EMERGENCY DEPT VISIT MOD MDM: CPT | Mod: 25

## 2023-05-21 PROCEDURE — 72125 CT NECK SPINE W/O DYE: CPT | Mod: 26,MA

## 2023-05-21 NOTE — ED ADULT NURSE NOTE - NSICDXPASTMEDICALHX_GEN_ALL_CORE_FT
PAST MEDICAL HISTORY:  Age Related Osteoporosis     Fungal infection left foot ; tx with cream ; x 3 weeks ; pt reports improvement    Heart murmur     History of Hypertension     [place] (hard of hearing)     MVP (mitral valve prolapse)     Otosclerosis

## 2023-05-21 NOTE — ED PROVIDER NOTE - CARE PROVIDER_API CALL
Fredrick Kaba  NEUROLOGY  [address] [place] [address]  Phone: [phone]  Fax: [phone]  Follow Up Time:

## 2023-05-21 NOTE — ED PROVIDER NOTE - MUSCULOSKELETAL, MLM
Spine appears normal, range of motion is not limited, no muscle or joint tenderness. Pelvis and hips all non tender.

## 2023-05-21 NOTE — ED PROVIDER NOTE - DIFFERENTIAL DIAGNOSIS
Differential diagnosis for this non syncopal fall include blunt head trauma with possible subdural hematoma doubt epidural hematoma doubt intracerebral bleed doubt subarachnoid hemorrhage doubt C-spine fracture doubt pelvic or hip fracture doubt. Differential Diagnosis

## 2023-05-21 NOTE — ED PROVIDER NOTE - NSICDXPASTMEDICALHX_GEN_ALL_CORE_FT
PAST MEDICAL HISTORY:  Age Related Osteoporosis     Fungal infection left foot ; tx with cream ; x 3 weeks ; pt reports improvement    Heart murmur     History of Hypertension     [place] (hard of hearing)     MVP (mitral valve prolapse)     Otosclerosis

## 2023-05-21 NOTE — ED ADULT NURSE NOTE - OBJECTIVE STATEMENT
89y/o female received in t3. pt arrives from the [facility]. pt c/o fall, unsure if she hit her head. denies dizziness, lightheadedness, blurred vision. respirations even and unlabored, completing full sentences. awaiting CT results. md at bedside for eval. bed in lowest position, side rails up, call bell in hand, safety maintained. awaiting further orders. will continue to monitor.

## 2023-05-21 NOTE — ED PROVIDER NOTE - OBJECTIVE STATEMENT
Patient is a 89-year-old white female residence of [facility] with history of hypertension mitral valve prolapse osteoporosis and dementia who was witnessed on video to have a non syncopal fall unknown whether or not head was struck here now for evaluation.  As result of patient's dementia history is limited but she denies pain anywhere.  No other information available at this time.

## 2023-05-21 NOTE — ED PROVIDER NOTE - NSFOLLOWUPINSTRUCTIONS_ED_ALL_ED_FT
Rest.  May take Tylenol for pain if needed.  Follow-up with your primary care physician this week for reevaluation.  Return here if needed.

## 2023-05-21 NOTE — ED PROVIDER NOTE - PATIENT PORTAL LINK FT
You can access the FollowMyHealth Patient Portal offered by [facility] by registering at the following website: [email or web address]. By joining [employer]’s FollowMyHealth portal, you will also be able to view your health information using other applications (apps) compatible with our system.

## 2023-05-21 NOTE — ED PROVIDER NOTE - CONSTITUTIONAL, MLM
normal... Well appearing, elderly white female, awake, alert, oriented to person, in no apparent distress.

## 2023-05-22 ENCOUNTER — NON-APPOINTMENT (OUTPATIENT)
Age: 88
End: 2023-05-22

## 2023-07-17 ENCOUNTER — APPOINTMENT (OUTPATIENT)
Dept: CARDIOLOGY | Facility: CLINIC | Age: 88
End: 2023-07-17

## 2023-09-05 ENCOUNTER — EMERGENCY (EMERGENCY)
Facility: HOSPITAL | Age: 88
LOS: 1 days | Discharge: HOME | End: 2023-09-05
Attending: EMERGENCY MEDICINE | Admitting: EMERGENCY MEDICINE
Payer: MEDICARE

## 2023-09-05 VITALS
HEIGHT: 62 IN | OXYGEN SATURATION: 94 % | SYSTOLIC BLOOD PRESSURE: 129 MMHG | TEMPERATURE: 97 F | HEART RATE: 100 BPM | WEIGHT: 119.93 LBS | RESPIRATION RATE: 18 BRPM | DIASTOLIC BLOOD PRESSURE: 91 MMHG

## 2023-09-05 VITALS
DIASTOLIC BLOOD PRESSURE: 81 MMHG | SYSTOLIC BLOOD PRESSURE: 125 MMHG | RESPIRATION RATE: 18 BRPM | OXYGEN SATURATION: 95 % | HEART RATE: 95 BPM | TEMPERATURE: 98 F

## 2023-09-05 LAB
ALBUMIN SERPL ELPH-MCNC: 3.9 G/DL — SIGNIFICANT CHANGE UP (ref 3.3–5)
ALP SERPL-CCNC: 92 U/L — SIGNIFICANT CHANGE UP (ref 40–120)
ALT FLD-CCNC: 28 U/L — SIGNIFICANT CHANGE UP (ref 12–78)
ANION GAP SERPL CALC-SCNC: 8 MMOL/L — SIGNIFICANT CHANGE UP (ref 5–17)
APPEARANCE UR: CLEAR — SIGNIFICANT CHANGE UP
APTT BLD: 25.6 SEC — SIGNIFICANT CHANGE UP (ref 24.5–35.6)
AST SERPL-CCNC: 27 U/L — SIGNIFICANT CHANGE UP (ref 15–37)
BASOPHILS # BLD AUTO: 0.05 K/UL — SIGNIFICANT CHANGE UP (ref 0–0.2)
BASOPHILS NFR BLD AUTO: 0.5 % — SIGNIFICANT CHANGE UP (ref 0–2)
BILIRUB SERPL-MCNC: 0.9 MG/DL — SIGNIFICANT CHANGE UP (ref 0.2–1.2)
BILIRUB UR-MCNC: NEGATIVE — SIGNIFICANT CHANGE UP
BUN SERPL-MCNC: 48 MG/DL — HIGH (ref 7–23)
CALCIUM SERPL-MCNC: 12.9 MG/DL — HIGH (ref 8.5–10.1)
CHLORIDE SERPL-SCNC: 112 MMOL/L — HIGH (ref 96–108)
CO2 SERPL-SCNC: 30 MMOL/L — SIGNIFICANT CHANGE UP (ref 22–31)
COLOR SPEC: YELLOW — SIGNIFICANT CHANGE UP
CREAT SERPL-MCNC: 1.8 MG/DL — HIGH (ref 0.5–1.3)
DIFF PNL FLD: NEGATIVE — SIGNIFICANT CHANGE UP
EGFR: 26 ML/MIN/1.73M2 — LOW
EOSINOPHIL # BLD AUTO: 0.07 K/UL — SIGNIFICANT CHANGE UP (ref 0–0.5)
EOSINOPHIL NFR BLD AUTO: 0.7 % — SIGNIFICANT CHANGE UP (ref 0–6)
EPI CELLS # UR: SIGNIFICANT CHANGE UP
FLUAV AG NPH QL: SIGNIFICANT CHANGE UP
FLUBV AG NPH QL: SIGNIFICANT CHANGE UP
GLUCOSE SERPL-MCNC: 99 MG/DL — SIGNIFICANT CHANGE UP (ref 70–99)
GLUCOSE UR QL: NEGATIVE MG/DL — SIGNIFICANT CHANGE UP
HCT VFR BLD CALC: 43.5 % — SIGNIFICANT CHANGE UP (ref 34.5–45)
HGB BLD-MCNC: 14.4 G/DL — SIGNIFICANT CHANGE UP (ref 11.5–15.5)
IMM GRANULOCYTES NFR BLD AUTO: 0.5 % — SIGNIFICANT CHANGE UP (ref 0–0.9)
INR BLD: 1.1 RATIO — SIGNIFICANT CHANGE UP (ref 0.85–1.18)
KETONES UR-MCNC: ABNORMAL MG/DL
LEUKOCYTE ESTERASE UR-ACNC: ABNORMAL
LYMPHOCYTES # BLD AUTO: 1.88 K/UL — SIGNIFICANT CHANGE UP (ref 1–3.3)
LYMPHOCYTES # BLD AUTO: 17.8 % — SIGNIFICANT CHANGE UP (ref 13–44)
MCHC RBC-ENTMCNC: 32.7 PG — SIGNIFICANT CHANGE UP (ref 27–34)
MCHC RBC-ENTMCNC: 33.1 GM/DL — SIGNIFICANT CHANGE UP (ref 32–36)
MCV RBC AUTO: 98.6 FL — SIGNIFICANT CHANGE UP (ref 80–100)
MONOCYTES # BLD AUTO: 0.71 K/UL — SIGNIFICANT CHANGE UP (ref 0–0.9)
MONOCYTES NFR BLD AUTO: 6.7 % — SIGNIFICANT CHANGE UP (ref 2–14)
NEUTROPHILS # BLD AUTO: 7.79 K/UL — HIGH (ref 1.8–7.4)
NEUTROPHILS NFR BLD AUTO: 73.8 % — SIGNIFICANT CHANGE UP (ref 43–77)
NITRITE UR-MCNC: NEGATIVE — SIGNIFICANT CHANGE UP
NRBC # BLD: 0 /100 WBCS — SIGNIFICANT CHANGE UP (ref 0–0)
PH UR: 5.5 — SIGNIFICANT CHANGE UP (ref 5–8)
PLATELET # BLD AUTO: 231 K/UL — SIGNIFICANT CHANGE UP (ref 150–400)
POTASSIUM SERPL-MCNC: 3.6 MMOL/L — SIGNIFICANT CHANGE UP (ref 3.5–5.3)
POTASSIUM SERPL-SCNC: 3.6 MMOL/L — SIGNIFICANT CHANGE UP (ref 3.5–5.3)
PROT SERPL-MCNC: 8.6 G/DL — HIGH (ref 6–8.3)
PROT UR-MCNC: NEGATIVE MG/DL — SIGNIFICANT CHANGE UP
PROTHROM AB SERPL-ACNC: 12.8 SEC — SIGNIFICANT CHANGE UP (ref 9.5–13)
RBC # BLD: 4.41 M/UL — SIGNIFICANT CHANGE UP (ref 3.8–5.2)
RBC # FLD: 12.6 % — SIGNIFICANT CHANGE UP (ref 10.3–14.5)
RBC CASTS # UR COMP ASSIST: 0 /HPF — SIGNIFICANT CHANGE UP (ref 0–4)
RSV RNA NPH QL NAA+NON-PROBE: SIGNIFICANT CHANGE UP
SARS-COV-2 RNA SPEC QL NAA+PROBE: SIGNIFICANT CHANGE UP
SODIUM SERPL-SCNC: 150 MMOL/L — HIGH (ref 135–145)
SP GR SPEC: 1.02 — SIGNIFICANT CHANGE UP (ref 1–1.03)
UROBILINOGEN FLD QL: 1 MG/DL — SIGNIFICANT CHANGE UP (ref 0.2–1)
WBC # BLD: 10.55 K/UL — HIGH (ref 3.8–10.5)
WBC # FLD AUTO: 10.55 K/UL — HIGH (ref 3.8–10.5)
WBC UR QL: 1 /HPF — SIGNIFICANT CHANGE UP (ref 0–5)

## 2023-09-05 PROCEDURE — 87086 URINE CULTURE/COLONY COUNT: CPT

## 2023-09-05 PROCEDURE — 72125 CT NECK SPINE W/O DYE: CPT | Mod: 26,MA

## 2023-09-05 PROCEDURE — 87637 SARSCOV2&INF A&B&RSV AMP PRB: CPT

## 2023-09-05 PROCEDURE — 36415 COLL VENOUS BLD VENIPUNCTURE: CPT

## 2023-09-05 PROCEDURE — 85025 COMPLETE CBC W/AUTO DIFF WBC: CPT

## 2023-09-05 PROCEDURE — 87186 SC STD MICRODIL/AGAR DIL: CPT

## 2023-09-05 PROCEDURE — 12051 INTMD RPR FACE/MM 2.5 CM/<: CPT

## 2023-09-05 PROCEDURE — 93005 ELECTROCARDIOGRAM TRACING: CPT

## 2023-09-05 PROCEDURE — 71045 X-RAY EXAM CHEST 1 VIEW: CPT | Mod: 26

## 2023-09-05 PROCEDURE — 81001 URINALYSIS AUTO W/SCOPE: CPT

## 2023-09-05 PROCEDURE — 85610 PROTHROMBIN TIME: CPT

## 2023-09-05 PROCEDURE — 70450 CT HEAD/BRAIN W/O DYE: CPT | Mod: 26,MA

## 2023-09-05 PROCEDURE — 87077 CULTURE AEROBIC IDENTIFY: CPT

## 2023-09-05 PROCEDURE — 72125 CT NECK SPINE W/O DYE: CPT | Mod: MA

## 2023-09-05 PROCEDURE — 71045 X-RAY EXAM CHEST 1 VIEW: CPT

## 2023-09-05 PROCEDURE — 85730 THROMBOPLASTIN TIME PARTIAL: CPT

## 2023-09-05 PROCEDURE — 70498 CT ANGIOGRAPHY NECK: CPT | Mod: MA

## 2023-09-05 PROCEDURE — 87040 BLOOD CULTURE FOR BACTERIA: CPT

## 2023-09-05 PROCEDURE — 99285 EMERGENCY DEPT VISIT HI MDM: CPT | Mod: 25

## 2023-09-05 PROCEDURE — 70450 CT HEAD/BRAIN W/O DYE: CPT | Mod: MA

## 2023-09-05 PROCEDURE — 93010 ELECTROCARDIOGRAM REPORT: CPT

## 2023-09-05 PROCEDURE — 80053 COMPREHEN METABOLIC PANEL: CPT

## 2023-09-05 PROCEDURE — 70498 CT ANGIOGRAPHY NECK: CPT | Mod: 26,MA

## 2023-09-05 RX ORDER — SODIUM CHLORIDE 9 MG/ML
1700 INJECTION INTRAMUSCULAR; INTRAVENOUS; SUBCUTANEOUS ONCE
Refills: 0 | Status: COMPLETED | OUTPATIENT
Start: 2023-09-05 | End: 2023-09-05

## 2023-09-05 RX ADMIN — SODIUM CHLORIDE 1700 MILLILITER(S): 9 INJECTION INTRAMUSCULAR; INTRAVENOUS; SUBCUTANEOUS at 10:49

## 2023-09-05 NOTE — ED PROVIDER NOTE - SKIN, MLM
Skin normal color for race, warm, dry and laceration to face and contusion to knees No evidence of rash.

## 2023-09-05 NOTE — ED PROVIDER NOTE - NSICDXPASTMEDICALHX_GEN_ALL_CORE_FT
PAST MEDICAL HISTORY:  Age Related Osteoporosis     Fungal infection left foot ; tx with cream ; x 3 weeks ; pt reports improvement    Heart murmur     History of Hypertension     [place] (hard of hearing)     MVP (mitral valve prolapse)     Otosclerosis

## 2023-09-05 NOTE — ED PROVIDER NOTE - CLINICAL SUMMARY MEDICAL DECISION MAKING FREE TEXT BOX
Patient is a 89-year-old female who has a history of dementia unwitnessed fall according to his daughter was not feeling well yesterday found to be hypoxic by EMS this morning on the floor with a laceration to the left side of her face. Plan of care includes CT imaging to rule out fracture or bleed, laboratory studies COVID testing given the endemic nature of COVID at this time.  Rule out flu.  Cardiac monitor.  Chest x-ray, laceration repair, disposition accordingly.  This chart was made with dictation software and may contain typographical errors.

## 2023-09-05 NOTE — ED PROVIDER NOTE - NSFOLLOWUPINSTRUCTIONS_ED_ALL_ED_FT
sutures are dissolvable do not pick or pull at them  they should fall out in 7 days on the skin the deep sutures will dissolve  ice pack to face/head to help with swelling  tylenol for pain  follow up with your primary care physician  you have old fractures on your spine  Facial Laceration  A facial laceration is a cut on the face. You may need to see a doctor for treatment.    Treatment may help the wound heal and prevent scars.    What are the causes?  A car crash.  An injury when playing sports.  An attack by a person or animal.  A fall.  What are the signs or symptoms?  A cut on the face.  Bleeding.  Pain.  Swelling.  Bruises.  How is this treated?  Your wound will be cleaned. This will help prevent infection.  Your wound may be closed. Your doctor will use stitches, skin glue, or skin tape strips to do this.  You may also be given medicines, such as:  Medicines for pain.  Medicines to prevent or treat infection (antibiotics). This might be pills or an ointment.  A tetanus shot.  Follow these instructions at home:  Follow your doctor's instructions. Ask your doctor if you have problems or questions. Caring for your wound depends on how it was closed.    If you have a bandage:    Wash your hands with soap and water for at least 20 seconds before and after you change your bandage. If you cannot use soap and water, use hand .  Change your bandage.  If stitches were used:      Keep the wound clean and dry.  If you were given a bandage, change it at least one time a day, or as told by your doctor. Also change the bandage if it gets wet or dirty.  Wash the wound with soap and water two times a day, or as told by your doctor. Rinse off the soap with water. Use a clean towel to pat the wound dry.  After cleaning, put a thin layer of antibiotic ointment on the wound as told by your doctor. This helps prevent infection and keeps the bandage from sticking to the wound.  You may shower after the first 24 hours. Do not soak the wound until the stitches are taken out.  Go back to have your stitches taken out as told by your doctor.  Do not wear makeup around the wound until your doctor says it is okay.  If skin glue was used:      You may only wet your wound in the shower or bath very briefly.  After you shower or take a bath, use a clean towel to gently pat the wound dry.  Do not soak or scrub the wound.  Do not swim.  Do not do anything that makes you sweat a lot until the skin glue has fallen off on its own.  Do not put medicines, creams, ointment, or makeup on your wound while the skin glue is in place. This may loosen the glue before your wound is healed.  Do not put tape over the skin glue if you have a bandage. This may pull off the skin glue.  Do not spend a long time in the sun or use a tanning lamp while the skin glue is on the wound.  Do not pick at the skin glue. The skin glue usually stays in place for 5–10 days. Then, it falls off the skin.  If skin tape strips were used:      Keep the wound clean and dry.  Do not let the skin tape strips get wet.  Take care to keep the wound and skin tape strips dry when you take a bath. If the wound gets wet, pat it dry with a clean towel right away.  Skin tape strips fall off on their own over time. You may trim the strips as the wound heals. Do not take off skin tape strips that are still stuck to the wound.  General instructions    Check your wound area every day for signs of infection. Check for:  More redness, swelling, or pain.  Fluid or blood.  Warmth.  Pus or a bad smell.  Take over-the-counter and prescription medicines only as told by your doctor.  If you were prescribed an antibiotic medicine, use it as told by your doctor. Do not stop using it even if you start to feel better.  After the cut has healed, put sunscreen on the area to prevent scars. It can take a year or two for redness and scars to fade.  Contact a doctor if:  You have a fever.  You have any of these signs of infection in or around your wound:  More redness, swelling, or pain.  Fluid or blood.  Warmth.  Pus or a bad smell.  Get help right away if:  You have a red streak going away from your wound.  Summary  A cut on the face may need to be closed with stitches, skin glue, or skin tape strips.  Follow your doctor's instructions for wound care.  Check your wound area every day for signs of infection, such as more redness, swelling, or pain.  This information is not intended to replace advice given to you by your health care provider. Make sure you discuss any questions you have with your health care provider.

## 2023-09-05 NOTE — ED PROVIDER NOTE - NS_EDPROVIDERDISPOUSERTYPE_ED_A_ED
Conjuntivae and eyelids appear normal, Sclerae : White without injection
Attending Attestation (For Attendings USE Only)...

## 2023-09-05 NOTE — ED ADULT NURSE NOTE - OBJECTIVE STATEMENT
Pt received in bed alert and confuse. A&OX0. Pt was brought to the ED due to unwitnessed fall. Pt sustained lac to left brow that sutured by DR Galaviz.  Pt also has hematoma left cheek.  Pt also hypoxic in the 80's and pt placed on 2L NC and tolerating well.  As per MD's orders IV urbano placed blood specimen obtained and sent to the lab. Pt stable and nursing care ongoing and safety maintained.

## 2023-09-05 NOTE — ED PROVIDER NOTE - OBJECTIVE STATEMENT
Patient is an 89-year-old female who was brought in by ambulance from her nursing home.  She has a history of dementia and is not able to provide a history.  According to her discussion with her daughter held by the ED nurse patient was not feeling well yesterday and was more confused than usual.  Found this morning to be on the floor with a facial laceration, EMS reports patient was hypoxic on evaluation.  Patient denies any cough or shortness of breath or chest pain.  She denies any neck or back pain.  She denies any pain in her hips knees or shoulders.  But patient is an unreliable historian.

## 2023-09-05 NOTE — ED PROVIDER NOTE - ENMT, MLM
Airway patent, Nasal mucosa clear. Mouth with normal mucosa. Throat has no vesicles, no oropharyngeal exudates and uvula is midline. bitemporal wasting and a left upper lid/brow 2.5 cm laceration hearing aides present left ear

## 2023-09-05 NOTE — ED PROVIDER NOTE - PATIENT PORTAL LINK FT
You can access the FollowMyHealth Patient Portal offered by [facility] by registering at the following website: [email or web address]. By joining [employer]’s FollowMyHealth portal, you will also be able to view your health information using other applications (apps) compatible with our system.

## 2023-09-05 NOTE — ED ADULT TRIAGE NOTE - CHIEF COMPLAINT QUOTE
Patient ADAM from [place] at [place] for unwitnessed fall. Unknown LOC, hx of dementia. No AC therapy. Sustained laceration to left eyebrow, dressing applied by EMS. Was hypoxic on room air per EMS to mid 80s, 2L NC improved to 94%.

## 2023-09-05 NOTE — ED ADULT NURSE NOTE - NSFALLHARMRISKINTERV_ED_ALL_ED
Assistance OOB with selected safe patient handling equipment if applicable/Assistance with ambulation/Communicate risk of Fall with Harm to all staff, patient, and family/Monitor gait and stability/Monitor for mental status changes and reorient to person, place, and time, as needed/Move patient closer to nursing station/within visual sight of ED staff/Provide visual cue: red socks, yellow wristband, yellow gown, etc/Reinforce activity limits and safety measures with patient and family/Toileting schedule using arm’s reach rule for commode and bathroom/Use of alarms - bed, stretcher, chair and/or video monitoring/Bed in lowest position, wheels locked, appropriate side rails in place/Call bell, personal items and telephone in reach/Instruct patient to call for assistance before getting out of bed/chair/stretcher/Non-slip footwear applied when patient is off stretcher/[place] to call system/Physically safe environment - no spills, clutter or unnecessary equipment/Purposeful Proactive Rounding/Room/bathroom lighting operational, light cord in reach

## 2023-09-05 NOTE — ED ADULT NURSE NOTE - CHIEF COMPLAINT QUOTE
Patient ADAM from [place] at [place] for unwitnessed fall. Unknown LOC, hx of dementia. No AC therapy. Sustained laceration to left eyebrow, dressing applied by EMS. Was hypoxic on room air per EMS to mid 80s, 2L NC improved to 94%.

## 2023-09-05 NOTE — ED PROVIDER NOTE - PROGRESS NOTE DETAILS
pt is orthostatic and iv fluids are slowly infusing, per ed rn.    will rpt orthostaic vs after second liter of ns is complete  pt otherwise remains stable. I had an at length discussion with the radiologist who is recc cta neck on the chance the patient may  have fragments of the prior neck fractures move and then move back.  if there is bony movement this puts vasculature at risk. no need for emergent trauma and immobilization at this time. no bleeding or soft tissue edema  will order cta neck pt is orthostatic and iv fluids are slowly infusing, per ed rn.    will rpt orthostatic vs after second liter of ns is complete  pt otherwise remains stable.

## 2023-09-05 NOTE — ED PROVIDER NOTE - CARE PLAN
Principal Discharge DX:	Closed head injury  Secondary Diagnosis:	Facial laceration, initial encounter  Secondary Diagnosis:	Chronic vertebral fracture due to osteoporosis   1

## 2023-09-05 NOTE — ED PROVIDER NOTE - MUSCULOSKELETAL, MLM
Spine appears normal p thas kyphotic c spine arthritic knees contusion to knees full passive rom has generalized weakness of both legs

## 2023-09-05 NOTE — PHYSICAL THERAPY INITIAL EVALUATION ADULT - HEALTH SCREEN CRITERIA
Subjective   Patient ID: Jj is a 53 year old male.      I  Reviewed prior clinic notes, consultants notes, ER notes, imaging and laboratory - 10  minutes    Chief Complaint   Patient presents with   • Physical     Patient here  for physical patient stated he has been having back sharp pain      Physical, multiple questions    Shingles Vaccine(1 of 2) Never done  COVID-19 Vaccine(4 - Pfizer series) due on 06/06/2022  Influenza Vaccine(1) due on 09/01/2023  Depression Screening due on 08/17/2024  DTaP/Tdap/Td Vaccine(2 - Td or Tdap) due on 06/27/2027  Colorectal Cancer Screen- due on 02/13/2030  Hepatitis B Vaccine Completed  Pneumococcal Vaccine 0-64 Completed  Meningococcal Vaccine Aged Out  HPV Vaccine Aged Out    Nocturia  4-5 times/night, disrupting his sleep  No hematuria  PSDA    Back pain  2 weeks ago after used an elliptical   Got worse after seeing a chiropractor \"after certain movements he made\"  Using tylenol and bio frizz  Stomach problems with naporoxen  XR back  Referral sports medicine  Rx Tizanidine and celebrex    abd pain and bloating  constipation  sxs getting better  Recommend High fiber diet (30 g of  fiber daily) that is rich in whole grain oats, raw leafy greens, fruits and vegetables  Benefiber 1 tablespoon with 8 ounces of water twice a day  Increase water intake to at least 64 ounces water daily        Patient's medications, allergies, past medical, surgical, social and family histories were reviewed and updated as appropriate.    Review of Systems   Constitutional: Negative for chills, fatigue, fever and unexpected weight change.   HENT: Negative for congestion, mouth sores, postnasal drip, sinus pressure, sinus pain and sore throat.    Eyes: Negative for pain, discharge, redness and itching.   Respiratory: Negative for cough, shortness of breath and wheezing.    Cardiovascular: Negative for chest pain, palpitations and leg swelling.   Gastrointestinal: Positive for abdominal  distention and constipation. Negative for abdominal pain, blood in stool, diarrhea, nausea and vomiting.   Endocrine: Negative for cold intolerance and heat intolerance.   Genitourinary: Negative for dysuria and hematuria.        Nocturia     Musculoskeletal: Positive for arthralgias and back pain.   Skin: Negative for rash.   Neurological: Negative for tremors, seizures, syncope and headaches.   Psychiatric/Behavioral: Negative for behavioral problems, self-injury and suicidal ideas. The patient is not nervous/anxious.        Current Outpatient Medications   Medication Sig   • celecoxib (CeleBREX) 100 MG capsule Take 1 capsule by mouth daily.   • omeprazole (PrilOSEC) 20 MG capsule Take 1 capsule by mouth daily.   • cloNIDine (CATAPRES-TTS 1) 0.1 MG/24HR Place 1 patch onto the skin 1 day a week. Box includes patch and non-medicated adhesive cover. Apply adhesive cover if patch begins to lift.   • hydroCHLOROthiazide (HYDRODIURIL) 25 MG tablet Take 1 tablet by mouth daily.   • tiZANidine (ZANAFLEX) 2 MG tablet Take 1 tablet by mouth every 12 hours as needed for Muscle spasms.   • ALPRAZolam (XANAX) 1 MG tablet Take 1 tablet by mouth 2 times daily as needed for Sleep or Anxiety.   • albuterol 108 (90 Base) MCG/ACT inhaler Inhale 2 puffs into the lungs every 4 hours as needed for Shortness of Breath or Wheezing.   • BinaxNOW COVID-19 Ag Home Test Kit TEST AS DIRECTED TODAY   • triamcinolone (ARISTOCORT) 0.1 % cream Apply 1 application topically 2 times daily.     No current facility-administered medications for this visit.       Objective   Visit Vitals  /88 (BP Location: LUE - Left upper extremity, Patient Position: Sitting, Cuff Size: Regular)   Pulse 77   Temp 97.8 °F (36.6 °C) (Tympanic)   Resp 19   Ht 5' 9\" (1.753 m)   Wt 107.3 kg (236 lb 8.9 oz)   SpO2 98%   BMI 34.93 kg/m²     Physical Exam  Vitals and nursing note reviewed.   HENT:      Head: Normocephalic.   Eyes:      Conjunctiva/sclera: Conjunctivae  normal.   Pulmonary:      Effort: Pulmonary effort is normal.   Musculoskeletal:      Comments: Walks with cane  Limps L knee pain   Neurological:      Mental Status: He is alert.   Psychiatric:         Mood and Affect: Mood is anxious.         Speech: Speech is tangential.         Lab Results Reviewed, Diagnostic Data Reviewed and   Office Visit on 08/17/2023   Component Date Value Ref Range Status   • GRP A STREP 08/17/2023 Negative  Negative Final   • Internal Procedural Controls Accep* 08/17/2023 Yes  Yes Final   • TEST LOT NUMBER 08/17/2023 [id number]   Final   • TEST LOT EXPIRATION DATE 08/17/2023 11/14/2024   Final   • POCT SARS-COV-2 ANTIGEN 08/17/2023 Not Detected  Not Detected Final   • Rapid Influenza A Ag 08/17/2023 Negative  Negative, Indeterminate Final   • Rapid Influenza B Ag 08/17/2023 Negative  Negative, Indeterminate Final   • TEST LOT NUMBER 08/17/2023 [id number]   Final   • TEST LOT EXPIRATION DATE 08/17/2023 11-   Final        Patient Active Problem List   Diagnosis   • Chronic pain   • JAVI (generalized anxiety disorder)   • Mild intermittent asthma   • Primary hypertension   • H/O opioid abuse (CMD)   • Libido, decreased   • Viral upper respiratory tract infection   • Chronic right shoulder pain   • Other chronic sinusitis   • Acute cystitis without hematuria       Assessment     Multiple differential diagnoses were considered. The patient / caregivers were apprised of diagnostic / treatment options including alternate modes of care, in addition to the risks and benefits, for this medical condition. Based on this discussion the patient / caregiver agrees with this chosen diagnostic and treatment plan.    Problem List Items Addressed This Visit        Cardiac and Vasculature    Primary hypertension    Relevant Medications    cloNIDine (CATAPRES-TTS 1) 0.1 MG/24HR    hydroCHLOROthiazide (HYDRODIURIL) 25 MG tablet   Other Visit Diagnoses     Annual visit for general adult medical examination with  abnormal findings    -  Primary    Muscle strain        Relevant Medications    celecoxib (CeleBREX) 100 MG capsule    tiZANidine (ZANAFLEX) 2 MG tablet    Prostate cancer screening        Relevant Orders    PSA    Midline low back pain with left-sided sciatica, unspecified chronicity        Relevant Orders    XRAY LUMBAR SPINE 2-3V    Counseling, unspecified        Lipid screening        Relevant Orders    Lipid Panel With Reflex    Screening for diabetes mellitus (DM)        Relevant Orders    Basic Metabolic Panel          Patient left the office in no acute distress and understanding the plan of care. All questions were answered to satisfaction. Patient was encouraged to call, or message directly though the patient portal with any questions or concerns, and return to the clinic as needed.  If patient's symptoms worsen or become life threatening, patient must seek immediate assistance by calling 911 or going to the ER for evaluation or seeking an appointment with another associate in a sister clinic or an urgent care.    • Instructions provided as documented in the Visit Summary.  • Medical compliance with plan discussed and risk of noncompliance reviewed.  • Patient/caregiver education completed on disease process, etiology and prognosis.  • Return to clinic as clinically indicated was discussed with patient/caregiver who verbalized understanding of and agreement with the plan.  • Proper usage and all side effects of medications reviewed with patient/caregiver who expressed understanding.   • Doctor callback times vary based on call volumes; please be aware the return phone call may come from an unidentified phone number.   • If your symptoms worsen or become life threatening, you should seek immediate assistance by calling 911 or going to the ER for evaluation or seeking an appointment with another associate in a sister clinic or an urgent care.      Total time spent caring for this patient on the day of the  encounter was 30 minutes. This does not include physical/Preventive/MWV/Preoperatory or Transitional OV  Total time spent on the date of patient's encounter included:  · time spent before the visit reviewing the patient’s current medical record (recent visits including specialist consultations, labs, and studies)  · time spent before the visit reviewing the patient’s outside medical records through [facility] and reconciling outside pharmacy records  · seeing the patient in the office (face-to-face time)  · counseling patient regarding problems assessed and addressed today and answering multiple questions   · time spent documenting this visit        Schedule follow up: in 2 months    Patient Privacy Notice      The 21st Century Cures Act makes medical notes like these available to patients in the interest of transparency. Please be advised that this is a medical document. Medical documents are intended to carry relevant information and the clinical opinion of the practitioner. The medical note is intended as medical provider to provider communication, and may appear blunt or direct. It is written in medical language, and may contain abbreviations or verbiage that are unfamiliar.   yes

## 2023-09-10 LAB
CULTURE RESULTS: SIGNIFICANT CHANGE UP
CULTURE RESULTS: SIGNIFICANT CHANGE UP
SPECIMEN SOURCE: SIGNIFICANT CHANGE UP
SPECIMEN SOURCE: SIGNIFICANT CHANGE UP

## 2023-09-14 ENCOUNTER — INPATIENT (INPATIENT)
Facility: HOSPITAL | Age: 88
LOS: 6 days | Discharge: NURSING, REHAB OR LONG-TERM CARE | DRG: 871 | End: 2023-09-21
Attending: FAMILY MEDICINE | Admitting: FAMILY MEDICINE
Payer: MEDICARE

## 2023-09-14 VITALS — TEMPERATURE: 98 F | HEIGHT: 62 IN | WEIGHT: 126.1 LBS

## 2023-09-14 DIAGNOSIS — G93.40 ENCEPHALOPATHY, UNSPECIFIED: ICD-10-CM

## 2023-09-14 DIAGNOSIS — F03.90 UNSPECIFIED DEMENTIA WITHOUT BEHAVIORAL DISTURBANCE: ICD-10-CM

## 2023-09-14 DIAGNOSIS — Z29.9 ENCOUNTER FOR PROPHYLACTIC MEASURES, UNSPECIFIED: ICD-10-CM

## 2023-09-14 DIAGNOSIS — I10 ESSENTIAL (PRIMARY) HYPERTENSION: ICD-10-CM

## 2023-09-14 DIAGNOSIS — N17.9 ACUTE KIDNEY FAILURE, UNSPECIFIED: ICD-10-CM

## 2023-09-14 DIAGNOSIS — M79.89 OTHER SPECIFIED SOFT TISSUE DISORDERS: ICD-10-CM

## 2023-09-14 DIAGNOSIS — E87.0 HYPEROSMOLALITY AND HYPERNATREMIA: ICD-10-CM

## 2023-09-14 DIAGNOSIS — K52.9 NONINFECTIVE GASTROENTERITIS AND COLITIS, UNSPECIFIED: ICD-10-CM

## 2023-09-14 LAB
ALBUMIN SERPL ELPH-MCNC: 3.1 G/DL — LOW (ref 3.3–5)
ALP SERPL-CCNC: 79 U/L — SIGNIFICANT CHANGE UP (ref 40–120)
ALT FLD-CCNC: 27 U/L — SIGNIFICANT CHANGE UP (ref 12–78)
ANION GAP SERPL CALC-SCNC: 4 MMOL/L — LOW (ref 5–17)
APPEARANCE UR: CLEAR — SIGNIFICANT CHANGE UP
APTT BLD: 26.1 SEC — SIGNIFICANT CHANGE UP (ref 24.5–35.6)
AST SERPL-CCNC: 24 U/L — SIGNIFICANT CHANGE UP (ref 15–37)
BASOPHILS # BLD AUTO: 0.04 K/UL — SIGNIFICANT CHANGE UP (ref 0–0.2)
BASOPHILS NFR BLD AUTO: 0.3 % — SIGNIFICANT CHANGE UP (ref 0–2)
BILIRUB SERPL-MCNC: 0.8 MG/DL — SIGNIFICANT CHANGE UP (ref 0.2–1.2)
BILIRUB UR-MCNC: NEGATIVE — SIGNIFICANT CHANGE UP
BUN SERPL-MCNC: 43 MG/DL — HIGH (ref 7–23)
CALCIUM SERPL-MCNC: 12.7 MG/DL — HIGH (ref 8.5–10.1)
CHLORIDE SERPL-SCNC: 123 MMOL/L — HIGH (ref 96–108)
CO2 SERPL-SCNC: 29 MMOL/L — SIGNIFICANT CHANGE UP (ref 22–31)
COLOR SPEC: YELLOW — SIGNIFICANT CHANGE UP
CREAT SERPL-MCNC: 1.7 MG/DL — HIGH (ref 0.5–1.3)
DIFF PNL FLD: NEGATIVE — SIGNIFICANT CHANGE UP
EGFR: 28 ML/MIN/1.73M2 — LOW
EOSINOPHIL # BLD AUTO: 0.15 K/UL — SIGNIFICANT CHANGE UP (ref 0–0.5)
EOSINOPHIL NFR BLD AUTO: 1.2 % — SIGNIFICANT CHANGE UP (ref 0–6)
FLUAV AG NPH QL: SIGNIFICANT CHANGE UP
FLUBV AG NPH QL: SIGNIFICANT CHANGE UP
GLUCOSE SERPL-MCNC: 106 MG/DL — HIGH (ref 70–99)
GLUCOSE UR QL: NEGATIVE MG/DL — SIGNIFICANT CHANGE UP
HCT VFR BLD CALC: 43.5 % — SIGNIFICANT CHANGE UP (ref 34.5–45)
HGB BLD-MCNC: 14.2 G/DL — SIGNIFICANT CHANGE UP (ref 11.5–15.5)
IMM GRANULOCYTES NFR BLD AUTO: 0.3 % — SIGNIFICANT CHANGE UP (ref 0–0.9)
INR BLD: 1.08 RATIO — SIGNIFICANT CHANGE UP (ref 0.85–1.18)
KETONES UR-MCNC: NEGATIVE MG/DL — SIGNIFICANT CHANGE UP
LACTATE SERPL-SCNC: 1.4 MMOL/L — SIGNIFICANT CHANGE UP (ref 0.7–2)
LEUKOCYTE ESTERASE UR-ACNC: ABNORMAL
LYMPHOCYTES # BLD AUTO: 1.69 K/UL — SIGNIFICANT CHANGE UP (ref 1–3.3)
LYMPHOCYTES # BLD AUTO: 13.9 % — SIGNIFICANT CHANGE UP (ref 13–44)
MCHC RBC-ENTMCNC: 32.6 GM/DL — SIGNIFICANT CHANGE UP (ref 32–36)
MCHC RBC-ENTMCNC: 32.8 PG — SIGNIFICANT CHANGE UP (ref 27–34)
MCV RBC AUTO: 100.5 FL — HIGH (ref 80–100)
MONOCYTES # BLD AUTO: 0.94 K/UL — HIGH (ref 0–0.9)
MONOCYTES NFR BLD AUTO: 7.7 % — SIGNIFICANT CHANGE UP (ref 2–14)
NEUTROPHILS # BLD AUTO: 9.31 K/UL — HIGH (ref 1.8–7.4)
NEUTROPHILS NFR BLD AUTO: 76.6 % — SIGNIFICANT CHANGE UP (ref 43–77)
NITRITE UR-MCNC: NEGATIVE — SIGNIFICANT CHANGE UP
NRBC # BLD: 0 /100 WBCS — SIGNIFICANT CHANGE UP (ref 0–0)
PH UR: 6 — SIGNIFICANT CHANGE UP (ref 5–8)
PLATELET # BLD AUTO: 152 K/UL — SIGNIFICANT CHANGE UP (ref 150–400)
POTASSIUM SERPL-MCNC: 3.7 MMOL/L — SIGNIFICANT CHANGE UP (ref 3.5–5.3)
POTASSIUM SERPL-SCNC: 3.7 MMOL/L — SIGNIFICANT CHANGE UP (ref 3.5–5.3)
PROT SERPL-MCNC: 7.6 G/DL — SIGNIFICANT CHANGE UP (ref 6–8.3)
PROT UR-MCNC: NEGATIVE MG/DL — SIGNIFICANT CHANGE UP
PROTHROM AB SERPL-ACNC: 12.6 SEC — SIGNIFICANT CHANGE UP (ref 9.5–13)
RBC # BLD: 4.33 M/UL — SIGNIFICANT CHANGE UP (ref 3.8–5.2)
RBC # FLD: 13 % — SIGNIFICANT CHANGE UP (ref 10.3–14.5)
RSV RNA NPH QL NAA+NON-PROBE: SIGNIFICANT CHANGE UP
SARS-COV-2 RNA SPEC QL NAA+PROBE: SIGNIFICANT CHANGE UP
SODIUM SERPL-SCNC: 156 MMOL/L — HIGH (ref 135–145)
SP GR SPEC: 1.01 — SIGNIFICANT CHANGE UP (ref 1–1.03)
UROBILINOGEN FLD QL: 0.2 MG/DL — SIGNIFICANT CHANGE UP (ref 0.2–1)
WBC # BLD: 12.17 K/UL — HIGH (ref 3.8–10.5)
WBC # FLD AUTO: 12.17 K/UL — HIGH (ref 3.8–10.5)

## 2023-09-14 PROCEDURE — 99497 ADVNCD CARE PLAN 30 MIN: CPT | Mod: GC,25

## 2023-09-14 PROCEDURE — 71045 X-RAY EXAM CHEST 1 VIEW: CPT | Mod: 26

## 2023-09-14 PROCEDURE — 99285 EMERGENCY DEPT VISIT HI MDM: CPT

## 2023-09-14 PROCEDURE — 93010 ELECTROCARDIOGRAM REPORT: CPT

## 2023-09-14 PROCEDURE — 99223 1ST HOSP IP/OBS HIGH 75: CPT | Mod: GC

## 2023-09-14 PROCEDURE — 74176 CT ABD & PELVIS W/O CONTRAST: CPT | Mod: 26,MA

## 2023-09-14 PROCEDURE — 71250 CT THORAX DX C-: CPT | Mod: 26,MA

## 2023-09-14 PROCEDURE — 70450 CT HEAD/BRAIN W/O DYE: CPT | Mod: 26,MA

## 2023-09-14 RX ORDER — FUROSEMIDE 40 MG
40 TABLET ORAL DAILY
Refills: 0 | Status: DISCONTINUED | OUTPATIENT
Start: 2023-09-14 | End: 2023-09-14

## 2023-09-14 RX ORDER — SODIUM CHLORIDE 9 MG/ML
1000 INJECTION, SOLUTION INTRAVENOUS ONCE
Refills: 0 | Status: COMPLETED | OUTPATIENT
Start: 2023-09-14 | End: 2023-09-14

## 2023-09-14 RX ORDER — SODIUM CHLORIDE 9 MG/ML
1000 INJECTION, SOLUTION INTRAVENOUS
Refills: 0 | Status: DISCONTINUED | OUTPATIENT
Start: 2023-09-14 | End: 2023-09-21

## 2023-09-14 RX ORDER — POLYETHYLENE GLYCOL 3350 17 G/17G
17 POWDER, FOR SOLUTION ORAL DAILY
Refills: 0 | Status: DISCONTINUED | OUTPATIENT
Start: 2023-09-14 | End: 2023-09-21

## 2023-09-14 RX ORDER — CHOLECALCIFEROL (VITAMIN D3) 125 MCG
2000 CAPSULE ORAL DAILY
Refills: 0 | Status: DISCONTINUED | OUTPATIENT
Start: 2023-09-14 | End: 2023-09-21

## 2023-09-14 RX ORDER — LOSARTAN POTASSIUM 100 MG/1
1 TABLET, FILM COATED ORAL
Qty: 0 | Refills: 0 | DISCHARGE

## 2023-09-14 RX ORDER — HEPARIN SODIUM 5000 [USP'U]/ML
5000 INJECTION INTRAVENOUS; SUBCUTANEOUS EVERY 12 HOURS
Refills: 0 | Status: DISCONTINUED | OUTPATIENT
Start: 2023-09-14 | End: 2023-09-19

## 2023-09-14 RX ORDER — AZITHROMYCIN 500 MG/1
500 TABLET, FILM COATED ORAL ONCE
Refills: 0 | Status: COMPLETED | OUTPATIENT
Start: 2023-09-14 | End: 2023-09-14

## 2023-09-14 RX ORDER — POTASSIUM CHLORIDE 20 MEQ
20 PACKET (EA) ORAL DAILY
Refills: 0 | Status: DISCONTINUED | OUTPATIENT
Start: 2023-09-14 | End: 2023-09-21

## 2023-09-14 RX ORDER — ASPIRIN/CALCIUM CARB/MAGNESIUM 324 MG
1 TABLET ORAL
Qty: 0 | Refills: 0 | DISCHARGE

## 2023-09-14 RX ORDER — ACETAMINOPHEN 500 MG
1000 TABLET ORAL ONCE
Refills: 0 | Status: COMPLETED | OUTPATIENT
Start: 2023-09-14 | End: 2023-09-14

## 2023-09-14 RX ORDER — ACETAMINOPHEN 500 MG
650 TABLET ORAL EVERY 6 HOURS
Refills: 0 | Status: DISCONTINUED | OUTPATIENT
Start: 2023-09-14 | End: 2023-09-21

## 2023-09-14 RX ORDER — CEFTRIAXONE 500 MG/1
1000 INJECTION, POWDER, FOR SOLUTION INTRAMUSCULAR; INTRAVENOUS ONCE
Refills: 0 | Status: COMPLETED | OUTPATIENT
Start: 2023-09-14 | End: 2023-09-14

## 2023-09-14 RX ORDER — CEFTRIAXONE 500 MG/1
1000 INJECTION, POWDER, FOR SOLUTION INTRAMUSCULAR; INTRAVENOUS EVERY 24 HOURS
Refills: 0 | Status: DISCONTINUED | OUTPATIENT
Start: 2023-09-15 | End: 2023-09-18

## 2023-09-14 RX ORDER — AZITHROMYCIN 500 MG/1
500 TABLET, FILM COATED ORAL EVERY 24 HOURS
Refills: 0 | Status: DISCONTINUED | OUTPATIENT
Start: 2023-09-15 | End: 2023-09-16

## 2023-09-14 RX ORDER — RISPERIDONE 4 MG/1
0.25 TABLET ORAL
Refills: 0 | Status: DISCONTINUED | OUTPATIENT
Start: 2023-09-14 | End: 2023-09-21

## 2023-09-14 RX ADMIN — Medication 400 MILLIGRAM(S): at 18:45

## 2023-09-14 RX ADMIN — SODIUM CHLORIDE 60 MILLILITER(S): 9 INJECTION, SOLUTION INTRAVENOUS at 22:29

## 2023-09-14 RX ADMIN — SODIUM CHLORIDE 1000 MILLILITER(S): 9 INJECTION, SOLUTION INTRAVENOUS at 16:10

## 2023-09-14 RX ADMIN — SODIUM CHLORIDE 60 MILLILITER(S): 9 INJECTION, SOLUTION INTRAVENOUS at 21:57

## 2023-09-14 RX ADMIN — AZITHROMYCIN 255 MILLIGRAM(S): 500 TABLET, FILM COATED ORAL at 20:15

## 2023-09-14 RX ADMIN — CEFTRIAXONE 100 MILLIGRAM(S): 500 INJECTION, POWDER, FOR SOLUTION INTRAMUSCULAR; INTRAVENOUS at 19:19

## 2023-09-14 NOTE — PATIENT PROFILE ADULT - NSPRESCRUSEDDRG_GEN_A_NUR
Physical Therapy    Facility/Department: [facility]  Initial Assessment    NAME: Wing Marquez  : 1985  MRN: [id number]    Date of Service: 2022    Discharge Recommendations:           Assessment   Assessment: No deficits w/ NWB mobility  Prognosis: Excellent  Decision Making: Low Complexity  PT Education: PT Role;Plan of Care;General Safety;Weight-bearing Education  Patient Education: Handout: NWB R LE sit<-->stand & ambulation w/ crutches  No Skilled PT: Independent with functional mobility   REQUIRES PT FOLLOW UP: No  Activity Tolerance  Activity Tolerance: Patient Tolerated treatment well       Patient Diagnosis(es): The encounter diagnosis was Post-operative pain. has a past medical history of Anxiety, Asthma, and Depression. has a past surgical history that includes LEEP and fracture surgery.     Restrictions  Restrictions/Precautions  Restrictions/Precautions: Weight Bearing  Required Braces or Orthoses?: No  Lower Extremity Weight Bearing Restrictions  Right Lower Extremity Weight Bearing: Non Weight Bearing  Position Activity Restriction  Other position/activity restrictions: RUE IV  Vision/Hearing        Subjective  General  Chart Reviewed: Yes  Patient assessed for rehabilitation services?: Yes  Response To Previous Treatment: Not applicable  Family / Caregiver Present: No  Follows Commands: Within Functional Limits  General Comment  Comments: OK for PT per Eliel Sigala RN  Pain Screening  Patient Currently in Pain: Yes  Pain Assessment  Pain Assessment: 0-10  Pain Level: 3  Pain Type: Surgical pain  Vital Signs  Patient Currently in Pain: Yes       Orientation  Orientation  Overall Orientation Status: Within Normal Limits  Social/Functional History  Social/Functional History  Lives With: Daughter  Type of Home: Apartment  Home Layout: One level  Home Access: Ramped entrance  Bathroom Shower/Tub: Tub/Shower unit  Bathroom Toilet: Standard  Bathroom Equipment: Grab bars in shower  Home Equipment: Crutches  ADL Assistance: Independent  Homemaking Assistance: Independent  Homemaking Responsibilities: Yes  Ambulation Assistance: Independent  Transfer Assistance: Independent  Active : Yes  Occupation: Unemployed  [address]: \"not really\"  Additional Comments: Pt reports one fall after slipping on a rub getting out of the shower which resulted in ankle fracture.   Cognition   Cognition  Overall Cognitive Status: WNL    Objective          AROM RLE (degrees)  RLE General AROM: WFL hip, knee/ankle NA  AROM LLE (degrees)  LLE AROM : WNL  AROM RUE (degrees)  RUE General AROM: See OT eval for B UE ROM  Strength RLE  Comment: NA  Strength LLE  Comment: 5/5 L LE  Strength RUE  Comment: See OT eval for B UE MMT  Tone RLE  RLE Tone: Normotonic  Tone LLE  LLE Tone: Normotonic  Motor Control  Gross Motor?: WNL  Sensation  Overall Sensation Status:  (Post op R toes)  Bed mobility  Rolling to Left: Independent  Rolling to Right: Independent  Supine to Sit: Independent  Sit to Supine: Independent  Scooting: Independent  Transfers  Sit to Stand: Independent  Stand Pivot Transfers: Independent  Ambulation  Ambulation?: Yes  Ambulation 1  Surface: level tile  Device: Axillary Crutches  Assistance: Independent  Gait Deviations: None  Distance: 55' x 1  Comments: Good pattern and technique throughout  Stairs/Curb  Stairs?: No (Pt has ramp at home,no steps)     Balance  Posture: Good  Sitting - Static: Good  Sitting - Dynamic: Good  Standing - Static: Good  Standing - Dynamic: Good        Plan   Plan  Times per week: No further PT intervention required at this time  Safety Devices  Type of devices: Left in bed,Call light within reach,Gait belt  Restraints  Initially in place: No    G-Code       OutComes Score  Balance Score: 15 (04/05/22 1004)  Gait Score: 11 (04/05/22 1004)        Tinetti Total Score: 26 (04/05/22 1004)                                   AM-PAC Score  AM-PAC Inpatient Mobility Raw Score : 24 (04/05/22 1004)  AM-PAC Inpatient T-Scale Score : 61.14 (04/05/22 1004)  Mobility Inpatient CMS 0-100% Score: 0 (04/05/22 1004)  Mobility Inpatient CMS G-Code Modifier : [address] (04/05/22 1004)          Goals  Short term goals  Time Frame for Short term goals: No further PT intervention required at this time  Patient Goals   Patient goals : Home ASAP       Therapy Time   Individual Concurrent Group Co-treatment   Time In 85 99 60 (Treatment time 11 minutes)         Time Out 0842         Minutes [address], [address] [facility] [address]

## 2023-09-14 NOTE — ED ADULT NURSE NOTE - CHIEF COMPLAINT QUOTE
patient brought in from [facility]- lethargic, weak, difficult to arouse. fingerstick- 159, as per ems, patient was 80-% on room air, on non rebreather now

## 2023-09-14 NOTE — ED ADULT NURSE NOTE - NSFALLHARMRISKINTERV_ED_ALL_ED
Assistance OOB with selected safe patient handling equipment if applicable/Assistance with ambulation/Communicate risk of Fall with Harm to all staff, patient, and family/Monitor gait and stability/Provide visual cue: red socks, yellow wristband, yellow gown, etc/Reinforce activity limits and safety measures with patient and family/Bed in lowest position, wheels locked, appropriate side rails in place/Call bell, personal items and telephone in reach/Instruct patient to call for assistance before getting out of bed/chair/stretcher/Non-slip footwear applied when patient is off stretcher/[place] to call system/Physically safe environment - no spills, clutter or unnecessary equipment/Purposeful Proactive Rounding/Room/bathroom lighting operational, light cord in reach

## 2023-09-14 NOTE — H&P ADULT - NSICDXPASTMEDICALHX_GEN_ALL_CORE_FT
PAST MEDICAL HISTORY:  Age Related Osteoporosis     Anal prolapse     Fungal infection left foot ; tx with cream ; x 3 weeks ; pt reports improvement    Heart murmur     History of Hypertension     [place] (hard of hearing)     MVP (mitral valve prolapse)     Otosclerosis

## 2023-09-14 NOTE — ED PROVIDER NOTE - CLINICAL SUMMARY MEDICAL DECISION MAKING FREE TEXT BOX
Declining mental status weakness decreased p.o. intake decreased ambulation over the past few days with no definitive cause requiring thorough evaluation labs EKG imaging as well as IV fluids and medications.

## 2023-09-14 NOTE — H&P ADULT - PROBLEM SELECTOR PLAN 3
Cr 1.7 on admission   - likely pre-renal in setting of decreased PO intake/dehydration   - continue D5 60cc/hr

## 2023-09-14 NOTE — ED ADULT NURSE REASSESSMENT NOTE - NS ED NURSE REASSESS COMMENT FT1
pt cleaned, new linens and diaper changed. report given to Alyx JOYA. pt transported to [facility] via stretcher in NAD, RR even and unlabored. safety measures maintained, side rails up x2. all belongings with pt.

## 2023-09-14 NOTE — H&P ADULT - PROBLEM SELECTOR PLAN 6
Chronic  - continue home lasix 40mg qd   - follows with Dr. Ogden   - Echocardiography  9/26/2022 revealed severe left atrial enlargement the remainder of the cardiac chambers were normal in dimension. Mild concentric left ventricular hypertrophy was noted. Left ventricular systolic function was normal, with an estimated ejection fraction of 60 to 65%. Chronic  - HOLD home lasix 40mg qd in setting of hypernatremia/KEELY  - follows with Dr. Ogden   - Echocardiography  9/26/2022 revealed severe left atrial enlargement the remainder of the cardiac chambers were normal in dimension. Mild concentric left ventricular hypertrophy was noted. Left ventricular systolic function was normal, with an estimated ejection fraction of 60 to 65%.

## 2023-09-14 NOTE — ED PROVIDER NOTE - PHYSICAL EXAMINATION
Subjective     Marshall Canales is a 50 year old male who presents to clinic today for the following health issues:    HPI   Derm Problem  2 days skin problem on his tailbone.  HPI:  Dry skin on lower tailbone about 1 year ago.  This week noticed a bump on the top of the tailbone.  States will have a BM 4 times a day   Felt a bump on the and started to get inflamed again   Put caladryl on it to dry it out.   A few days later noticed discomfort, skin more bumpy and inflamed, and developed white pustular lesion.   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  Not itchy  Tender when sitting on it.   No lesions anywhere else on skin  No major changes to skin regimen; no changes on laundry detergent.  No history of allergies    2. Would like a STD check again as in a new relationship   No symptoms     3.  Has been off the lipitor for about 6 months  Was worried as LFTs were elevated on the medication  Does have a couple drinks every night a bacardi and coke       Patient Active Problem List   Diagnosis     Chronic arthritis     Encounter for long-term current use of medication     Erectile dysfunction     Long-term use of immunosuppressant medication     Hyperlipidemia LDL goal <100     Elevated BP without diagnosis of hypertension     History reviewed. No pertinent surgical history.    Social History     Tobacco Use     Smoking status: Never Smoker     Smokeless tobacco: Never Used   Substance Use Topics     Alcohol use: Yes     Alcohol/week: 0.0 standard drinks     Family History   Problem Relation Age of Onset     Hypertension Mother      Hypertension Maternal Grandmother      Hypertension Maternal Grandfather      Hypertension Paternal Grandmother      Hypertension Paternal Grandfather      Hypertension Other      Other - See Comments Other         heart attack         Current Outpatient Medications   Medication Sig Dispense Refill     cholecalciferol (VITAMIN D3) 5000 UNITS CAPS capsule Take  by mouth daily.        FISH OIL        Multiple Vitamin (MULTI VITAMIN MENS PO) Take  by mouth.       sildenafil (VIAGRA) 100 MG tablet Take 0.5-1 tablets ( mg) by mouth daily as needed for erectile dysfunction Take 30 min to 4 hours before intercourse.  Never use with nitroglycerin, terazosin or doxazosin. 12 tablet 11     atorvastatin (LIPITOR) 10 MG tablet Take 1 tablet (10 mg) by mouth daily (Patient not taking: Reported on 2/13/2020) 90 tablet 0     No Known Allergies  BP Readings from Last 3 Encounters:   02/13/20 (!) 146/89   07/08/19 136/88   04/09/19 120/80    Wt Readings from Last 3 Encounters:   02/13/20 85.2 kg (187 lb 14.4 oz)   07/08/19 83.6 kg (184 lb 6.4 oz)   04/08/19 85.3 kg (188 lb)         Reviewed and updated as needed this visit by Provider         Review of Systems   ROS COMP: CONSTITUTIONAL:NEGATIVE for fever, chills, change in weight  INTEGUMENTARY/SKIN: NEGATIVE for rash  and POSITIVE for abrased are in perirectal area   RESP:NEGATIVE for significant cough or SOB  CV: NEGATIVE for chest pain, palpitations or peripheral edema  GI: NEGATIVE for nausea, abdominal pain, heartburn, or change in bowel habits  : positive for and erectile dysfunction  MUSCULOSKELETAL: NEGATIVE for significant arthralgias or myalgia  NEURO: NEGATIVE for weakness, dizziness or paresthesias  ENDOCRINE: NEGATIVE for temperature intolerance, skin/hair changes  HEME/ALLERGY/IMMUNE: NEGATIVE for bleeding problems  PSYCHIATRIC: NEGATIVE for changes in mood or affect      Objective    BP (!) 146/89 (BP Location: Right arm, Patient Position: Sitting, Cuff Size: Adult Regular)   Pulse 106   Temp 98.8  F (37.1  C) (Temporal)   Wt 85.2 kg (187 lb 14.4 oz)   SpO2 98%   BMI 24.79 kg/m    There is no height or weight on file to calculate BMI.  Physical Exam   GENERAL APPEARANCE: alert, active and no distress  EYES: Eyes grossly normal to inspection and conjunctivae and sclerae normal  HENT: ear canals and TM's normal and nose and mouth  without ulcers or lesions  NECK: no adenopathy  RESP: lungs clear to auscultation - no rales, rhonchi or wheezes  CV: regular rates and rhythm and no murmur, click or rub  ABDOMEN: soft, non-tender   (male): abraded area in perirectal region. SKIN: Effected area of skin in red, swollen and tender.  No fluctuance.  No obvious involvement of underlying structures.  MS: extremities normal- no gross deformities noted  SKIN: Skin color, texture, turgor normal.   NEURO: Normal strength and tone, mentation intact and speech normal  PSYCH: mentation appears normal and affect normal/bright    Diagnostic Test Results:  Results for orders placed or performed in visit on 02/13/20   Treponema Abs w Reflex to RPR and Titer     Status: None   Result Value Ref Range    Treponema Antibodies Nonreactive NR^Nonreactive   Hepatitis C Screen Reflex to HCV RNA Quant and Genotype     Status: None   Result Value Ref Range    Hepatitis C Antibody Nonreactive NR^Nonreactive   HIV Antigen Antibody Combo     Status: None   Result Value Ref Range    HIV Antigen Antibody Combo Nonreactive NR^Nonreactive       Hepatic panel     Status: Abnormal   Result Value Ref Range    Bilirubin Direct 0.3 (H) 0.0 - 0.2 mg/dL    Bilirubin Total 1.4 (H) 0.2 - 1.3 mg/dL    Albumin 4.0 3.4 - 5.0 g/dL    Protein Total 7.9 6.8 - 8.8 g/dL    Alkaline Phosphatase 100 40 - 150 U/L    ALT 56 0 - 70 U/L    AST 46 (H) 0 - 45 U/L   TSH with free T4 reflex     Status: None   Result Value Ref Range    TSH 0.95 0.40 - 4.00 mU/L   Neisseria gonorrhoeae PCR     Status: None    Specimen: Urine   Result Value Ref Range    Specimen Descrip Urine     N Gonorrhea PCR Negative NEG^Negative   Chlamydia trachomatis PCR     Status: None    Specimen: Urine   Result Value Ref Range    Specimen Description Urine     Chlamydia Trachomatis PCR Negative NEG^Negative             Assessment & Plan     Marshall was seen today for derm problem.    Diagnoses and all orders for this  visit:    Local infection of skin and subcutaneous tissue  -     doxycycline hyclate (VIBRA-TABS) 100 MG tablet; Take 1 tablet (100 mg) by mouth 2 times daily   take antibiotics as directed.  If new, worsening or persistent symptoms, the patient is to call or return for a recheck.    Screen for STD (sexually transmitted disease)  -     Neisseria gonorrhoeae PCR  -     Chlamydia trachomatis PCR  -     Treponema Abs w Reflex to RPR and Titer  -     Hepatitis C Screen Reflex to HCV RNA Quant and Genotype  -     HIV Antigen Antibody Combo    Hyperlipidemia LDL goal <100  -     Lipid panel reflex to direct LDL Fasting; Future  -     Hepatic panel; Future  -     TSH with free T4 reflex; Future  -     Hepatic panel  -     TSH with free T4 reflex    Erectile dysfunction, unspecified erectile dysfunction type  start-     sildenafil (VIAGRA) 100 MG tablet; Take 0.5-1 tablets ( mg) by mouth daily as needed Take 30 min to 4 hours before intercourse.  Never use with nitroglycerin, terazosin or doxazosin.  Discussed the nature and pathophysiology of erectile dysfunction, that this is primarily physiologic and incidence is increased with age and any underlying vascular disease. Went over the use of Viagra and similar drugs for treating this disorder.  Discussed risks, benefits, side effects, and alternatives of therapy. Recheck if therapy is not effective or if side effects preclude its use.    Elevated LFTs  -     US Abdomen Complete; Future  -     Hepatic panel; Future  Will follow up and/or notify patient of  results via My Chart to determine further need for followup    See Patient Instructions  Patient Instructions     PLAN:   1.   Symptomatic therapy suggested: A high fiber diet with plenty of fluids (up to 8 glasses of water daily) is suggested to relieve these symptoms.  Metamucil, 2 capsules once  daily can be used to keep bowels regular if needed.    2.  Orders Placed This Encounter   Medications     doxycycline  Physical examination reveals a frail elderly white female who appears her stated age lying in the right lateral decubital position somewhat lethargic but easily arousable to voice with the following.  HEENT normocephalic.  There is a healing area of ecchymosis on the left side of her face in various stages of healing.  Pupils equal round reactive to light and accommodation extraocular movements intact neck is supple no C-spine tenderness no meningismus.  Lungs are clear.  Heart tachycardia at a rate of approximately 100-110 without any gallops.  Abdomen was slightly protuberant but completely soft and nontender.  Pelvis is stable hips are stable with free range of motion at both hips.  Extremities lower reveal 1+ pitting pretibial edema.  Neurologically she is slightly lethargic but easily arousable with voice commands and voice stimuli and has no focal neurologic deficits and that she is moving all 4 extremities both proximally and distally spontaneously. hyclate (VIBRA-TABS) 100 MG tablet     Sig: Take 1 tablet (100 mg) by mouth 2 times daily     Dispense:  14 tablet     Refill:  0     sildenafil (VIAGRA) 100 MG tablet     Sig: Take 0.5-1 tablets ( mg) by mouth daily as needed Take 30 min to 4 hours before intercourse.  Never use with nitroglycerin, terazosin or doxazosin.     Dispense:  12 tablet     Refill:  11     Orders Placed This Encounter   Procedures     Treponema Abs w Reflex to RPR and Titer     Hepatitis C Screen Reflex to HCV RNA Quant and Genotype     HIV Antigen Antibody Combo     Lipid panel reflex to direct LDL Fasting     Hepatic panel     TSH with free T4 reflex       3. Patient needs to follow up in if no improvement,or sooner if worsening of symptoms or other symptoms develop.  FUTURE LABS:       - Schedule a fasting blood draw   Will follow up and/or notify patient of  results via My Chart to determine further need for followup    No follow-ups on file.    AWAIS Hathaway CNP  [facility]

## 2023-09-14 NOTE — PATIENT PROFILE ADULT - CENTRAL VENOUS CATHETER/PICC LINE
Here for follow up. C/O sternal incision pain mid to lower incision. Has been treated for superfiscial infection. /80   Pulse 54   Temp 98.9 °F (37.2 °C) (Oral)   Resp 16   Ht 6' (1.829 m)   Wt 181 lb (82.1 kg)   BMI 24.55 kg/m²   Incsions clean and dry. Small area of nonhealing erosion. Palpable sternal wire. Chest intact.   No LE edema    Plan: f/u in 2-3 weeks for revaluationa nd possible sternal wire removal    Alessandra Hernandez no

## 2023-09-14 NOTE — H&P ADULT - PROBLEM SELECTOR PLAN 2
Na 156  - likely 2/2 decreased PO intake   - start D5 60cc/hr Na 156  - likely 2/2 decreased PO intake/dehydration  - start D5 60cc/hr for 6 hrs   - consider nephro consult if Na decreases too quickly or hypernatremia does not resolve

## 2023-09-14 NOTE — ED ADULT NURSE REASSESSMENT NOTE - NS ED NURSE REASSESS COMMENT FT1
report received from previous RN. abx initiated, pt tolerating well. iv flushing well without complications, site WNL. pt sleeping in stretcher in NAD, RR even and unlabored. maintained on continuous cardiac and o2 monitoring. safety measures maintained, side rails up x2.

## 2023-09-14 NOTE — H&P ADULT - CONVERSATION DETAILS
[facility] discussed with daughter and health care proxy, Elvira Chi, over the phone. States that pt is DNR/DNI. Pt brought in with a letter which details these measures; however, daughter will come in tomorrow to fill out official MOLST form. Will put in the DNR/DNI order. [facility] discussed with daughter and health care proxy, Elvira Chi, over the phone. States that pt is DNR/DNI. Pt brought in with a letter which details these measures; however, daughter will come in tomorrow to fill out official MOLST form. Will put in the DNR/DNI order.  If pt continues to decline consider palliative consult for hospice care.

## 2023-09-14 NOTE — PATIENT PROFILE ADULT - FALL HARM RISK - HARM RISK INTERVENTIONS
Assistance with ambulation/Assistance OOB with selected safe patient handling equipment/Communicate Risk of Fall with Harm to all staff/Monitor for mental status changes/Monitor gait and stability/Provide patient with walking aids - walker, cane, crutches/Reinforce activity limits and safety measures with patient and family/Reorient to person, place and time as needed/Review medications for side effects contributing to fall risk/Sit up slowly, dangle for a short time, stand at bedside before walking/Tailored Fall Risk Interventions/Use of alarms - bed, chair and/or voice tab/Visual Cue: Yellow wristband and red socks/Bed in lowest position, wheels locked, appropriate side rails in place/Call bell, personal items and telephone in reach/Instruct patient to call for assistance before getting out of bed or chair/Non-slip footwear when patient is out of bed/[place] to call system/Physically safe environment - no spills, clutter or unnecessary equipment/Purposeful Proactive Rounding/Room/bathroom lighting operational, light cord in reach Assistance with ambulation/Assistance OOB with selected safe patient handling equipment/Communicate Risk of Fall with Harm to all staff/Discuss with provider need for PT consult/Monitor gait and stability/Provide patient with walking aids - walker, cane, crutches/Reinforce activity limits and safety measures with patient and family/Tailored Fall Risk Interventions/Visual Cue: Yellow wristband and red socks/Bed in lowest position, wheels locked, appropriate side rails in place/Call bell, personal items and telephone in reach/Instruct patient to call for assistance before getting out of bed or chair/Non-slip footwear when patient is out of bed/[place] to call system/Physically safe environment - no spills, clutter or unnecessary equipment/Purposeful Proactive Rounding/Room/bathroom lighting operational, light cord in reach/Unable to comprehend

## 2023-09-14 NOTE — ED PROVIDER NOTE - NSICDXPASTMEDICALHX_GEN_ALL_CORE_FT
PAST MEDICAL HISTORY:  Age Related Osteoporosis     Fungal infection left foot ; tx with cream ; x 3 weeks ; pt reports improvement    Heart murmur     History of Hypertension     [place] (hard of hearing)     MVP (mitral valve prolapse)     Otosclerosis

## 2023-09-14 NOTE — H&P ADULT - NSHPSOCIALHISTORY_GEN_ALL_CORE
Tobacco: None  EtOH:  None  Recreational drug use: None  Lives with: Bristal   Ambulates: Walker   ADLs: dependent

## 2023-09-14 NOTE — ED PROVIDER NOTE - OBJECTIVE STATEMENT
Patient is a 89-year-old female with history of hypertension mitral valve prolapse osteoporosis dementia coronary artery disease diverticulosis hyperlipidemia hyperparathyroidism who was sent to the emergency department at [facility] today for questionable declining mental status and elevated heart rate today.  According to aide who knows patient well over the past few days she has been eating less feeling little weak much less active and her mental acuity seems to be worse than her baseline.  As far she is aware no recent falls.  There has been no documented fevers vomiting cough or shortness of breath. Of note patient presently is in the dementia unit at the [facility].

## 2023-09-14 NOTE — H&P ADULT - ASSESSMENT
89-year-old female with history of hypertension, mitral valve prolapse, osteoporosis, dementia, coronary artery disease, diverticulosis, hyperlipidemia, hyperparathyroidism, anal prolapse sent to the emergency department at [facility] today for questionable declining mental status and elevated heart rate today. Admitted for fever.          89-year-old female with history of hypertension, mitral valve prolapse, osteoporosis, dementia, coronary artery disease, diverticulosis, hyperlipidemia, hyperparathyroidism, anal prolapse sent to the emergency department at [facility] today for questionable declining mental status and elevated heart rate today. Admitted for sepsis sec to suspected sterco colitis and uti, metabolic encephalopathy, and napoleon

## 2023-09-14 NOTE — ED ADULT TRIAGE NOTE - CHIEF COMPLAINT QUOTE
patient brought in from [facility]- lethargic, weak, difficult to arouse. fingerstick- 159, as per ems, patient was 80-% on room air, on non rebreather now

## 2023-09-14 NOTE — H&P ADULT - NSHPPHYSICALEXAM_GEN_ALL_CORE
T(C): 38.1 (09-14-23 @ 17:47), Max: 38.1 (09-14-23 @ 17:47)  HR: 110 (09-14-23 @ 17:47) (110 - 118)  BP: 113/81 (09-14-23 @ 17:47) (113/81 - 126/70)  RR: 18 (09-14-23 @ 17:47) (18 - 18)  SpO2: 97% (09-14-23 @ 17:47) (97% - 98%)    GENERAL: patient appears fearful, no acute distress, appropriately interactive  EYES: sclera clear, no exudates  HENMT: left eyebrow laceration, bruise under left eye, oropharynx clear without erythema, no exudates, moist mucous membranes  NECK: supple, soft  LUNGS: good air entry bilaterally, clear to auscultation, symmetric breath sounds, no wheezing or rhonchi appreciated  HEART: soft S1/S2, regular rate and rhythm, AS murmur heard throughout precordium, +1 BL pitting edema   GASTROINTESTINAL: abdomen is soft, nontender, nondistended, normoactive bowel sounds  INTEGUMENT: good skin turgor, warm skin, appears well perfused  MUSCULOSKELETAL: no clubbing or cyanosis, no obvious deformity  NEUROLOGIC: awake, alert, oriented x0, good muscle tone in all 4 extremities  HEME/LYMPH: no obvious ecchymosis or petechiae

## 2023-09-14 NOTE — ED ADULT NURSE NOTE - NSICDXPASTMEDICALHX_GEN_ALL_CORE_FT
PAST MEDICAL HISTORY:  Age Related Osteoporosis     Fungal infection left foot ; tx with cream ; x 3 weeks ; pt reports improvement    Heart murmur     History of Hypertension     [place] (hard of hearing)     MVP (mitral valve prolapse)     Otosclerosis

## 2023-09-14 NOTE — ED ADULT NURSE NOTE - OBJECTIVE STATEMENT
Received pt sent in for increased AMS, decreased PO intake and tachycardia noted over the last several days by SNF.   Pt alert and confused at this time, respirations even and unlabored pt speaking in clear complete sentences.  Pt previously received and monitored though at this time pt appears to be hot to touch, Received pt sent in for increased AMS, decreased PO intake and tachycardia noted over the last several days by SNF.   Pt alert and confused at this time, respirations even and unlabored pt speaking in clear complete sentences.  Pt previously received and monitored though at this time pt appears to be hot to touch,  Pt safety maintained dr alarcon aware of concerns for fever.   Pt turned, cleaned and positioned for comfort. VIK

## 2023-09-14 NOTE — H&P ADULT - ATTENDING COMMENTS
89-year-old female with history of hypertension, mitral valve prolapse, osteoporosis, dementia, coronary artery disease, diverticulosis, hyperlipidemia, hyperparathyroidism, anal prolapse sent to the emergency department at [facility] today for questionable declining mental status and elevated heart rate today. Admitted for sepsis sec to suspected sterco colitis and uti, metabolic encephalopathy, and napoleon Plan: cont iv antibx empirically, monitor i/os, monitor on remote tele, trend na, consider nephro if not improving, monitor mentation

## 2023-09-14 NOTE — H&P ADULT - HISTORY OF PRESENT ILLNESS
89-year-old female with history of hypertension, mitral valve prolapse, osteoporosis, dementia, coronary artery disease, diverticulosis, hyperlipidemia, hyperparathyroidism, anal prolapse sent to the emergency department at [facility] today for questionable declining mental status and elevated heart rate today. Hx elicited from daughter Elvira who is also the health care proxy. Daughter states that last Tuesday pt fell 2/2 dehydration at the assisted living facility and has been declining ever since. Prior to this hospital visit, pt did not require a walker and knew her name and family members; however, now requires a walker and is A&Ox0. Today pt was sent into the ED for increasing lethargy.       ED course  Vitals: T 100.5, , /81, RR 18, SpO2 97% on RA  Significant labs: WBC 12.17, Na 156, BUN/Cr 43/1.7, lactate 1.4  RVP neg  UA shows moderate leuk esterase, WBC 21, few bacteria, +squamous cells  Imaging:   CT head shows Motion limited examination. Age-appropriate involutional changes. No acute intracranial pathology. Specifically, no acute intracranial hemorrhage.  CT chest/Abdomen pelvis shows Stercoral proctitis. Small subpleural opacity right lung middle lobe may be infectious or inflammatory.  EKG: Sinus Tach 117, RBBB; similar to previous   In ED patient given: 1L LR bolus x1, ofirmev x1, Rocephin 1g x1, Azithro 500mg x1

## 2023-09-14 NOTE — ED PROVIDER NOTE - EKG #1 DATE/TIME
Aruna left another message in Polish. I ask Liset and she stated that they need to contact their insurance. Since they are out of network they can let them know how much money will they be paying. If patients has any questions to call us back at [phone].   14-Sep-2023

## 2023-09-14 NOTE — ED PROVIDER NOTE - CARE PLAN
Principal Discharge DX:	Proctocolitis  Secondary Diagnosis:	Acute UTI  Secondary Diagnosis:	Pneumonia  Secondary Diagnosis:	Weakness   1

## 2023-09-14 NOTE — H&P ADULT - PROBLEM SELECTOR PLAN 1
Pt presents to the ED for altered mental status at assisted living facility  - likely secondary to worsening dementia vs acute infectious process   - Pt meets Sepsis criteria on admission (T100.5, , WBC >12, source)   - s/p 1L LR bolus x1, ofirmev x1, Rocephin 1g x1, Azithro 500mg x1   - CT head shows Motion limited examination. Age-appropriate involutional changes. No acute intracranial pathology. Specifically, no acute intracranial hemorrhage.  - CT chest/Abdomen pelvis shows Stercoral proctitis. Small subpleural opacity right lung middle lobe may be infectious or inflammatory.  -  RVP negative  - UA shows moderate leuk esterase, WBC 21, few bacteria, +squamous cells   - f/u Urine culture   - f/u Blood Cx x2   - f/u repeat lactate   - tylenol prn for fever   - continue rocephin and azithro   - ID consulted, Dr. Perez, f/u recs Pt presents to the ED for altered mental status at assisted living facility  - likely secondary to worsening dementia vs acute infectious process   - Pt meets Sepsis criteria on admission (T100.5, , WBC >12, source)   - s/p 1L LR bolus x1, ofirmev x1, Rocephin 1g x1, Azithro 500mg x1   - CT head shows Motion limited examination. Age-appropriate involutional changes. No acute intracranial pathology. Specifically, no acute intracranial hemorrhage.  - CT chest/Abdomen pelvis shows Stercoral proctitis. Small subpleural opacity right lung middle lobe may be infectious or inflammatory.  -  RVP negative  - UA shows moderate leuk esterase, WBC 21, few bacteria, +squamous cells   - f/u Urine culture, strep, legionella   - f/u Blood Cx x2   - f/u repeat lactate   - f/u procal  - tylenol prn for fever   - continue rocephin and azithro  - f/u speech and swallow    - ID consulted, Dr. Perez, f/u recs Pt presents to the ED for altered mental status at assisted living facility  - likely secondary to worsening dementia vs acute infectious process   - Pt meets Sepsis criteria on admission (T100.5, , WBC >12, source)   - s/p 1L LR bolus x1, ofirmev x1, Rocephin 1g x1, Azithro 500mg x1   - CT head shows Motion limited examination. Age-appropriate involutional changes. No acute intracranial pathology. Specifically, no acute intracranial hemorrhage.  - CT chest/Abdomen pelvis shows Stercoral proctitis. Small subpleural opacity right lung middle lobe may be infectious or inflammatory.  -  RVP negative  - UA shows moderate leuk esterase, WBC 21, few bacteria, +squamous cells   - f/u Urine culture, strep, legionella   - f/u Blood Cx x2   - f/u repeat lactate   - f/u procal  - tylenol prn for fever   - continue rocephin and azithro  -bowel regimen for stercocolitis, consider GI recs if not improving  - f/u speech and swallow    - ID consulted, Dr. Perez, f/u recs

## 2023-09-14 NOTE — ED PROVIDER NOTE - DIFFERENTIAL DIAGNOSIS
Differential diagnosis for this alteration in behavior and weakness includes dehydration/electrolyte abnormality/occult infection/intracerebral process such as subdural or intracerebral bleed. Differential Diagnosis

## 2023-09-14 NOTE — PATIENT PROFILE ADULT - FUNCTIONAL ASSESSMENT - BASIC MOBILITY 6.
Not able to assess (calculate score using AMPAC averaging method)  1-calculated by average/Not able to assess (calculate score using [facility] averaging method)

## 2023-09-15 LAB
ALBUMIN SERPL ELPH-MCNC: 2.7 G/DL — LOW (ref 3.3–5)
ALP SERPL-CCNC: 78 U/L — SIGNIFICANT CHANGE UP (ref 40–120)
ALT FLD-CCNC: 32 U/L — SIGNIFICANT CHANGE UP (ref 12–78)
ANION GAP SERPL CALC-SCNC: 3 MMOL/L — LOW (ref 5–17)
AST SERPL-CCNC: 25 U/L — SIGNIFICANT CHANGE UP (ref 15–37)
BASOPHILS # BLD AUTO: 0.06 K/UL — SIGNIFICANT CHANGE UP (ref 0–0.2)
BASOPHILS NFR BLD AUTO: 0.6 % — SIGNIFICANT CHANGE UP (ref 0–2)
BILIRUB SERPL-MCNC: 0.7 MG/DL — SIGNIFICANT CHANGE UP (ref 0.2–1.2)
BUN SERPL-MCNC: 31 MG/DL — HIGH (ref 7–23)
CALCIUM SERPL-MCNC: 11.8 MG/DL — HIGH (ref 8.5–10.1)
CHLORIDE SERPL-SCNC: 124 MMOL/L — HIGH (ref 96–108)
CO2 SERPL-SCNC: 28 MMOL/L — SIGNIFICANT CHANGE UP (ref 22–31)
CREAT SERPL-MCNC: 1.4 MG/DL — HIGH (ref 0.5–1.3)
EGFR: 36 ML/MIN/1.73M2 — LOW
EOSINOPHIL # BLD AUTO: 0.36 K/UL — SIGNIFICANT CHANGE UP (ref 0–0.5)
EOSINOPHIL NFR BLD AUTO: 3.8 % — SIGNIFICANT CHANGE UP (ref 0–6)
GLUCOSE SERPL-MCNC: 100 MG/DL — HIGH (ref 70–99)
HCT VFR BLD CALC: 40.3 % — SIGNIFICANT CHANGE UP (ref 34.5–45)
HGB BLD-MCNC: 13.4 G/DL — SIGNIFICANT CHANGE UP (ref 11.5–15.5)
IMM GRANULOCYTES NFR BLD AUTO: 0.3 % — SIGNIFICANT CHANGE UP (ref 0–0.9)
LACTATE SERPL-SCNC: 1.2 MMOL/L — SIGNIFICANT CHANGE UP (ref 0.7–2)
LEGIONELLA AG UR QL: NEGATIVE — SIGNIFICANT CHANGE UP
LYMPHOCYTES # BLD AUTO: 1.4 K/UL — SIGNIFICANT CHANGE UP (ref 1–3.3)
LYMPHOCYTES # BLD AUTO: 15 % — SIGNIFICANT CHANGE UP (ref 13–44)
MCHC RBC-ENTMCNC: 32.4 PG — SIGNIFICANT CHANGE UP (ref 27–34)
MCHC RBC-ENTMCNC: 33.3 GM/DL — SIGNIFICANT CHANGE UP (ref 32–36)
MCV RBC AUTO: 97.6 FL — SIGNIFICANT CHANGE UP (ref 80–100)
MONOCYTES # BLD AUTO: 0.85 K/UL — SIGNIFICANT CHANGE UP (ref 0–0.9)
MONOCYTES NFR BLD AUTO: 9.1 % — SIGNIFICANT CHANGE UP (ref 2–14)
NEUTROPHILS # BLD AUTO: 6.66 K/UL — SIGNIFICANT CHANGE UP (ref 1.8–7.4)
NEUTROPHILS NFR BLD AUTO: 71.2 % — SIGNIFICANT CHANGE UP (ref 43–77)
NRBC # BLD: 0 /100 WBCS — SIGNIFICANT CHANGE UP (ref 0–0)
PLATELET # BLD AUTO: 155 K/UL — SIGNIFICANT CHANGE UP (ref 150–400)
POTASSIUM SERPL-MCNC: 3.2 MMOL/L — LOW (ref 3.5–5.3)
POTASSIUM SERPL-SCNC: 3.2 MMOL/L — LOW (ref 3.5–5.3)
PROCALCITONIN SERPL-MCNC: 0.09 NG/ML — HIGH
PROT SERPL-MCNC: 6.9 G/DL — SIGNIFICANT CHANGE UP (ref 6–8.3)
RBC # BLD: 4.13 M/UL — SIGNIFICANT CHANGE UP (ref 3.8–5.2)
RBC # FLD: 12.8 % — SIGNIFICANT CHANGE UP (ref 10.3–14.5)
S PNEUM AG UR QL: NEGATIVE — SIGNIFICANT CHANGE UP
SODIUM SERPL-SCNC: 155 MMOL/L — HIGH (ref 135–145)
WBC # BLD: 9.36 K/UL — SIGNIFICANT CHANGE UP (ref 3.8–10.5)
WBC # FLD AUTO: 9.36 K/UL — SIGNIFICANT CHANGE UP (ref 3.8–10.5)

## 2023-09-15 PROCEDURE — 99222 1ST HOSP IP/OBS MODERATE 55: CPT

## 2023-09-15 PROCEDURE — 99232 SBSQ HOSP IP/OBS MODERATE 35: CPT

## 2023-09-15 RX ADMIN — HEPARIN SODIUM 5000 UNIT(S): 5000 INJECTION INTRAVENOUS; SUBCUTANEOUS at 18:26

## 2023-09-15 RX ADMIN — CEFTRIAXONE 100 MILLIGRAM(S): 500 INJECTION, POWDER, FOR SOLUTION INTRAMUSCULAR; INTRAVENOUS at 18:26

## 2023-09-15 RX ADMIN — RISPERIDONE 0.25 MILLIGRAM(S): 4 TABLET ORAL at 05:36

## 2023-09-15 RX ADMIN — HEPARIN SODIUM 5000 UNIT(S): 5000 INJECTION INTRAVENOUS; SUBCUTANEOUS at 05:35

## 2023-09-15 RX ADMIN — RISPERIDONE 0.25 MILLIGRAM(S): 4 TABLET ORAL at 18:26

## 2023-09-15 RX ADMIN — POLYETHYLENE GLYCOL 3350 17 GRAM(S): 17 POWDER, FOR SOLUTION ORAL at 11:27

## 2023-09-15 RX ADMIN — Medication 20 MILLIEQUIVALENT(S): at 11:23

## 2023-09-15 RX ADMIN — Medication 2000 UNIT(S): at 11:23

## 2023-09-15 RX ADMIN — AZITHROMYCIN 255 MILLIGRAM(S): 500 TABLET, FILM COATED ORAL at 22:00

## 2023-09-15 NOTE — SWALLOW BEDSIDE ASSESSMENT ADULT - ORAL PREPARATORY PHASE
Within functional limits Improved bolus prep Absent mastication effort/Refuses to accept bolus into oral cavity/Reduced oral grading/Bolus falls into anterior sulcus/Bolus falls into left lateral sulci

## 2023-09-15 NOTE — CONSULT NOTE ADULT - SUBJECTIVE AND OBJECTIVE BOX
[facility]  INFECTIOUS DISEASES   [address] [place] [address]  Tel: [phone]     Fax: [phone]  ========================================================  MD Nicole Kerns Kaushal, MD Cho, Michelle, MD Sunjit, Jaspal, MD  ========================================================    MRN-[id number]  TUNG MILLER     CC: Altered mental status     HPI:  89yo woman with PMH of HTN, CAD, MVprolapse, osteoporosis, dementia, hyperparathyroidism and anal prolapse sent to the ED for altered mental status and tachycardia.   History is taken from chart that has been "elicited from daughter Elvira who is also the health care proxy. Daughter states that last Tuesday pt fell 2/2 dehydration at the assisted living facility and has been declining ever since. Prior to this hospital visit, pt did not require a walker and knew her name and family members; however, now requires a walker and is A&Ox0. Today pt was sent into the ED for increasing lethargy."  CT chest showed possible pneumonia in Left lung with possibility of aspiration pneumonia ID was called for recommendations.     PAST MEDICAL & SURGICAL HISTORY:  History of Hypertension  Age Related Osteoporosis  Heart murmur  Otosclerosis  Fungal infection  left foot ; tx with cream ; x 3 weeks ; pt reports improvement  MVP (mitral valve prolapse)  [place] (hard of hearing)  Anal prolapse  H/O:  Section  7, 0, 1963  LIH Repair  Right Stapedectomy    Left Stapedectomy    Right  Cataract  Excision    Left Cataract    Surgical Treatment Otosclerosis   ? "Mobilization of Stapes"  Otosclerosis of right ear  S/P Stapedectomy- 10/12/1978  Otosclerosis of left ear  stapedectomy- s  S/P hernia repair  ventral hernia with mesh- 10/2012  S/P left inguinal hernia repair  with mesh   S/P colonoscopy with polypectomy  times 2    Social Hx: No smoking, EtOH or drugs     FAMILY HISTORY: Noncontributory       Allergies    Shrimp (Unknown)  penicillins (Hives)  Aricept (Unknown)  Onions (Unknown)  hydrochlorothiazide (Faint)  sertraline (Unknown)  amoxicillin (Unknown)  rivastigmine (Unknown)    Antibiotics:  MEDICATIONS  (STANDING):  azithromycin  IVPB 500 milliGRAM(s) IV Intermittent every 24 hours  cefTRIAXone   IVPB 1000 milliGRAM(s) IV Intermittent every 24 hours  cholecalciferol 2000 Unit(s) Oral daily  dextrose 5%. 1000 milliLiter(s) (60 mL/Hr) IV Continuous <Continuous>  heparin   Injectable 5000 Unit(s) SubCutaneous every 12 hours  polyethylene glycol 3350 17 Gram(s) Oral daily  potassium chloride    Tablet ER 20 milliEquivalent(s) Oral daily  risperiDONE   Tablet 0.25 milliGRAM(s) Oral two times a day    MEDICATIONS  (PRN):  acetaminophen     Tablet .. 650 milliGRAM(s) Oral every 6 hours PRN Temp greater or equal to 38C (100.4F)     REVIEW OF SYSTEMS:  CONSTITUTIONAL:  No Fever or chills  HEENT:  No diplopia or blurred vision.  No sore throat or runny nose.  CARDIOVASCULAR:  No chest pain or SOB.  RESPIRATORY:  No cough, shortness of breath, PND or orthopnea.  GASTROINTESTINAL:  No nausea, vomiting or diarrhea.  GENITOURINARY:  No dysuria, frequency or urgency. No Blood in urine  MUSCULOSKELETAL:  no joint aches, no muscle pain  SKIN:  No change in skin, hair or nails.  NEUROLOGIC:  No paresthesias or weakness.  PSYCHIATRIC:  No disorder of thought or mood.  ENDOCRINE:  No heat or cold intolerance, polyuria or polydipsia.  HEMATOLOGICAL:  No easy bruising or bleeding.     Physical Exam:  Vital Signs Last 24 Hrs  T(C): 36.6 (15 Sep 2023 05:32), Max: 38.1 (14 Sep 2023 17:47)  T(F): 97.8 (15 Sep 2023 05:32), Max: 100.5 (14 Sep 2023 17:47)  HR: 92 (15 Sep 2023 05:32) (92 - 118)  BP: 118/80 (15 Sep 2023 05:32) (113/81 - 126/70)  BP(mean): --  RR: 18 (15 Sep 2023 05:32) (18 - 18)  SpO2: 100% (15 Sep 2023 05:32) (94% - 100%)    Parameters below as of 15 Sep 2023 05:32  Patient On (Oxygen Delivery Method): room air      Height (cm): 157.5 ( @ 13:39)  Weight (kg): 57.2 ( @ 13:39)  BMI (kg/m2): 23.1 ( @ 13:39)  BSA (m2): 1.57 ( @ 13:39)  GEN: NAD  HEENT: normocephalic and atraumatic.   NECK: Supple.  No lymphadenopathy   LUNGS: Coarse breath sounds with scattered rhonchi   HEART: Regular rate and rhythm   ABDOMEN: Soft, nontender, and nondistended.  Positive bowel sounds.    EXTREMITIES: Without edema.  NEUROLOGIC: unable   PSYCHIATRIC: stuporous. .  SKIN: No rash     Labs:  09-15    155<H>  |  124<H>  |  31<H>  ----------------------------<  100<H>  3.2<L>   |  28  |  1.40<H>    Ca    11.8<H>      15 Sep 2023 08:20    TPro  6.9  /  Alb  2.7<L>  /  TBili  0.7  /  DBili  x   /  AST  25  /  ALT  32  /  AlkPhos  78  09-15                        13.4   9.36  )-----------( 155      ( 15 Sep 2023 08:20 )             40.3     PT/INR - ( 14 Sep 2023 15:55 )   PT: 12.6 sec;   INR: 1.08 ratio    PTT - ( 14 Sep 2023 15:55 )  PTT:26.1 sec  Urinalysis Basic - ( 15 Sep 2023 08:20 )    Color: x / Appearance: x / SG: x / pH: x  Gluc: 100 mg/dL / Ketone: x  / Bili: x / Urobili: x   Blood: x / Protein: x / Nitrite: x   Leuk Esterase: x / RBC: x / WBC x   Sq Epi: x / Non Sq Epi: x / Bacteria: x    LIVER FUNCTIONS - ( 15 Sep 2023 08:20 )  Alb: 2.7 g/dL / Pro: 6.9 g/dL / ALK PHOS: 78 U/L / ALT: 32 U/L / AST: 25 U/L / GGT: x           Procalcitonin, Serum: 0.09 ng/mL (09-15-23 @ 08:20)    SARS-CoV-2 Result: NotDetec (23 @ 15:55)  SARS-CoV-2 Result: NotDetec (23 @ 10:00)    RECENT CULTURES:   @ 10:05 .Blood Blood-Peripheral     No growth at 5 days     @ 10:00 .Blood Blood-Peripheral Enterococcus faecalis    No growth at 5 days    All imaging and other data have been reviewed.  < from: CT Abdomen and Pelvis No Cont (23 @ 18:04) >  IMPRESSION:  Stercoral proctitis.  Small subpleural opacity right lung middle lobe may be infectious or   inflammatory.    Assessment and Plan:   89yo woman with PMH of HTN, CAD, MVprolapse, osteoporosis, dementia, hyperparathyroidism and anal prolapse sent to the ED for altered mental status and tachycardia.   CT chest showed possible pneumonia in Left lung with possibility of aspiration pneumonia    # AMS  # Aspiration pneumonia     - Will follow cultures   - Monitor Tmax, WBC and renal function  - Will follow legionella and strep urinary antigen   - Will Continue ceftriaxone and azithromycin   - Speech and swallow eval   - Palliative care/GOC     Thank you for courtesy of this consult.     Will follow.  Discussed with the primary team.     Mita Perez MD  Division of Infectious Diseases   Please call ID service at [phone] with any question.    75 minutes spent on total encounter assessing patient, examination, chart review, counseling and coordinating care by the attending physician/nurse/care manager.

## 2023-09-15 NOTE — CONSULT NOTE ADULT - NS ATTEND AMEND GEN_ALL_CORE FT
pt Unable to provide meaningful information. she p/w with tachycardia likely 2/2 dehydration in setting of infectious process. Her exam is c/w sig AS. check echo when possible.   IVF. monitor for hf  EKG w RBBB essential unchanged.   Further cardiac workup will depend on clinical course.

## 2023-09-15 NOTE — CARE COORDINATION ASSESSMENT. - EMPLOYMENT/FINANCIAL CONCERNS
[facility] [place] [facility]   Patient: Annita Balderas  Today's Date: 12/9/2022    YOB: 1947  Admission Date: 12/7/2022    MRN: [id number]  Inpatient LOS: 1    Room: 107/01  Hospital Day: Hospital Day: 3      HISTORY AND SUBJECTIVE COMPLAINTS     Chief Complaint:   Abdominal Pain      Interval History / Subjective:   Patient was seen and evaluated  Reviewed the events since admission  Status post surgery and currently started on TPN  NG with minimal output  Not passing gas rectally  Intermittent abdominal pain  Noted high blood pressures  Discussed with staff  Noted the General surgery input    ROS:  Pertinent systems negative except as above.    Current Medications  acetaminophen, 1,000 mg, Q6H  potassium CHLORIDE, 20 mEq, Once  enoxaparin, 40 mg, Q24H  metoPROLOL, 5 mg, 4 times per day  sodium chloride (PF), 2 mL, 2 times per day  PARENTERAL NUTRITION - DIETITIAN/PHARMACIST MANAGED, , See Admin Instructions  insulin lispro, , 4 times per day  sodium chloride (PF), 10 mL, 2 times per day  sodium chloride (PF), 10 mL, 2 times per day  sodium chloride (PF), 10 mL, 2 times per day  Fat Emulsion Plant Based (Soy), 250 mL, Q24H  thiamine (VITAMIN B1) injection, 100 mg, Daily  folic acid (FOLATE) injection, 1 mg, Daily      Current Facility-Administered Medications   Medication Dose Route Frequency Provider Last Rate Last Admin   • sodium chloride 0.9% infusion   Intravenous Continuous Yogesh Fitzgerald MD       • parenteral nutrition adult standard refeeding prevention/central or peripheral (1500 mL)   Intravenous Continuous PN Yogesh Fitzgerald MD       • dextrose 10 % infusion  1,000 mL Intravenous Continuous PRN Yogesh Fitzgerald MD       • parenteral nutrition adult standard refeeding prevention/central or peripheral (1000 mL)   Intravenous Continuous PN Yogesh Fitzgerald MD 42.1 mL/hr at 12/08/22 2301 New Bag at 12/08/22 2301   • sodium chloride 0.9% infusion    Intravenous Continuous Yogesh Fitzgerald MD 83 mL/hr at 12/09/22 1127 New Bag at 12/09/22 1127     PRN Medications  Current Facility-Administered Medications   Medication Dose Route Frequency Provider Last Rate Last Admin   • fentaNYL (SUBLIMAZE) injection 25 mcg  25 mcg Intravenous Q1H PRN Donya Melo MD       • ketorolac (TORADOL) injection 15 mg  15 mg Intravenous Q6H PRN Fanny Hernandez, NP   15 mg at 12/09/22 1126   • hydrALAZINE (APRESOLINE) injection 10 mg  10 mg Intravenous Q4H PRN Sj Jensen MD       • labetalol (NORMODYNE) injection 20 mg  20 mg Intravenous Q4H PRN Sj Jensen MD       • dextrose 50 % injection 25 g  25 g Intravenous PRN Donya Melo MD       • dextrose 50 % injection 12.5 g  12.5 g Intravenous PRN Donya Melo MD       • glucagon (GLUCAGEN) injection 1 mg  1 mg Intramuscular PRN Donya Melo MD       • dextrose (GLUTOSE) 40 % gel 15 g  15 g Oral PRN Donya Melo MD       • dextrose (GLUTOSE) 40 % gel 30 g  30 g Oral PRN Donya Melo MD       • sodium chloride 0.9 % flush bag 25 mL  25 mL Intravenous PRN Donya Melo MD 50 mL/hr at 12/08/22 0400 Rate Verify at 12/08/22 0400   • ondansetron (ZOFRAN) injection 4 mg  4 mg Intravenous Q6H PRN Donya Melo MD       • sodium chloride (PF) 0.9 % injection 10 mL  10 mL Injection PRN Thalia Hart MD       • sodium chloride (PF) 0.9 % injection 10 mL  10 mL Injection PRN Thalia Hart MD       • sodium chloride (PF) 0.9 % injection 10 mL  10 mL Injection PRN Thalia Hart MD       • sodium chloride (PF) 0.9 % injection 20 mL  20 mL Injection PRN Thalia Hart MD       • dextrose 10 % infusion  1,000 mL Intravenous Continuous PRN Yogesh Fitzgerald MD       • diphenhydrAMINE (BENADRYL) injection 25 mg  25 mg Intravenous Q6H PRN Thalia Hart MD   25 mg at 12/08/22 2053         PHYSICAL EXAMINATION     Vital 24 Hour Range Most Recent Value   Temperature Temp  Min: 97.1  °F (36.2 °C)  Max: 98.2 °F (36.8 °C) 97.8 °F (36.6 °C)   Pulse Pulse  Min: 73  Max: 108 (!) 108   Respiratory Resp  Min: 16  Max: 17 17   Blood Pressure BP  Min: 131/63  Max: 185/82 (!) 185/82   Pulse Oximetry SpO2  Min: 88 %  Max: 96 % 93 %   Arterial BP No data recorded     O2 O2 Flow Rate (L/min)  Av.1 L/min  Min: 2 L/min   Min taken time: 22 1630  Max: 2.5 L/min   Max taken time: 22 1632       Recorded Intake and Output:    Intake/Output Summary (Last 24 hours) at 2022 1655  Last data filed at 2022 1100  Gross per 24 hour   Intake 2466.84 ml   Output 3205 ml   Net -738.16 ml      Recorded Last Stool Occurrence:       Vital Most Recent Value First Value   Weight 81.8 kg (180 lb 3.6 oz) Weight: 87.1 kg (192 lb)   Height 5' 8\" (172.7 cm) Height: 5' 8\" (172.7 cm)   BMI 27.4 N/A     Objective:  General Appearance:  Ill-appearing, uncomfortable and in no acute distress.    Vital signs: (most recent): Blood pressure (!) 185/82, pulse (!) 108, temperature 97.8 °F (36.6 °C), temperature source Temporal, resp. rate 17, height 5' 8\" (1.727 m), weight 81.8 kg (180 lb 3.6 oz), SpO2 93 %.  No fever.    HEENT: Normal HEENT exam.  ( place)    Lungs:  Normal effort and normal respiratory rate.  Breath sounds clear to auscultation.    Heart: Normal rate.  Regular rhythm.  S1 normal and S2 normal.    Abdomen: (Status post surgical abdomen  Decreased bowel sounds but present).    Extremities: Normal range of motion.    Pulses: Distal pulses are intact.    Neurological: Patient is alert and oriented to person, place and time.    Skin:  Dry and cool.        TEST RESULTS     Labs: The Laboratory values listed below have been reviewed and pertinent findings discussed in the Assessment and Plan.  Laboratory values:   Recent Labs   Lab 22  0844 22  0710 22  2313   WBC 9.8 10.7 15.8*   HGB 9.4* 11.1* 11.7*   HCT 27.8* 33.6* 34.5*    414 550*       Recent Labs   Lab 22  0438  12/08/22  0925 12/08/22  0710 12/07/22  2313   SODIUM 141 138 138 136   POTASSIUM 3.5 3.4 3.6 3.2*   CHLORIDE 106 104 103 98   CO2 28 28 25 25   CALCIUM 9.0 8.7 9.1 10.5*   GLUCOSE 110* 129* 169* 147*   BUN 15 16 16 17   CREATININE 1.26* 1.19* 1.38* 1.56*   MG 1.8 1.2*  --  1.5*        Recent Labs   Lab 12/09/22  0432 12/08/22  0925 12/08/22  0710 12/07/22  2313   ALBUMIN  --  2.7* 2.9* 3.6   PHOS 3.5 3.1  --   --    AST  --  13 22 15   GPT  --  22 20 24   BILIRUBIN  --  0.4 0.4 0.6     Recent Labs   Lab 12/04/22  1834 12/04/22  1818   LACTA 1.2  --    CRP  --  1.6*     Recent Labs   Lab 12/09/22  0010 12/09/22  0625 12/09/22  1205   GLUCOSE BEDSIDE 109* 98 110*         No results found    No results found for: VB12, MARGUERITE, VITD25, TSH, HGBA1C     No results found for: CHOLESTEROL, TRIGLYCERDES, HDL, CALCLDL     Lab Results   Component Value Date    USPG 1.010 12/08/2022    UPROT Negative 12/08/2022    UWBC Negative 12/08/2022    URBC Negative 12/08/2022    UNITR Negative 12/08/2022    UPH 6.5 12/08/2022       Recent Labs   Lab 12/08/22  0925   PT 10.9   INR 1.1         Radiology: Imaging studies have been reviewed and pertinent findings discussed in the Assessment and Plan.  Results for orders placed or performed during the hospital encounter of 12/07/22 (from the past 48 hour(s))   CT ABDOMEN PELVIS WO CONTRAST    Impression    IMPRESSION:    Small bowel obstruction with transition point in the distal small bowel in  the anterior abdomen in the midline. Mild reactive mesenteric fat  stranding.     XR Tube Check    Impression    IMPRESSION:     Enteric tube tip in the body of the stomach.           XR CHEST AP OR PA    Impression    IMPRESSION:    Right subclavian central venous catheter tip in the low superior vena cava.  No pneumothorax.   XR Tube Check    Impression    FINDINGS/IMPRESSION:    Nasogastric tube terminating over the stomach, side port terminates over  the gastroesophageal junction, similar to  13 radiographs 12/8/2022 and may  benefit from approximately 6 cm advancement.    Right IJ central line terminating over the SVC. Atelectasis or airspace  disease in the bilateral lung bases and right midlung, similar to  radiographs 12/8/2022.    No large pleural effusions. No pneumothorax.     XR Tube Check    Impression    IMPRESSION:        1. Stable position of the nasogastric tube. The side port remains at the  gastroesophageal junction with the tip in the gastric fundus.  2. Bibasilar atelectasis without significant change.  3. Small right-sided pleural effusion, slightly worsened.  4. Small-volume pneumoperitoneum commensurate with recent surgery.        ANCILLARY ORDERS     Diet:  Tpn Diet Without Diet Tray  Telemetry: Off  Consults:    IP CONSULT TO GENERAL SURGERY  IP CONSULT TO NUTRITIONAL SERVICES - PN  PHARMACY TO DOSE PARENTERAL NUTRITION  INPATIENT DIETARY CONSULT TO PHARMACY FOR PARENTERAL NUTRITION ORDERING  INPATIENT DIETARY CONSULT TO PHARMACY FOR PARENTERAL NUTRITION ORDERING  Therapy Orders:   PT and OT Orders Placed this Encounter   Procedures   • Occupational Therapy   • Physical Therapy     Advance Care Planning      ASSESSMENT AND PLAN     Hospital Course:  Annita Balderas is a 75 year old male who admited on 12/7/2022       Principal Problem:    SBO (small bowel obstruction) (CMS/HCC)  Active Problems:    HTN (hypertension)      Plan    Small-bowel obstruction  ct showed a small bowel obstruction with transition point in the distal small bowel in the anterior abdomen in the midline. Mild reactive mesenteric fat stranding  Status post surgery and general surgery closely following postoperatively with currently on TPN  Continue p.r.n. pain medications    Chronic renal failure, stage 3  Creatinine at his baseline and continue to closely monitor with avoiding the nephrotoxic medications     hyperlipidemia  Resume statin once taking p.o.     DM type 2  -on metformin at home  Continue sliding  No scale of insulin     Hypertension  Unable to give p.o. medications and continue the IV p.r.n. blood pressure medications and also schedule the metoprolol IV q.6 hours     Hypothyroidism  Will change the Synthroid to IV until he takes p.o.     Hypokalemia  On replacement protocol     Hypercalcemia  ca 10.5 at the time of admission  Improved       Code Status: Full Resuscitation   Smoking status: non smoker    Nutrition status: appropriate  Body mass index is 27.4 kg/m². - Overweight BMI 25-29  DVT Prophylaxis: Heparin 5000 units sub q bid           DISCHARGE PLANNING     The patient's treatment plans were discussed with patient.     Recommendations for Discharge   SW     PT Home with Home therapy   OT Home with Home therapy   SLP        Anticipated discharge destination: Home with home health  Expected Discharge Date:  To be determined  Barriers to Discharge:  Currently on TPN with the postoperative recovery        Sj Jensen MD  Hospitalist  12/9/2022  4:55 PM

## 2023-09-15 NOTE — SWALLOW BEDSIDE ASSESSMENT ADULT - SWALLOW EVAL: CRITERIA FOR SKILLED INTERVENTION MET
Pt is not able to follow commands and therefore would not be able to carry-out functional feeding/swallowing strategies/not appropriate for swallowing intervention

## 2023-09-15 NOTE — SWALLOW BEDSIDE ASSESSMENT ADULT - SWALLOW EVAL: DIAGNOSIS
Pt presents with significant oral dysphagia marked by reduced awareness to feeding tasks, reduced awareness of bolus in oral cavity with oral holding and buccal pocketing benefitting from removal from oral cavity. Pt with improved lingual movements and bolus progression for puree consistency. Pharyngeal stage was assessed via digital palpation and marked by a +prompt swallow trigger and +hyolaryngeal elevation. No overt s/s of penetration/aspiration.

## 2023-09-15 NOTE — PROGRESS NOTE ADULT - SUBJECTIVE AND OBJECTIVE BOX
Patient is a 89y old  Female who presents with a chief complaint of Altered mental status (15 Sep 2023 14:40)      SUBJECTIVE / OVERNIGHT EVENTS: pt seen and examined. no fever, no shob, NAD, no c/o pain, made dnr an dhospice referral made, as per family no extraordinary measures        Vital Signs Last 24 Hrs  T(C): 36.6 (15 Sep 2023 05:32), Max: 37.2 (14 Sep 2023 21:30)  T(F): 97.8 (15 Sep 2023 05:32), Max: 98.9 (14 Sep 2023 21:30)  HR: 92 (15 Sep 2023 05:32) (92 - 94)  BP: 118/80 (15 Sep 2023 05:32) (118/80 - 121/84)  BP(mean): --  RR: 18 (15 Sep 2023 05:32) (18 - 18)  SpO2: 100% (15 Sep 2023 05:32) (94% - 100%)    Parameters below as of 15 Sep 2023 05:32  Patient On (Oxygen Delivery Method): room air      I&O's Summary      PHYSICAL EXAM:  GENERAL: NAD  HEAD:  Atraumatic, Normocephalic  NECK: Supple  CHEST/LUNG: CTABL  HEART: S1+S2  ABDOMEN: Soft, Nontender, Nondistended; Bowel sounds present  Neuro: no focal deficit  EXTREMITIES:  No edema  SKIN: No rashes or lesions    LABS:                        13.4   9.36  )-----------( 155      ( 15 Sep 2023 08:20 )             40.3     09-15    155<H>  |  124<H>  |  31<H>  ----------------------------<  100<H>  3.2<L>   |  28  |  1.40<H>    Ca    11.8<H>      15 Sep 2023 08:20    TPro  6.9  /  Alb  2.7<L>  /  TBili  0.7  /  DBili  x   /  AST  25  /  ALT  32  /  AlkPhos  78  09-15    PT/INR - ( 14 Sep 2023 15:55 )   PT: 12.6 sec;   INR: 1.08 ratio         PTT - ( 14 Sep 2023 15:55 )  PTT:26.1 sec  CAPILLARY BLOOD GLUCOSE            Urinalysis Basic - ( 15 Sep 2023 08:20 )    Color: x / Appearance: x / SG: x / pH: x  Gluc: 100 mg/dL / Ketone: x  / Bili: x / Urobili: x   Blood: x / Protein: x / Nitrite: x   Leuk Esterase: x / RBC: x / WBC x   Sq Epi: x / Non Sq Epi: x / Bacteria: x        RADIOLOGY & ADDITIONAL TESTS:    Imaging Personally Reviewed:  [x] YES  [ ] NO    Consultant(s) Notes Reviewed:  [x] YES  [ ] NO      MEDICATIONS  (STANDING):  azithromycin  IVPB 500 milliGRAM(s) IV Intermittent every 24 hours  cefTRIAXone   IVPB 1000 milliGRAM(s) IV Intermittent every 24 hours  cholecalciferol 2000 Unit(s) Oral daily  dextrose 5%. 1000 milliLiter(s) (60 mL/Hr) IV Continuous <Continuous>  heparin   Injectable 5000 Unit(s) SubCutaneous every 12 hours  polyethylene glycol 3350 17 Gram(s) Oral daily  potassium chloride    Tablet ER 20 milliEquivalent(s) Oral daily  risperiDONE   Tablet 0.25 milliGRAM(s) Oral two times a day    MEDICATIONS  (PRN):  acetaminophen     Tablet .. 650 milliGRAM(s) Oral every 6 hours PRN Temp greater or equal to 38C (100.4F)      Care Discussed with Consultants/Other Providers [x] YES  [ ] NO    HEALTH ISSUES - PROBLEM Dx:  Acute encephalopathy    Hypernatremia    KEELY (acute kidney injury)    HTN (hypertension)    Dementia    Swelling of lower extremity    Need for prophylactic measure

## 2023-09-15 NOTE — SWALLOW BEDSIDE ASSESSMENT ADULT - ORAL PHASE
Within functional limits Bolus removed from oral cavity/Decreased anterior-posterior movement of the bolus/Delayed oral transit time Decreased anterior-posterior movement of the bolus/Delayed oral transit time

## 2023-09-15 NOTE — CONSULT NOTE ADULT - ASSESSMENT
89-year-old female with history of hypertension, mitral valve prolapse, osteoporosis, dementia, coronary artery disease, diverticulosis, hyperlipidemia, hyperparathyroidism, anal prolapse sent to the emergency department at [facility] today for questionable declining mental status and elevated heart rate today, a/f sepsis sec to suspected sterco colitis and uti, metabolic encephalopathy, and napoleon     CAD, VHD, AMS  - p/w  AMS, likely secondary for worsening dementia, tachycardia. likely in  the setting of infectious process, dehydration, sepsis (met criteria) work up ongoing   - CT Chest: stercoral proctitis , small subpleural opacity Right Lung middle lobe may be infectious or inflammatory   - ID following, rec GI consult     - known hx of CAD, follows Dr Ogden (o/p cardiologist)   - EKG: ST with RBBB, nonspecific ST/ TWA, unchanged from prior  - no anginal complaints     - TTE  9/26/2022:  EF 63%, mod AS, severely dilated LA, mild DD  - f/u TTE while admitted to assess  AS.   - no sign of volume overload, b/l LE edema (chronic)   - hypernatremic Na: 158-> 155, on IVF, continue to trend   - monitor strict I/O's     - BP, HR stable and controlled per flow sheet   - continue to monitor routine hemodynamics  - Monitor and replete Lytes. Keep K > 4 and Mg > 2    - Will continue to follow.    YONATAN AshfordKHLOE  Nurse Practitioner - Cardiology   call TEAMS 89-year-old female with history of hypertension, mitral valve prolapse, osteoporosis, dementia, coronary artery disease, diverticulosis, hyperlipidemia, hyperparathyroidism, anal prolapse sent to the emergency department at [facility] today for questionable declining mental status and elevated heart rate today, a/f sepsis sec to suspected sterco colitis and uti, metabolic encephalopathy, and napoleon     CAD, VHD, AMS  - p/w  AMS, likely secondary for worsening dementia, tachycardia. likely in  the setting of infectious process, dehydration, sepsis (met criteria) work up ongoing   - CT Chest: stercoral proctitis , small subpleural opacity Right Lung middle lobe may be infectious or inflammatory   - ID following, rec GI consult     - known hx of CAD, follows Dr Ogden (o/p cardiologist)   - EKG: ST with RBBB, nonspecific ST/ TWA, unchanged from prior  - no anginal complaints     - TTE  9/26/2022:  EF 63%, mod AS, severely dilated LA, mild DD  - may obtain routine TTE while admitted to assess  AS, however might not    - no sign of volume overload, b/l LE edema (chronic)   - hypernatremic Na: 158-> 155, on IVF, continue to trend   - monitor strict I/O's     - BP, HR stable and controlled per flow sheet   - continue to monitor routine hemodynamics  - Monitor and replete Lytes. Keep K > 4 and Mg > 2    - Will continue to follow.    YONATAN AshfordKHLOE  Nurse Practitioner - Cardiology   call TEAMS 89-year-old female with history of hypertension, mitral valve prolapse, osteoporosis, dementia, coronary artery disease, diverticulosis, hyperlipidemia, hyperparathyroidism, anal prolapse sent to the emergency department at [facility] today for questionable declining mental status and elevated heart rate today, a/f sepsis sec to suspected sterco colitis and uti, metabolic encephalopathy, and napoleon     CAD, VHD, AMS  - p/w  AMS with tachycardia.   - tachy likely 2/2 infectious process, dehydration, sepsis (met criteria) work up ongoing   - CT Chest: stercoral proctitis , small subpleural opacity Right Lung middle lobe may be infectious or inflammatory   - ID following, rec GI consult     - known hx of CAD, follows Dr Ogden (o/p cardiologist)   - EKG: ST with RBBB, nonspecific ST/ TWA, unchanged from prior  - no anginal complaints     - TTE  9/26/2022:  EF 63%, mod AS, severely dilated LA, mild DD  - may obtain routine TTE while admitted to assess  AS, however pt is not a candidate for any aggressive interventions.   - no sign of volume overload, b/l LE edema (chronic)   - hypernatremic Na: 158-> 155, on IVF, continue to trend   - monitor strict I/O's     - BP, HR stable and controlled per flow sheet   - continue to monitor routine hemodynamics  - Monitor and replete Lytes. Keep K > 4 and Mg > 2    - Will continue to follow.    MERCY Ashford  Nurse Practitioner - Cardiology   call TEAMS

## 2023-09-15 NOTE — CARE COORDINATION ASSESSMENT. - NSCAREPROVIDERS_GEN_ALL_CORE_FT
CARE PROVIDERS:  Accepting Physician: Daryn Vogel  Administration: Silvestre Silva  Administration: Daryn Vogel  Admitting: Daryn Vogel  Attending: Daryn Vogel  Case Management: Jennifer Aponte  Case Management: Rickie Gibson  Consultant: Mita Perez Team: Fei Sesay ED Attending: Chun Okeefe ED Attending2: Sarah Beth Hood ED Nurse: So Manriquez  Nurse: Jenn Alamo  Nurse: Bella Naylor  Nurse: Perla Fontanez  Ordered: ADM, User  Ordered: Pooja, Hugo  Outpatient Provider: Paras Ogden  Override: Yancy Cheung  Override: Jenn Alamo  PCA/Nursing Assistant: Vero Odom  Physical Therapy: Meseret Munoz  Physical Therapy: Jayashree Stone  Physical Therapy: Maya Lim  Primary Team: Zoila Rivera  Primary Team: Shankar Kirk  Registered Dietitian: Lisa Crisostomo  Speech Pathology: Marcia Rich  Student: Gagandeep Membreno

## 2023-09-15 NOTE — CARE COORDINATION ASSESSMENT. - OTHER PERTINENT DISCHARGE PLANNING INFORMATION:
Met with patient and daughter at bedside to discuss the role of case management with verbalized understanding.  Needs unclear at present.  Daughter Elvira [phone] is currently requesting hospice eval.  Patient from [facility] [phone].  Spoke with Ac at [facility].  Patient will need short form, or [phone] to return faxed to [phone].  Call placed again to Ac at [facility] to determine which hospice they participate with.  Will remain available from a case management perspective.

## 2023-09-15 NOTE — CONSULT NOTE ADULT - SUBJECTIVE AND OBJECTIVE BOX
DOCUMENTATION IN PROGRESS    CHIEF COMPLAINT: Patient is a 89y old  Female who presents with a chief complaint of Altered mental status (15 Sep 2023 11:00)      HPI:  89-year-old female with history of hypertension, mitral valve prolapse, osteoporosis, dementia, coronary artery disease, diverticulosis, hyperlipidemia, hyperparathyroidism, anal prolapse sent to the emergency department at [facility] today for questionable declining mental status and elevated heart rate today. Hx elicited from daughter Elvira who is also the health care proxy. Daughter states that last Tuesday pt fell 2/2 dehydration at the assisted living facility and has been declining ever since. Prior to this hospital visit, pt did not require a walker and knew her name and family members; however, now requires a walker and is A&Ox0. Today pt was sent into the ED for increasing lethargy.       ED course  Vitals: T 100.5, , /81, RR 18, SpO2 97% on RA  Significant labs: WBC 12.17, Na 156, BUN/Cr 43/1.7, lactate 1.4  RVP neg  UA shows moderate leuk esterase, WBC 21, few bacteria, +squamous cells  Imaging:   CT head shows Motion limited examination. Age-appropriate involutional changes. No acute intracranial pathology. Specifically, no acute intracranial hemorrhage.  CT chest/Abdomen pelvis shows Stercoral proctitis. Small subpleural opacity right lung middle lobe may be infectious or inflammatory.  EKG: Sinus Tach 117, RBBB; similar to previous   In ED patient given: 1L LR bolus x1, ofirmev x1, Rocephin 1g x1, Azithro 500mg x1    (14 Sep 2023 20:29)      EKG: , with RBBB, non specific ST and TWA     REVIEW OF SYSTEMS:   All other review of systems are negative unless indicated above    PAST MEDICAL & SURGICAL HISTORY:  History of Hypertension      Age Related Osteoporosis      Heart murmur      Otosclerosis      Fungal infection  left foot ; tx with cream ; x 3 weeks ; pt reports improvement      MVP (mitral valve prolapse)      [place] (hard of hearing)      Anal prolapse      H/O:  Section  7, 0,       LIH Repair      Right Stapedectomy        Left Stapedectomy        Right  Cataract  Excision        Left Cataract        Surgical Treatment Otosclerosis   ? "Mobilization of Stapes"      Otosclerosis of right ear  S/P Stapedectomy- 10/12/1978      Otosclerosis of left ear  stapedectomy-       S/P hernia repair  ventral hernia with mesh- 10/2012      S/P left inguinal hernia repair  with mesh       S/P colonoscopy with polypectomy  times 2          SOCIAL HISTORY:  No tobacco, ethanol, or drug abuse.    FAMILY HISTORY:    No family history of acute MI or sudden cardiac death.    MEDICATIONS  (STANDING):  azithromycin  IVPB 500 milliGRAM(s) IV Intermittent every 24 hours  cefTRIAXone   IVPB 1000 milliGRAM(s) IV Intermittent every 24 hours  cholecalciferol 2000 Unit(s) Oral daily  dextrose 5%. 1000 milliLiter(s) (60 mL/Hr) IV Continuous <Continuous>  heparin   Injectable 5000 Unit(s) SubCutaneous every 12 hours  polyethylene glycol 3350 17 Gram(s) Oral daily  potassium chloride    Tablet ER 20 milliEquivalent(s) Oral daily  risperiDONE   Tablet 0.25 milliGRAM(s) Oral two times a day    MEDICATIONS  (PRN):  acetaminophen     Tablet .. 650 milliGRAM(s) Oral every 6 hours PRN Temp greater or equal to 38C (100.4F)      Allergies    Shrimp (Unknown)  penicillins (Hives)  Aricept (Unknown)  Onions (Unknown)  hydrochlorothiazide (Faint)  sertraline (Unknown)  amoxicillin (Unknown)  rivastigmine (Unknown)    Intolerances        Home meds:  Home Medications:  Centrum Silver oral tablet: 1 tab(s) orally once a day (14 Sep 2023 20:13)  cholecalciferol 50 mcg (2000 intl units) oral tablet: 1 tab(s) orally once a day (14 Sep 2023 20:13)  furosemide 40 mg oral tablet: 1 tab(s) orally once a day (14 Sep 2023 18:47)  ketoconazole 2% topical cream: Apply topically to affected area 2 times a day both feet (14 Sep 2023 20:13)  polyethylene glycol 3350 oral powder for reconstitution: 17 gram(s) orally once a day (14 Sep 2023 20:13)  Potassium Chloride (Eqv-Klor-Con 10) 10 mEq oral tablet, extended release: 2 tab(s) orally once a day (14 Sep 2023 20:13)  PreserVision AREDS 2 oral capsule: 1 cap(s) orally once a day (14 Sep 2023 20:13)  risperiDONE 0.25 mg oral tablet: 1 tab(s) orally 2 times a day (14 Sep 2023 20:13)        VITAL SIGNS:   Vital Signs Last 24 Hrs  T(C): 36.6 (15 Sep 2023 05:32), Max: 38.1 (14 Sep 2023 17:47)  T(F): 97.8 (15 Sep 2023 05:32), Max: 100.5 (14 Sep 2023 17:47)  HR: 92 (15 Sep 2023 05:32) (92 - 110)  BP: 118/80 (15 Sep 2023 05:32) (113/81 - 121/84)  BP(mean): --  RR: 18 (15 Sep 2023 05:32) (18 - 18)  SpO2: 100% (15 Sep 2023 05:32) (94% - 100%)    Parameters below as of 15 Sep 2023 05:32  Patient On (Oxygen Delivery Method): room air        I&O's Summary      On Exam:  TELE:   Constitutional: NAD, awake and alert, well-developed  HEENT: Moist Mucous Membranes, Anicteric  Pulmonary: Non-labored, breath sounds are clear bilaterally, No wheezing, rales or rhonchi  Cardiovascular: Regular, S1 and S2,+ sys murmurs, rubs, gallops or clicks  Gastrointestinal: Bowel Sounds present, soft, nontender.   Lymph: No peripheral edema. No lymphadenopathy.  Skin: No visible rashes or ulcers.  Psych:  Mood & affect appropriate for situation    LABS: All Labs Reviewed:                        13.4   9.36  )-----------( 155      ( 15 Sep 2023 08:20 )             40.3                         14.2   12.17 )-----------( 152      ( 14 Sep 2023 15:55 )             43.5     15 Sep 2023 08:20    155    |  124    |  31     ----------------------------<  100    3.2     |  28     |  1.40   14 Sep 2023 15:55    156    |  123    |  43     ----------------------------<  106    3.7     |  29     |  1.70     Ca    11.8       15 Sep 2023 08:20  Ca    12.7       14 Sep 2023 15:55    TPro  6.9    /  Alb  2.7    /  TBili  0.7    /  DBili  x      /  AST  25     /  ALT  32     /  AlkPhos  78     15 Sep 2023 08:20  TPro  7.6    /  Alb  3.1    /  TBili  0.8    /  DBili  x      /  AST  24     /  ALT  27     /  AlkPhos  79     14 Sep 2023 15:55    PT/INR - ( 14 Sep 2023 15:55 )   PT: 12.6 sec;   INR: 1.08 ratio         PTT - ( 14 Sep 2023 15:55 )  PTT:26.1 sec      Blood Culture:         RADIOLOGY:              CHIEF COMPLAINT: Patient is a 89y old  Female who presents with a chief complaint of Altered mental status (15 Sep 2023 11:00)    HPI:  89-year-old female with history of hypertension, mitral valve prolapse, osteoporosis, dementia, coronary artery disease, diverticulosis, hyperlipidemia, hyperparathyroidism, anal prolapse sent to the emergency department at [facility] today for questionable declining mental status and elevated heart rate today. Hx elicited from daughter Elvira who is also the health care proxy. Daughter states that last Tuesday pt fell 2/2 dehydration at the assisted living facility and has been declining ever since. Prior to this hospital visit, pt did not require a walker and knew her name and family members; however, now requires a walker and is A&Ox0. Today pt was sent into the ED for increasing lethargy.   Cardiology consulted for hx of CAD, VHD, patient seen and examined in room 107d, asleep in bed, unable to provide meaningful information at this time, NAD, laying comfortably in bed, on room Air     ED course  Vitals: T 100.5, , /81, RR 18, SpO2 97% on RA  Significant labs: WBC 12.17, Na 156, BUN/Cr 43/1.7, lactate 1.4  RVP neg  UA shows moderate leuk esterase, WBC 21, few bacteria, +squamous cells  Imaging:   CT head shows Motion limited examination. Age-appropriate involutional changes. No acute intracranial pathology. Specifically, no acute intracranial hemorrhage.  CT chest/Abdomen pelvis shows Stercoral proctitis. Small subpleural opacity right lung middle lobe may be infectious or inflammatory.  EKG: Sinus Tach 117, RBBB; similar to previous   In ED patient given: 1L LR bolus x1, ofirmev x1, Rocephin 1g x1, Azithro 500mg x1    (14 Sep 2023 20:29)      EKG: , with RBBB, non specific ST and TWA     REVIEW OF SYSTEMS:   All other review of systems are negative unless indicated above    PAST MEDICAL & SURGICAL HISTORY:  History of Hypertension      Age Related Osteoporosis      Heart murmur      Otosclerosis      Fungal infection  left foot ; tx with cream ; x 3 weeks ; pt reports improvement      MVP (mitral valve prolapse)      [place] (hard of hearing)      Anal prolapse      H/O:  Section  , 0,       LIH Repair      Right Stapedectomy        Left Stapedectomy        Right  Cataract  Excision        Left Cataract        Surgical Treatment Otosclerosis  1955 ? "Mobilization of Stapes"      Otosclerosis of right ear  S/P Stapedectomy- 10/12/1978      Otosclerosis of left ear  stapedectomy-       S/P hernia repair  ventral hernia with mesh- 10/2012      S/P left inguinal hernia repair  with mesh       S/P colonoscopy with polypectomy  times 2          SOCIAL HISTORY:  No tobacco, ethanol, or drug abuse.    FAMILY HISTORY:    No family history of acute MI or sudden cardiac death.    MEDICATIONS  (STANDING):  azithromycin  IVPB 500 milliGRAM(s) IV Intermittent every 24 hours  cefTRIAXone   IVPB 1000 milliGRAM(s) IV Intermittent every 24 hours  cholecalciferol 2000 Unit(s) Oral daily  dextrose 5%. 1000 milliLiter(s) (60 mL/Hr) IV Continuous <Continuous>  heparin   Injectable 5000 Unit(s) SubCutaneous every 12 hours  polyethylene glycol 3350 17 Gram(s) Oral daily  potassium chloride    Tablet ER 20 milliEquivalent(s) Oral daily  risperiDONE   Tablet 0.25 milliGRAM(s) Oral two times a day    MEDICATIONS  (PRN):  acetaminophen     Tablet .. 650 milliGRAM(s) Oral every 6 hours PRN Temp greater or equal to 38C (100.4F)      Allergies    Shrimp (Unknown)  penicillins (Hives)  Aricept (Unknown)  Onions (Unknown)  hydrochlorothiazide (Faint)  sertraline (Unknown)  amoxicillin (Unknown)  rivastigmine (Unknown)    Intolerances        Home meds:  Home Medications:  Centrum Silver oral tablet: 1 tab(s) orally once a day (14 Sep 2023 20:13)  cholecalciferol 50 mcg (2000 intl units) oral tablet: 1 tab(s) orally once a day (14 Sep 2023 20:13)  furosemide 40 mg oral tablet: 1 tab(s) orally once a day (14 Sep 2023 18:47)  ketoconazole 2% topical cream: Apply topically to affected area 2 times a day both feet (14 Sep 2023 20:13)  polyethylene glycol 3350 oral powder for reconstitution: 17 gram(s) orally once a day (14 Sep 2023 20:13)  Potassium Chloride (Eqv-Klor-Con 10) 10 mEq oral tablet, extended release: 2 tab(s) orally once a day (14 Sep 2023 20:13)  PreserVision AREDS 2 oral capsule: 1 cap(s) orally once a day (14 Sep 2023 20:13)  risperiDONE 0.25 mg oral tablet: 1 tab(s) orally 2 times a day (14 Sep 2023 20:13)        VITAL SIGNS:   Vital Signs Last 24 Hrs  T(C): 36.6 (15 Sep 2023 05:32), Max: 38.1 (14 Sep 2023 17:47)  T(F): 97.8 (15 Sep 2023 05:32), Max: 100.5 (14 Sep 2023 17:47)  HR: 92 (15 Sep 2023 05:32) (92 - 110)  BP: 118/80 (15 Sep 2023 05:32) (113/81 - 121/84)  BP(mean): --  RR: 18 (15 Sep 2023 05:32) (18 - 18)  SpO2: 100% (15 Sep 2023 05:32) (94% - 100%)    Parameters below as of 15 Sep 2023 05:32  Patient On (Oxygen Delivery Method): room air        I&O's Summary      On Exam:  TELE: Not on telemetry   Constitutional: NAD, awake and alert, frail   HEENT: Moist Mucous Membranes, Anicteric  Pulmonary: Non-labored, breath sounds are clear bilaterally, No wheezing, rales or rhonchi  Cardiovascular: Regular, S1 and S2,+ sys murmurs, rubs, gallops or clicks  Gastrointestinal: Bowel Sounds present, soft, nontender.   Lymph: No peripheral edema. No lymphadenopathy.  Skin: No visible rashes or ulcers.  Psych: unable to assess, asleep, hx of dementia   LABS: All Labs Reviewed:                        13.4   9.36  )-----------( 155      ( 15 Sep 2023 08:20 )             40.3                         14.2   12.17 )-----------( 152      ( 14 Sep 2023 15:55 )             43.5     15 Sep 2023 08:20    155    |  124    |  31     ----------------------------<  100    3.2     |  28     |  1.40   14 Sep 2023 15:55    156    |  123    |  43     ----------------------------<  106    3.7     |  29     |  1.70     Ca    11.8       15 Sep 2023 08:20  Ca    12.7       14 Sep 2023 15:55    TPro  6.9    /  Alb  2.7    /  TBili  0.7    /  DBili  x      /  AST  25     /  ALT  32     /  AlkPhos  78     15 Sep 2023 08:20  TPro  7.6    /  Alb  3.1    /  TBili  0.8    /  DBili  x      /  AST  24     /  ALT  27     /  AlkPhos  79     14 Sep 2023 15:55    PT/INR - ( 14 Sep 2023 15:55 )   PT: 12.6 sec;   INR: 1.08 ratio         PTT - ( 14 Sep 2023 15:55 )  PTT:26.1 sec      Blood Culture:         RADIOLOGY:              CHIEF COMPLAINT: Patient is a 89y old  Female who presents with a chief complaint of Altered mental status (15 Sep 2023 11:00)    HPI:  89-year-old female with history of hypertension, mitral valve prolapse, osteoporosis, dementia, coronary artery disease, diverticulosis, hyperlipidemia, hyperparathyroidism, anal prolapse sent to the emergency department at [facility] today for questionable declining mental status and elevated heart rate today. Hx elicited from daughter Elvira who is also the health care proxy. Daughter states that last Tuesday pt fell 2/2 dehydration at the assisted living facility and has been declining ever since. Prior to this hospital visit, pt did not require a walker and knew her name and family members; however, now requires a walker and is A&Ox0. Today pt was sent into the ED for increasing lethargy.   Cardiology consulted for hx of CAD, VHD, patient seen and examined in room 107d, asleep in bed, unable to provide meaningful information at this time, NAD, laying comfortably in bed, on room Air     ED course  Vitals: T 100.5, , /81, RR 18, SpO2 97% on RA  Significant labs: WBC 12.17, Na 156, BUN/Cr 43/1.7, lactate 1.4  RVP neg  UA shows moderate leuk esterase, WBC 21, few bacteria, +squamous cells  Imaging:   CT head shows Motion limited examination. Age-appropriate involutional changes. No acute intracranial pathology. Specifically, no acute intracranial hemorrhage.  CT chest/Abdomen pelvis shows Stercoral proctitis. Small subpleural opacity right lung middle lobe may be infectious or inflammatory.  EKG: Sinus Tach 117, RBBB; similar to previous   In ED patient given: 1L LR bolus x1, ofirmev x1, Rocephin 1g x1, Azithro 500mg x1    (14 Sep 2023 20:29)      EKG: , with RBBB, non specific ST and TWA     REVIEW OF SYSTEMS:   All other review of systems are negative unless indicated above    PAST MEDICAL & SURGICAL HISTORY:  History of Hypertension      Age Related Osteoporosis      Heart murmur      Otosclerosis      Fungal infection  left foot ; tx with cream ; x 3 weeks ; pt reports improvement      MVP (mitral valve prolapse)      [place] (hard of hearing)      Anal prolapse      H/O:  Section  , 0,       LIH Repair      Right Stapedectomy        Left Stapedectomy        Right  Cataract  Excision        Left Cataract        Surgical Treatment Otosclerosis  1955 ? "Mobilization of Stapes"      Otosclerosis of right ear  S/P Stapedectomy- 10/12/1978      Otosclerosis of left ear  stapedectomy-       S/P hernia repair  ventral hernia with mesh- 10/2012      S/P left inguinal hernia repair  with mesh       S/P colonoscopy with polypectomy  times 2          SOCIAL HISTORY:  No tobacco, ethanol, or drug abuse.    FAMILY HISTORY:    No family history of acute MI or sudden cardiac death.    MEDICATIONS  (STANDING):  azithromycin  IVPB 500 milliGRAM(s) IV Intermittent every 24 hours  cefTRIAXone   IVPB 1000 milliGRAM(s) IV Intermittent every 24 hours  cholecalciferol 2000 Unit(s) Oral daily  dextrose 5%. 1000 milliLiter(s) (60 mL/Hr) IV Continuous <Continuous>  heparin   Injectable 5000 Unit(s) SubCutaneous every 12 hours  polyethylene glycol 3350 17 Gram(s) Oral daily  potassium chloride    Tablet ER 20 milliEquivalent(s) Oral daily  risperiDONE   Tablet 0.25 milliGRAM(s) Oral two times a day    MEDICATIONS  (PRN):  acetaminophen     Tablet .. 650 milliGRAM(s) Oral every 6 hours PRN Temp greater or equal to 38C (100.4F)      Allergies    Shrimp (Unknown)  penicillins (Hives)  Aricept (Unknown)  Onions (Unknown)  hydrochlorothiazide (Faint)  sertraline (Unknown)  amoxicillin (Unknown)  rivastigmine (Unknown)    Intolerances        Home meds:  Home Medications:  Centrum Silver oral tablet: 1 tab(s) orally once a day (14 Sep 2023 20:13)  cholecalciferol 50 mcg (2000 intl units) oral tablet: 1 tab(s) orally once a day (14 Sep 2023 20:13)  furosemide 40 mg oral tablet: 1 tab(s) orally once a day (14 Sep 2023 18:47)  ketoconazole 2% topical cream: Apply topically to affected area 2 times a day both feet (14 Sep 2023 20:13)  polyethylene glycol 3350 oral powder for reconstitution: 17 gram(s) orally once a day (14 Sep 2023 20:13)  Potassium Chloride (Eqv-Klor-Con 10) 10 mEq oral tablet, extended release: 2 tab(s) orally once a day (14 Sep 2023 20:13)  PreserVision AREDS 2 oral capsule: 1 cap(s) orally once a day (14 Sep 2023 20:13)  risperiDONE 0.25 mg oral tablet: 1 tab(s) orally 2 times a day (14 Sep 2023 20:13)        VITAL SIGNS:   Vital Signs Last 24 Hrs  T(C): 36.6 (15 Sep 2023 05:32), Max: 38.1 (14 Sep 2023 17:47)  T(F): 97.8 (15 Sep 2023 05:32), Max: 100.5 (14 Sep 2023 17:47)  HR: 92 (15 Sep 2023 05:32) (92 - 110)  BP: 118/80 (15 Sep 2023 05:32) (113/81 - 121/84)  BP(mean): --  RR: 18 (15 Sep 2023 05:32) (18 - 18)  SpO2: 100% (15 Sep 2023 05:32) (94% - 100%)    Parameters below as of 15 Sep 2023 05:32  Patient On (Oxygen Delivery Method): room air        I&O's Summary      On Exam:  TELE: Not on telemetry   Constitutional: NAD, awake , frail   HEENT: Moist Mucous Membranes, Anicteric  Pulmonary: Non-labored, breath sounds are clear bilaterally, No wheezing, rales or rhonchi  Cardiovascular: Regular, S1 and dimished S2 +3/6 SM  Gastrointestinal: Bowel Sounds present, soft, nontender.   Lymph: No peripheral edema. No lymphadenopathy.  Skin: No visible rashes or ulcers.  Psych: unable to assess, asleep, hx of dementia   LABS: All Labs Reviewed:                        13.4   9.36  )-----------( 155      ( 15 Sep 2023 08:20 )             40.3                         14.2   12.17 )-----------( 152      ( 14 Sep 2023 15:55 )             43.5     15 Sep 2023 08:20    155    |  124    |  31     ----------------------------<  100    3.2     |  28     |  1.40   14 Sep 2023 15:55    156    |  123    |  43     ----------------------------<  106    3.7     |  29     |  1.70     Ca    11.8       15 Sep 2023 08:20  Ca    12.7       14 Sep 2023 15:55    TPro  6.9    /  Alb  2.7    /  TBili  0.7    /  DBili  x      /  AST  25     /  ALT  32     /  AlkPhos  78     15 Sep 2023 08:20  TPro  7.6    /  Alb  3.1    /  TBili  0.8    /  DBili  x      /  AST  24     /  ALT  27     /  AlkPhos  79     14 Sep 2023 15:55    PT/INR - ( 14 Sep 2023 15:55 )   PT: 12.6 sec;   INR: 1.08 ratio         PTT - ( 14 Sep 2023 15:55 )  PTT:26.1 sec      Blood Culture:         RADIOLOGY:

## 2023-09-15 NOTE — PROGRESS NOTE ADULT - ASSESSMENT
89-year-old female with history of hypertension, mitral valve prolapse, osteoporosis, dementia, coronary artery disease, diverticulosis, hyperlipidemia, hyperparathyroidism, anal prolapse sent to the emergency department at [facility] today for questionable declining mental status and elevated heart rate today. Admitted for sepsis sec to suspected sterco colitis and uti, metabolic encephalopathy, and keely            Problem/Plan - 1:  ·  Problem: Acute encephalopathy.   ·  Plan: Pt presents to the ED for altered mental status at assisted living facility  - likely secondary to worsening dementia vs acute infectious process   - Pt meets Sepsis criteria on admission (T100.5, , WBC >12, source)   - s/p 1L LR bolus x1, ofirmev x1, Rocephin 1g x1, Azithro 500mg x1   - CT head shows Motion limited examination. Age-appropriate involutional changes. No acute intracranial pathology. Specifically, no acute intracranial hemorrhage.  - CT chest/Abdomen pelvis shows Stercoral proctitis. Small subpleural opacity right lung middle lobe may be infectious or inflammatory.  -  RVP negative  - UA shows moderate leuk esterase, WBC 21, few bacteria, +squamous cells   - f/u Urine culture, strep, legionella   - f/u Blood Cx x2   - f/u repeat lactate   - f/u procal  - tylenol prn for fever   - continue rocephin and azithro  -bowel regimen for stercocolitis, consider GI recs if not improving  - f/u speech and swallow    - ID consulted, Dr. Perez, f/u recs.     Problem/Plan - 2:  ·  Problem: Hypernatremia.   ·  Plan: Na 156  - likely 2/2 decreased PO intake/dehydration  - start D5 60cc/hr for 6 hrs   - consider nephro consult if Na decreases too quickly or hypernatremia does not resolve.     Problem/Plan - 3:  ·  Problem: KEELY (acute kidney injury).   ·  Plan: Cr 1.7 on admission   - likely pre-renal in setting of decreased PO intake/dehydration   - continue D5 60cc/hr.     Problem/Plan - 4:  ·  Problem: HTN (hypertension).   ·  Plan: Chronic   - no longer on home losartan   - monitor hemodynamics.     Problem/Plan - 5:  ·  Problem: Dementia.   ·  Plan: Chronic   - continue home risperidone.     Problem/Plan - 6:  ·  Problem: Swelling of lower extremity.   ·  Plan: Chronic  - HOLD home lasix 40mg qd in setting of hypernatremia/KEELY  - follows with Dr. Ogden   - Echocardiography  9/26/2022 revealed severe left atrial enlargement the remainder of the cardiac chambers were normal in dimension. Mild concentric left ventricular hypertrophy was noted. Left ventricular systolic function was normal, with an estimated ejection fraction of 60 to 65%.     Problem/Plan - 7:  ·  Problem: Need for prophylactic measure.   ·  Plan: - Heparin 5000 units q12hrs.    no vital checks, no labs daily asper daughter, cont abx for now

## 2023-09-15 NOTE — CARE COORDINATION ASSESSMENT. - NSPASTMEDSURGHISTORY_GEN_ALL_CORE_FT
PAST MEDICAL & SURGICAL HISTORY:  Age Related Osteoporosis      History of Hypertension      Fungal infection  left foot ; tx with cream ; x 3 weeks ; pt reports improvement      Otosclerosis      Heart murmur      Surgical Treatment Otosclerosis   ? "Mobilization of Stapes"      Left Cataract        Right  Cataract  Excision        Left Stapedectomy        Right Stapedectomy        LIH Repair      H/O:  Section  , , 1963      [place] (hard of hearing)      MVP (mitral valve prolapse)      S/P colonoscopy with polypectomy  times 2      S/P left inguinal hernia repair  with mesh       S/P hernia repair  ventral hernia with mesh- 10/2012      Otosclerosis of left ear  stapedectomy-       Otosclerosis of right ear  S/P Stapedectomy- 10/12/1978      Anal prolapse

## 2023-09-15 NOTE — SWALLOW BEDSIDE ASSESSMENT ADULT - COMMENTS
H&P: 89-year-old female with history of hypertension, mitral valve prolapse, osteoporosis, dementia, coronary artery disease, diverticulosis, hyperlipidemia, hyperparathyroidism, anal prolapse sent to the emergency department at [facility] today for questionable declining mental status and elevated heart rate today. Admitted for sepsis sec to suspected sterco colitis and uti, metabolic encephalopathy, and [place]     CXR 9/5: Slight increase in interstitial markings on the right     Pt received asleep but easily aroused to verbal and tactile stimuli. Pt with baseline confusion and nonsensical speech 2/2 dementia per chart. Unable to obtain baseline diet information from patient.

## 2023-09-16 PROCEDURE — 99233 SBSQ HOSP IP/OBS HIGH 50: CPT

## 2023-09-16 PROCEDURE — 99232 SBSQ HOSP IP/OBS MODERATE 35: CPT

## 2023-09-16 RX ADMIN — HEPARIN SODIUM 5000 UNIT(S): 5000 INJECTION INTRAVENOUS; SUBCUTANEOUS at 17:32

## 2023-09-16 RX ADMIN — Medication 2000 UNIT(S): at 12:21

## 2023-09-16 RX ADMIN — CEFTRIAXONE 100 MILLIGRAM(S): 500 INJECTION, POWDER, FOR SOLUTION INTRAMUSCULAR; INTRAVENOUS at 17:33

## 2023-09-16 RX ADMIN — RISPERIDONE 0.25 MILLIGRAM(S): 4 TABLET ORAL at 05:33

## 2023-09-16 RX ADMIN — HEPARIN SODIUM 5000 UNIT(S): 5000 INJECTION INTRAVENOUS; SUBCUTANEOUS at 05:34

## 2023-09-16 RX ADMIN — RISPERIDONE 0.25 MILLIGRAM(S): 4 TABLET ORAL at 17:31

## 2023-09-16 RX ADMIN — POLYETHYLENE GLYCOL 3350 17 GRAM(S): 17 POWDER, FOR SOLUTION ORAL at 12:22

## 2023-09-16 RX ADMIN — Medication 20 MILLIEQUIVALENT(S): at 12:22

## 2023-09-16 NOTE — PROGRESS NOTE ADULT - SUBJECTIVE AND OBJECTIVE BOX
[facility] -- Mili Ogden Pannella, Patel, Savella, Goodger, Cohen  Office # [phone]    Follow Up:  weakness, tachycardia     Subjective/Observations: seen and examined, more awake and interactive, confused, unable to provide meaningful information at this time, NAD. Tolerating room air. IVF infusing     REVIEW OF SYSTEMS: All other review of systems is negative unless indicated above  PAST MEDICAL & SURGICAL HISTORY:  History of Hypertension      Age Related Osteoporosis      Heart murmur      Otosclerosis      Fungal infection  left foot ; tx with cream ; x 3 weeks ; pt reports improvement      MVP (mitral valve prolapse)      [place] (hard of hearing)      Anal prolapse      H/O:  Section  , 0,       LIH Repair      Right Stapedectomy        Left Stapedectomy        Right  Cataract  Excision        Left Cataract        Surgical Treatment Otosclerosis   ? "Mobilization of Stapes"      Otosclerosis of right ear  S/P Stapedectomy- 10/12/1978      Otosclerosis of left ear  stapedectomy-       S/P hernia repair  ventral hernia with mesh- 10/2012      S/P left inguinal hernia repair  with mesh       S/P colonoscopy with polypectomy  times 2        MEDICATIONS  (STANDING):  azithromycin  IVPB 500 milliGRAM(s) IV Intermittent every 24 hours  cefTRIAXone   IVPB 1000 milliGRAM(s) IV Intermittent every 24 hours  cholecalciferol 2000 Unit(s) Oral daily  dextrose 5%. 1000 milliLiter(s) (60 mL/Hr) IV Continuous <Continuous>  heparin   Injectable 5000 Unit(s) SubCutaneous every 12 hours  polyethylene glycol 3350 17 Gram(s) Oral daily  potassium chloride    Tablet ER 20 milliEquivalent(s) Oral daily  risperiDONE   Tablet 0.25 milliGRAM(s) Oral two times a day    MEDICATIONS  (PRN):  acetaminophen     Tablet .. 650 milliGRAM(s) Oral every 6 hours PRN Temp greater or equal to 38C (100.4F)    Allergies    Shrimp (Unknown)  penicillins (Hives)  Aricept (Unknown)  Onions (Unknown)  hydrochlorothiazide (Faint)  sertraline (Unknown)  amoxicillin (Unknown)  rivastigmine (Unknown)    Intolerances      Vital Signs Last 24 Hrs  T(C): 36.7 (15 Sep 2023 20:06), Max: 36.7 (15 Sep 2023 20:06)  T(F): 98 (15 Sep 2023 20:06), Max: 98 (15 Sep 2023 20:06)  HR: 86 (15 Sep 2023 20:06) (86 - 86)  BP: 133/81 (15 Sep 2023 20:06) (133/81 - 133/81)  BP(mean): --  RR: 19 (15 Sep 2023 20:06) (19 - 19)  SpO2: 96% (15 Sep 2023 20:06) (96% - 96%)    Parameters below as of 15 Sep 2023 20:06  Patient On (Oxygen Delivery Method): room air      I&O's Summary        TELE: Not on telemetry   PHYSICAL EXAM:  Constitutional: NAD, awake  HEENT: Moist Mucous Membranes, Anicteric  Pulmonary: Non-labored, breath sounds are clear bilaterally, No wheezing, rales or rhonchi  Cardiovascular: IRRRegular, S1 and S2, +3/6 murmurs, rubs, gallops or clicks  Gastrointestinal: Bowel Sounds present, soft, nontender.   Lymph: No peripheral edema. No lymphadenopathy.  Skin: No visible rashes or ulcers.  Psych: confused  LABS: All Labs Reviewed:                        13.4   9.36  )-----------( 155      ( 15 Sep 2023 08:20 )             40.3                         14.2   12.17 )-----------( 152      ( 14 Sep 2023 15:55 )             43.5     15 Sep 2023 08:20    155    |  124    |  31     ----------------------------<  100    3.2     |  28     |  1.40   14 Sep 2023 15:55    156    |  123    |  43     ----------------------------<  106    3.7     |  29     |  1.70     Ca    11.8       15 Sep 2023 08:20  Ca    12.7       14 Sep 2023 15:55    TPro  6.9    /  Alb  2.7    /  TBili  0.7    /  DBili  x      /  AST  25     /  ALT  32     /  AlkPhos  78     15 Sep 2023 08:20  TPro  7.6    /  Alb  3.1    /  TBili  0.8    /  DBili  x      /  AST  24     /  ALT  27     /  AlkPhos  79     14 Sep 2023 15:55    PT/INR - ( 14 Sep 2023 15:55 )   PT: 12.6 sec;   INR: 1.08 ratio         PTT - ( 14 Sep 2023 15:55 )  PTT:26.1 sec      12 Lead ECG:   Ventricular Rate 117 BPM    Atrial Rate 117 BPM    P-R Interval 152 ms    QRS Duration 114 ms    Q-T Interval 330 ms    QTC Calculation(Bazett) 460 ms    P Axis 27 degrees    R Axis -80 degrees    T Axis 32 degrees    Diagnosis Line *** Poor data quality, interpretation may be adversely affected  Sinus tachycardia  Right bundle branch block  Left anterior fascicular block  *** Bifascicular block ***  Minimal voltage criteria for LVH, may be normal variant ( R in aVL )  Abnormal ECG  When compared with ECG of 05-SEP-2023 09:37,  No significant change was found  Confirmed by TERRANCE CYR ([id number]) on 9/15/2023 6:04:34 PM (23 @ 13:47)

## 2023-09-16 NOTE — PHYSICAL THERAPY INITIAL EVALUATION ADULT - PERTINENT HX OF CURRENT PROBLEM, REHAB EVAL
89-year-old female with history of hypertension, mitral valve prolapse, osteoporosis, dementia, coronary artery disease, diverticulosis, hyperlipidemia, hyperparathyroidism, anal prolapse sent to the emergency department at [facility] today for questionable declining mental status and elevated heart rate today. Admitted for sepsis sec to suspected sterco colitis and uti, metabolic encephalopathy, and napoleon

## 2023-09-16 NOTE — SOCIAL WORK PROGRESS NOTE - NSSWPROGRESSNOTE_GEN_ALL_CORE
SW met with pts dtr to discuss referral to [facility]. SW made referral yesterday. They have been in touch with dtr, TODD LORENZ for wellness office at the [facility]. Await return call.

## 2023-09-16 NOTE — PHYSICAL THERAPY INITIAL EVALUATION ADULT - NSPTDISCHREC_GEN_A_CORE
pending hospice eval per CM notes. Patient total assist for all mobility, would require sandip lift for OOB mobility at this time.

## 2023-09-16 NOTE — PROGRESS NOTE ADULT - ASSESSMENT
89-year-old female with history of hypertension, mitral valve prolapse, osteoporosis, dementia, coronary artery disease, diverticulosis, hyperlipidemia, hyperparathyroidism, anal prolapse sent to the emergency department at [facility] today for questionable declining mental status and elevated heart rate today. Admitted for sepsis sec to suspected sterco colitis and uti, metabolic encephalopathy, and keely            Problem/Plan - 1:  ·  Problem: Acute encephalopathy.   ·  Plan: Pt presents to the ED for altered mental status at assisted living facility  - likely secondary to worsening dementia vs acute infectious process   - Pt meets Sepsis criteria on admission (T100.5, , WBC >12, source)   - s/p 1L LR bolus x1, ofirmev x1, Rocephin 1g x1, Azithro 500mg x1   - CT head shows Motion limited examination. Age-appropriate involutional changes. No acute intracranial pathology. Specifically, no acute intracranial hemorrhage.  - CT chest/Abdomen pelvis shows Stercoral proctitis. Small subpleural opacity right lung middle lobe may be infectious or inflammatory.  -  RVP negative  - UA shows moderate leuk esterase, WBC 21, few bacteria, +squamous cells   - f/u Urine culture, strep, legionella   - f/u Blood Cx x2   - f/u repeat lactate   - f/u procal  - tylenol prn for fever   - continue rocephin and azithro  -bowel regimen for stercocolitis, consider GI recs if not improving  - f/u speech and swallow    - ID consulted, Dr. Perez, f/u recs.     Problem/Plan - 2:  ·  Problem: Hypernatremia.   ·  Plan: Na 156  - likely 2/2 decreased PO intake/dehydration  - start D5 60cc/hr for 6 hrs   - consider nephro consult if Na decreases too quickly or hypernatremia does not resolve.     Problem/Plan - 3:  ·  Problem: KEELY (acute kidney injury).   ·  Plan: Cr 1.7 on admission   - likely pre-renal in setting of decreased PO intake/dehydration   - continue D5 60cc/hr.     Problem/Plan - 4:  ·  Problem: HTN (hypertension).   ·  Plan: Chronic   - no longer on home losartan   - monitor hemodynamics.     Problem/Plan - 5:  ·  Problem: Dementia.   ·  Plan: Chronic   - continue home risperidone.     Problem/Plan - 6:  ·  Problem: Swelling of lower extremity.   ·  Plan: Chronic  - HOLD home lasix 40mg qd in setting of hypernatremia/KEELY  - follows with Dr. Ogden   - Echocardiography  9/26/2022 revealed severe left atrial enlargement the remainder of the cardiac chambers were normal in dimension. Mild concentric left ventricular hypertrophy was noted. Left ventricular systolic function was normal, with an estimated ejection fraction of 60 to 65%.     please palce sign for us eof hearing aid on door, p it cmo pending hospice eval    no vital checks, no labs daily asper daughter, cont abx for now

## 2023-09-16 NOTE — PROGRESS NOTE ADULT - SUBJECTIVE AND OBJECTIVE BOX
Patient is a 89y old  Female who presents with a chief complaint of Altered mental status (16 Sep 2023 10:33)      SUBJECTIVE / OVERNIGHT EVENTS: pt seen and examined. no fever, no shob, NAD, no c/o pain        Vital Signs Last 24 Hrs  T(C): 36.7 (15 Sep 2023 20:06), Max: 36.7 (15 Sep 2023 20:06)  T(F): 98 (15 Sep 2023 20:06), Max: 98 (15 Sep 2023 20:06)  HR: 86 (15 Sep 2023 20:06) (86 - 86)  BP: 133/81 (15 Sep 2023 20:06) (133/81 - 133/81)  BP(mean): --  RR: 19 (15 Sep 2023 20:06) (19 - 19)  SpO2: 96% (15 Sep 2023 20:06) (96% - 96%)    Parameters below as of 15 Sep 2023 20:06  Patient On (Oxygen Delivery Method): room air      I&O's Summary      PHYSICAL EXAM:  GENERAL: NAD  HEAD:  Atraumatic, Normocephalic  NECK: Supple  CHEST/LUNG: CTABL  HEART: S1+S2  ABDOMEN: Soft, Nontender, Nondistended; Bowel sounds present    LABS:                        13.4   9.36  )-----------( 155      ( 15 Sep 2023 08:20 )             40.3     09-15    155<H>  |  124<H>  |  31<H>  ----------------------------<  100<H>  3.2<L>   |  28  |  1.40<H>    Ca    11.8<H>      15 Sep 2023 08:20    TPro  6.9  /  Alb  2.7<L>  /  TBili  0.7  /  DBili  x   /  AST  25  /  ALT  32  /  AlkPhos  78  09-15    PT/INR - ( 14 Sep 2023 15:55 )   PT: 12.6 sec;   INR: 1.08 ratio         PTT - ( 14 Sep 2023 15:55 )  PTT:26.1 sec  CAPILLARY BLOOD GLUCOSE            Urinalysis Basic - ( 15 Sep 2023 08:20 )    Color: x / Appearance: x / SG: x / pH: x  Gluc: 100 mg/dL / Ketone: x  / Bili: x / Urobili: x   Blood: x / Protein: x / Nitrite: x   Leuk Esterase: x / RBC: x / WBC x   Sq Epi: x / Non Sq Epi: x / Bacteria: x        RADIOLOGY & ADDITIONAL TESTS:    Imaging Personally Reviewed:  [x] YES  [ ] NO    Consultant(s) Notes Reviewed:  [x] YES  [ ] NO      MEDICATIONS  (STANDING):  azithromycin  IVPB 500 milliGRAM(s) IV Intermittent every 24 hours  cefTRIAXone   IVPB 1000 milliGRAM(s) IV Intermittent every 24 hours  cholecalciferol 2000 Unit(s) Oral daily  dextrose 5%. 1000 milliLiter(s) (60 mL/Hr) IV Continuous <Continuous>  heparin   Injectable 5000 Unit(s) SubCutaneous every 12 hours  polyethylene glycol 3350 17 Gram(s) Oral daily  potassium chloride    Tablet ER 20 milliEquivalent(s) Oral daily  risperiDONE   Tablet 0.25 milliGRAM(s) Oral two times a day    MEDICATIONS  (PRN):  acetaminophen     Tablet .. 650 milliGRAM(s) Oral every 6 hours PRN Temp greater or equal to 38C (100.4F)      Care Discussed with Consultants/Other Providers [x] YES  [ ] NO    HEALTH ISSUES - PROBLEM Dx:  Acute encephalopathy    Hypernatremia    KEELY (acute kidney injury)    HTN (hypertension)    Dementia    Swelling of lower extremity    Need for prophylactic measure

## 2023-09-16 NOTE — PROGRESS NOTE ADULT - ASSESSMENT
89-year-old female with history of hypertension, mitral valve prolapse, osteoporosis, dementia, coronary artery disease, diverticulosis, hyperlipidemia, hyperparathyroidism, anal prolapse sent to the emergency department at [facility] today for questionable declining mental status and elevated heart rate today, a/f sepsis sec to suspected sterco colitis and uti, metabolic encephalopathy, and napoleon     CAD, VHD, AMS  - p/w AMS with tachycardia.   - tachy likely 2/2 infectious process, dehydration, sepsis (met criteria) work up ongoing   - CT Chest: stercoral proctitis , small subpleural opacity Right Lung middle lobe may be infectious or inflammatory   - ID following, rec GI consult     - known hx of CAD, follows Dr Ogden (o/p cardiologist)   - EKG: ST with RBBB, nonspecific ST/ TWA, unchanged from prior  - no anginal complaints     - TTE  9/26/2022:  EF 63%, mod AS, severely dilated LA, mild DD  - may obtain routine TTE while admitted to assess  AS, however pt is not a candidate for any aggressive interventions.   - no sign of volume overload, b/l LE edema (chronic)   - hypernatremic Na: 158-> 155 on IVF, continue to trend   - monitor strict I/O's     - BP stable and controlled per flow sheet   - HR improved now 80-90's   - continue to monitor routine hemodynamics  - Monitor and replete Lytes. Keep K > 4 and Mg > 2    - Will continue to follow.    Martha Reddy [facility]  Nurse Practitioner - Cardiology   call TEAMS

## 2023-09-17 PROCEDURE — 99233 SBSQ HOSP IP/OBS HIGH 50: CPT

## 2023-09-17 PROCEDURE — 99232 SBSQ HOSP IP/OBS MODERATE 35: CPT

## 2023-09-17 RX ADMIN — RISPERIDONE 0.25 MILLIGRAM(S): 4 TABLET ORAL at 17:28

## 2023-09-17 RX ADMIN — Medication 2000 UNIT(S): at 11:57

## 2023-09-17 RX ADMIN — CEFTRIAXONE 100 MILLIGRAM(S): 500 INJECTION, POWDER, FOR SOLUTION INTRAMUSCULAR; INTRAVENOUS at 17:30

## 2023-09-17 RX ADMIN — Medication 20 MILLIEQUIVALENT(S): at 12:00

## 2023-09-17 RX ADMIN — RISPERIDONE 0.25 MILLIGRAM(S): 4 TABLET ORAL at 05:30

## 2023-09-17 RX ADMIN — HEPARIN SODIUM 5000 UNIT(S): 5000 INJECTION INTRAVENOUS; SUBCUTANEOUS at 17:29

## 2023-09-17 RX ADMIN — POLYETHYLENE GLYCOL 3350 17 GRAM(S): 17 POWDER, FOR SOLUTION ORAL at 11:58

## 2023-09-17 RX ADMIN — HEPARIN SODIUM 5000 UNIT(S): 5000 INJECTION INTRAVENOUS; SUBCUTANEOUS at 05:30

## 2023-09-17 NOTE — PROGRESS NOTE ADULT - SUBJECTIVE AND OBJECTIVE BOX
Patient is a 89y old  Female who presents with a chief complaint of Altered mental status (17 Sep 2023 09:23)      SUBJECTIVE / OVERNIGHT EVENTS: pt seen and examined. no fever, no shob, NAD, no c/o pain        Vital Signs Last 24 Hrs  T(C): 36.6 (17 Sep 2023 05:04), Max: 36.8 (16 Sep 2023 20:40)  T(F): 97.9 (17 Sep 2023 05:04), Max: 98.2 (16 Sep 2023 20:40)  HR: 95 (17 Sep 2023 05:04) (84 - 95)  BP: 140/93 (17 Sep 2023 05:04) (108/72 - 140/93)  BP(mean): --  RR: 17 (17 Sep 2023 05:04) (17 - 18)  SpO2: 92% (17 Sep 2023 05:04) (92% - 96%)    Parameters below as of 17 Sep 2023 05:04  Patient On (Oxygen Delivery Method): room air      I&O's Summary    16 Sep 2023 07:01  -  17 Sep 2023 07:00  --------------------------------------------------------  IN: 0 mL / OUT: 200 mL / NET: -200 mL        PHYSICAL EXAM:  GENERAL: NAD  HEAD:  Atraumatic, Normocephalic  NECK: Supple  CHEST/LUNG: CTABL  HEART: S1+S2  SKIN: No rashes or lesions    LABS:            CAPILLARY BLOOD GLUCOSE                RADIOLOGY & ADDITIONAL TESTS:    Imaging Personally Reviewed:  [x] YES  [ ] NO    Consultant(s) Notes Reviewed:  [x] YES  [ ] NO      MEDICATIONS  (STANDING):  cefTRIAXone   IVPB 1000 milliGRAM(s) IV Intermittent every 24 hours  cholecalciferol 2000 Unit(s) Oral daily  dextrose 5%. 1000 milliLiter(s) (60 mL/Hr) IV Continuous <Continuous>  heparin   Injectable 5000 Unit(s) SubCutaneous every 12 hours  polyethylene glycol 3350 17 Gram(s) Oral daily  potassium chloride    Tablet ER 20 milliEquivalent(s) Oral daily  risperiDONE   Tablet 0.25 milliGRAM(s) Oral two times a day    MEDICATIONS  (PRN):  acetaminophen     Tablet .. 650 milliGRAM(s) Oral every 6 hours PRN Temp greater or equal to 38C (100.4F)      Care Discussed with Consultants/Other Providers [x] YES  [ ] NO    HEALTH ISSUES - PROBLEM Dx:  Acute encephalopathy    Hypernatremia    KEELY (acute kidney injury)    HTN (hypertension)    Dementia    Swelling of lower extremity    Need for prophylactic measure

## 2023-09-17 NOTE — PROGRESS NOTE ADULT - SUBJECTIVE AND OBJECTIVE BOX
[facility] -- Mili Ogden, Bernabe, Stuart Cyr, , Carl Walker  Office # [phone]    Follow Up:  MVP, CAD, AMS    Subjective/Observations:     REVIEW OF SYSTEMS: All other review of systems is negative unless indicated above  PAST MEDICAL & SURGICAL HISTORY:  History of Hypertension  Age Related Osteoporosis  Heart murmur  Otosclerosis  Fungal infection  left foot ; tx with cream ; x 3 weeks ; pt reports improvement  MVP (mitral valve prolapse)  [place] (hard of hearing)  Anal prolapse  H/O:  Section  1957, 1060, 1963  LIH Repair  Right Stapedectomy    Left Stapedectomy    Right  Cataract  Excision    Left Cataract    Surgical Treatment Otosclerosis   ? "Mobilization of Stapes"  Otosclerosis of right ear  S/P Stapedectomy- 10/12/1978  Otosclerosis of left ear  stapedectomy-   S/P hernia repair  ventral hernia with mesh- 10/2012  S/P left inguinal hernia repair  with mesh   S/P colonoscopy with polypectomy  times 2    MEDICATIONS  (STANDING):  cefTRIAXone   IVPB 1000 milliGRAM(s) IV Intermittent every 24 hours  cholecalciferol 2000 Unit(s) Oral daily  dextrose 5%. 1000 milliLiter(s) (60 mL/Hr) IV Continuous <Continuous>  heparin   Injectable 5000 Unit(s) SubCutaneous every 12 hours  polyethylene glycol 3350 17 Gram(s) Oral daily  potassium chloride    Tablet ER 20 milliEquivalent(s) Oral daily  risperiDONE   Tablet 0.25 milliGRAM(s) Oral two times a day    MEDICATIONS  (PRN):  acetaminophen     Tablet .. 650 milliGRAM(s) Oral every 6 hours PRN Temp greater or equal to 38C (100.4F)    Allergies    Shrimp (Unknown)  penicillins (Hives)  Aricept (Unknown)  Onions (Unknown)  hydrochlorothiazide (Faint)  sertraline (Unknown)  amoxicillin (Unknown)  rivastigmine (Unknown)    Intolerances    Vital Signs Last 24 Hrs  T(C): 36.6 (17 Sep 2023 05:04), Max: 36.8 (16 Sep 2023 15:00)  T(F): 97.9 (17 Sep 2023 05:04), Max: 98.2 (16 Sep 2023 15:00)  HR: 95 (17 Sep 2023 05:04) (84 - 95)  BP: 140/93 (17 Sep 2023 05:04) (108/72 - 140/93)  BP(mean): --  RR: 17 (17 Sep 2023 05:04) (17 - 18)  SpO2: 92% (17 Sep 2023 05:04) (92% - 96%)    Parameters below as of 17 Sep 2023 05:04  Patient On (Oxygen Delivery Method): room air    I&O's Summary    16 Sep 2023 07:01  -  17 Sep 2023 07:00  --------------------------------------------------------  IN: 0 mL / OUT: 200 mL / NET: -200 mL      PHYSICAL EXAM:  TELE: Not on tele  Constitutional: NAD, awake and alert, well-developed  HEENT: Moist Mucous Membranes, Anicteric  Pulmonary: Non-labored, breath sounds are clear bilaterally, No wheezing, rales or rhonchi  Cardiovascular: Regular, S1 and S2, No murmurs, rubs, gallops or clicks  Gastrointestinal: Bowel Sounds present, soft, nontender.   Lymph: No peripheral edema. No lymphadenopathy.  Skin: No visible rashes or ulcers.  Psych:  Mood & affect appropriate  LABS: All Labs Reviewed:                        13.4   9.36  )-----------( 155      ( 15 Sep 2023 08:20 )             40.3                         14.2   12.17 )-----------( 152      ( 14 Sep 2023 15:55 )             43.5     15 Sep 2023 08:20    155    |  124    |  31     ----------------------------<  100    3.2     |  28     |  1.40   14 Sep 2023 15:55    156    |  123    |  43     ----------------------------<  106    3.7     |  29     |  1.70     Ca    11.8       15 Sep 2023 08:20  Ca    12.7       14 Sep 2023 15:55    TPro  6.9    /  Alb  2.7    /  TBili  0.7    /  DBili  x      /  AST  25     /  ALT  32     /  AlkPhos  78     15 Sep 2023 08:20  TPro  7.6    /  Alb  3.1    /  TBili  0.8    /  DBili  x      /  AST  24     /  ALT  27     /  AlkPhos  79     14 Sep 2023 15:55    12 Lead ECG:   Ventricular Rate 117 BPM    Atrial Rate 117 BPM    P-R Interval 152 ms    QRS Duration 114 ms    Q-T Interval 330 ms    QTC Calculation(Bazett) 460 ms    P Axis 27 degrees    R Axis -80 degrees    T Axis 32 degrees    Diagnosis Line *** Poor data quality, interpretation may be adversely affected  Sinus tachycardia  Right bundle branch block  Left anterior fascicular block  *** Bifascicular block ***  Minimal voltage criteria for LVH, may be normal variant ( R in aVL )  Abnormal ECG  When compared with ECG of 05-SEP-2023 09:37,  No significant change was found  Confirmed by TERRANCE CYR ([id number]) on 9/15/2023 6:04:34 PM (23 @ 13:47)        [facility] -- Mili Ogden, Bernabe, Stuart Cyr, , Carl Walker  Office # [phone]    Follow Up:  MVP, CAD, AMS, EKG abnormality    Subjective/Observations: Awake and alert but pleasantly confused and disoriented.  Unable to obtain ROS.  Comfortable on RA.  Not in any from of distress    REVIEW OF SYSTEMS: All other review of systems is negative unless indicated above  PAST MEDICAL & SURGICAL HISTORY:  History of Hypertension  Age Related Osteoporosis  Heart murmur  Otosclerosis  Fungal infection  left foot ; tx with cream ; x 3 weeks ; pt reports improvement  MVP (mitral valve prolapse)  [place] (hard of hearing)  Anal prolapse  H/O:  Section  , ,   LIH Repair  Right Stapedectomy    Left Stapedectomy    Right  Cataract  Excision    Left Cataract    Surgical Treatment Otosclerosis   ? "Mobilization of Stapes"  Otosclerosis of right ear  S/P Stapedectomy- 10/12/1978  Otosclerosis of left ear  stapedectomy-   S/P hernia repair  ventral hernia with mesh- 10/2012  S/P left inguinal hernia repair  with mesh   S/P colonoscopy with polypectomy  times 2    MEDICATIONS  (STANDING):  cefTRIAXone   IVPB 1000 milliGRAM(s) IV Intermittent every 24 hours  cholecalciferol 2000 Unit(s) Oral daily  dextrose 5%. 1000 milliLiter(s) (60 mL/Hr) IV Continuous <Continuous>  heparin   Injectable 5000 Unit(s) SubCutaneous every 12 hours  polyethylene glycol 3350 17 Gram(s) Oral daily  potassium chloride    Tablet ER 20 milliEquivalent(s) Oral daily  risperiDONE   Tablet 0.25 milliGRAM(s) Oral two times a day    MEDICATIONS  (PRN):  acetaminophen     Tablet .. 650 milliGRAM(s) Oral every 6 hours PRN Temp greater or equal to 38C (100.4F)    Allergies    Shrimp (Unknown)  penicillins (Hives)  Aricept (Unknown)  Onions (Unknown)  hydrochlorothiazide (Faint)  sertraline (Unknown)  amoxicillin (Unknown)  rivastigmine (Unknown)    Intolerances    Vital Signs Last 24 Hrs  T(C): 36.6 (17 Sep 2023 05:04), Max: 36.8 (16 Sep 2023 15:00)  T(F): 97.9 (17 Sep 2023 05:04), Max: 98.2 (16 Sep 2023 15:00)  HR: 95 (17 Sep 2023 05:04) (84 - 95)  BP: 140/93 (17 Sep 2023 05:04) (108/72 - 140/93)  BP(mean): --  RR: 17 (17 Sep 2023 05:04) (17 - 18)  SpO2: 92% (17 Sep 2023 05:04) (92% - 96%)    Parameters below as of 17 Sep 2023 05:04  Patient On (Oxygen Delivery Method): room air    I&O's Summary    16 Sep 2023 07:01  -  17 Sep 2023 07:00  --------------------------------------------------------  IN: 0 mL / OUT: 200 mL / NET: -200 mL      PHYSICAL EXAM:  TELE: Not on tele  Constitutional: NAD, awake and alert, well-developed  HEENT: Moist Mucous Membranes, Anicteric  Pulmonary: Non-labored, breath sounds are clear bilaterally, No wheezing, rales or rhonchi  Cardiovascular: Regular, S1 and S2, +murmurs, no rubs, gallops or clicks  Gastrointestinal: Bowel Sounds present, soft, nontender.   Lymph: No peripheral edema. No lymphadenopathy.  Skin: No visible rashes or ulcers.  +ecchymosis left cheekbone  Psych:  Mood & affect: confused and disoriented  LABS: All Labs Reviewed:                        13.4   9.36  )-----------( 155      ( 15 Sep 2023 08:20 )             40.3                         14.2   12.17 )-----------( 152      ( 14 Sep 2023 15:55 )             43.5     15 Sep 2023 08:20    155    |  124    |  31     ----------------------------<  100    3.2     |  28     |  1.40   14 Sep 2023 15:55    156    |  123    |  43     ----------------------------<  106    3.7     |  29     |  1.70     Ca    11.8       15 Sep 2023 08:20  Ca    12.7       14 Sep 2023 15:55    TPro  6.9    /  Alb  2.7    /  TBili  0.7    /  DBili  x      /  AST  25     /  ALT  32     /  AlkPhos  78     15 Sep 2023 08:20  TPro  7.6    /  Alb  3.1    /  TBili  0.8    /  DBili  x      /  AST  24     /  ALT  27     /  AlkPhos  79     14 Sep 2023 15:55    12 Lead ECG:   Ventricular Rate 117 BPM    Atrial Rate 117 BPM    P-R Interval 152 ms    QRS Duration 114 ms    Q-T Interval 330 ms    QTC Calculation(Bazett) 460 ms    P Axis 27 degrees    R Axis -80 degrees    T Axis 32 degrees    Diagnosis Line *** Poor data quality, interpretation may be adversely affected  Sinus tachycardia  Right bundle branch block  Left anterior fascicular block  *** Bifascicular block ***  Minimal voltage criteria for LVH, may be normal variant ( R in aVL )  Abnormal ECG  When compared with ECG of 05-SEP-2023 09:37,  No significant change was found  Confirmed by TERRANCE CYR ([id number]) on 9/15/2023 6:04:34 PM (23 @ 13:47)

## 2023-09-17 NOTE — PROGRESS NOTE ADULT - ASSESSMENT
89-year-old female with history of hypertension, mitral valve prolapse, osteoporosis, dementia, coronary artery disease, diverticulosis, hyperlipidemia, hyperparathyroidism, anal prolapse sent to the emergency department at [facility] today for questionable declining mental status and elevated heart rate today, a/f sepsis sec to suspected sterco colitis and uti, metabolic encephalopathy, and napoleon     CAD, VHD, AMS, Bifascicular block  - p/w AMS with tachycardia, now resolved   - Tachy likely 2/2 infectious process, dehydration, sepsis (met criteria) work up ongoing   - CT Chest: stercoral proctitis , small subpleural opacity Right Lung middle lobe may be infectious or inflammatory   - ID following, rec GI consult     - Known hx of CAD, follows Dr Ogden (o/p cardiologist)   - EKG: ST with RBBB, nonspecific ST/ TWA, unchanged from prior  - With bifascicular block.  No evidence of AV block noted  - No anginal complaints     - TTE  9/26/2022:  EF 63%, mod AS, severely dilated LA, mild DD  - May obtain routine TTE while admitted to assess  AS, however pt is not a candidate for any aggressive interventions.   - No sign of volume overload, b/l LE edema (chronic)   - Remains hypernatremic Na: 158-> 155, s/p D5W.  Encourage PO fluids    - BP stable and controlled per flow sheet   - Monitor and replete Lytes. Keep K > 4 and Mg > 2  - Will continue to follow.    Rox Cohen DNP, NP-C, AGACNP-C  Cardiology   Call TEAMS

## 2023-09-17 NOTE — PROGRESS NOTE ADULT - ASSESSMENT
89-year-old female with history of hypertension, mitral valve prolapse, osteoporosis, dementia, coronary artery disease, diverticulosis, hyperlipidemia, hyperparathyroidism, anal prolapse sent to the emergency department at [facility] today for questionable declining mental status and elevated heart rate today. Admitted for sepsis sec to suspected sterco colitis and uti, metabolic encephalopathy, and keely            Problem/Plan - 1:  ·  Problem: Acute encephalopathy.   ·  Plan: Pt presents to the ED for altered mental status at assisted living facility  - likely secondary to worsening dementia vs acute infectious process   - Pt meets Sepsis criteria on admission (T100.5, , WBC >12, source)   - s/p 1L LR bolus x1, ofirmev x1, Rocephin 1g x1, Azithro 500mg x1   - CT head shows Motion limited examination. Age-appropriate involutional changes. No acute intracranial pathology. Specifically, no acute intracranial hemorrhage.  - CT chest/Abdomen pelvis shows Stercoral proctitis. Small subpleural opacity right lung middle lobe may be infectious or inflammatory.  -  RVP negative  - UA shows moderate leuk esterase, WBC 21, few bacteria, +squamous cells   - f/u Urine culture, strep, legionella   - f/u Blood Cx x2   - f/u repeat lactate   - f/u procal  - tylenol prn for fever   - continue rocephin and azithro  -bowel regimen for stercocolitis, consider GI recs if not improving  - f/u speech and swallow    - ID consulted, Dr. Perez, f/u recs.     Problem/Plan - 2:  ·  Problem: Hypernatremia.   ·  Plan: Na 156  - likely 2/2 decreased PO intake/dehydration  - start D5 60cc/hr for 6 hrs   - consider nephro consult if Na decreases too quickly or hypernatremia does not resolve.     Problem/Plan - 3:  ·  Problem: KEELY (acute kidney injury).   ·  Plan: Cr 1.7 on admission   - likely pre-renal in setting of decreased PO intake/dehydration   - continue D5 60cc/hr.     Problem/Plan - 4:  ·  Problem: HTN (hypertension).   ·  Plan: Chronic   - no longer on home losartan   - monitor hemodynamics.     Problem/Plan - 5:  ·  Problem: Dementia.   ·  Plan: Chronic   - continue home risperidone.     Problem/Plan - 6:  ·  Problem: Swelling of lower extremity.   ·  Plan: Chronic  - HOLD home lasix 40mg qd in setting of hypernatremia/KEELY  - follows with Dr. Ogden   - Echocardiography  9/26/2022 revealed severe left atrial enlargement the remainder of the cardiac chambers were normal in dimension. Mild concentric left ventricular hypertrophy was noted. Left ventricular systolic function was normal, with an estimated ejection fraction of 60 to 65%.     please palce sign for us eof hearing aid on door, p it cmo pending hospice eval    no vital checks, no labs daily asper daughter, cont abx for now

## 2023-09-18 ENCOUNTER — TRANSCRIPTION ENCOUNTER (OUTPATIENT)
Age: 88
End: 2023-09-18

## 2023-09-18 DIAGNOSIS — R53.1 WEAKNESS: ICD-10-CM

## 2023-09-18 DIAGNOSIS — N39.0 URINARY TRACT INFECTION, SITE NOT SPECIFIED: ICD-10-CM

## 2023-09-18 PROCEDURE — 99233 SBSQ HOSP IP/OBS HIGH 50: CPT

## 2023-09-18 PROCEDURE — 99232 SBSQ HOSP IP/OBS MODERATE 35: CPT

## 2023-09-18 RX ORDER — CEFTRIAXONE 500 MG/1
1000 INJECTION, POWDER, FOR SOLUTION INTRAMUSCULAR; INTRAVENOUS EVERY 24 HOURS
Refills: 0 | Status: COMPLETED | OUTPATIENT
Start: 2023-09-18 | End: 2023-09-18

## 2023-09-18 RX ORDER — LINEZOLID 600 MG/300ML
600 INJECTION, SOLUTION INTRAVENOUS EVERY 12 HOURS
Refills: 0 | Status: COMPLETED | OUTPATIENT
Start: 2023-09-18 | End: 2023-09-21

## 2023-09-18 RX ADMIN — Medication 2000 UNIT(S): at 11:12

## 2023-09-18 RX ADMIN — HEPARIN SODIUM 5000 UNIT(S): 5000 INJECTION INTRAVENOUS; SUBCUTANEOUS at 18:44

## 2023-09-18 RX ADMIN — CEFTRIAXONE 100 MILLIGRAM(S): 500 INJECTION, POWDER, FOR SOLUTION INTRAMUSCULAR; INTRAVENOUS at 17:59

## 2023-09-18 RX ADMIN — Medication 20 MILLIEQUIVALENT(S): at 11:12

## 2023-09-18 RX ADMIN — RISPERIDONE 0.25 MILLIGRAM(S): 4 TABLET ORAL at 05:37

## 2023-09-18 RX ADMIN — POLYETHYLENE GLYCOL 3350 17 GRAM(S): 17 POWDER, FOR SOLUTION ORAL at 11:12

## 2023-09-18 RX ADMIN — HEPARIN SODIUM 5000 UNIT(S): 5000 INJECTION INTRAVENOUS; SUBCUTANEOUS at 05:38

## 2023-09-18 NOTE — DISCHARGE NOTE PROVIDER - NSDCCPCAREPLAN_GEN_ALL_CORE_FT
PRINCIPAL DISCHARGE DIAGNOSIS  Diagnosis: Pneumonia  Assessment and Plan of Treatment: You were admitted with PNA. You were strated on intrvenous antibiotics. Your symptoms have improved.      SECONDARY DISCHARGE DIAGNOSES  Diagnosis: HTN (hypertension)  Assessment and Plan of Treatment: Continue current medical management.    Diagnosis: Proctocolitis  Assessment and Plan of Treatment: Continue with bowel regimen. Follow with your primary medical doctor.     PRINCIPAL DISCHARGE DIAGNOSIS  Diagnosis: Pneumonia  Assessment and Plan of Treatment: S/p antibiotic   Family decided comfort measures only and hospice evaluation      SECONDARY DISCHARGE DIAGNOSES  Diagnosis: HTN (hypertension)  Assessment and Plan of Treatment: Continue current medical management.    Diagnosis: Proctocolitis  Assessment and Plan of Treatment: Continue with bowel regimen. Follow with your primary medical doctor.     PRINCIPAL DISCHARGE DIAGNOSIS  Diagnosis: Pneumonia  Assessment and Plan of Treatment: S/p antibiotic   Family decided comfort measures only and hospice evaluation      SECONDARY DISCHARGE DIAGNOSES  Diagnosis: HTN (hypertension)  Assessment and Plan of Treatment: on hospisce care .    Diagnosis: Proctocolitis  Assessment and Plan of Treatment: on hospisce care .     PRINCIPAL DISCHARGE DIAGNOSIS  Diagnosis: Pneumonia  Assessment and Plan of Treatment: S/p antibiotic   Family decided comfort measures only and hospice evaluation      SECONDARY DISCHARGE DIAGNOSES  Diagnosis: HTN (hypertension)  Assessment and Plan of Treatment: on hospisce care ./no meds    Diagnosis: Proctocolitis  Assessment and Plan of Treatment: on hospisce care ./ miralx daily.

## 2023-09-18 NOTE — DISCHARGE NOTE PROVIDER - NSDCMRMEDTOKEN_GEN_ALL_CORE_FT
Centrum Silver oral tablet: 1 tab(s) orally once a day  cholecalciferol 50 mcg (2000 intl units) oral tablet: 1 tab(s) orally once a day  furosemide 40 mg oral tablet: 1 tab(s) orally once a day  ketoconazole 2% topical cream: Apply topically to affected area 2 times a day both feet  polyethylene glycol 3350 oral powder for reconstitution: 17 gram(s) orally once a day  Potassium Chloride (Eqv-Klor-Con 10) 10 mEq oral tablet, extended release: 2 tab(s) orally once a day  PreserVision AREDS 2 oral capsule: 1 cap(s) orally once a day  risperiDONE 0.25 mg oral tablet: 1 tab(s) orally 2 times a day   polyethylene glycol 3350 oral powder for reconstitution: 17 gram(s) orally once a day  risperiDONE 0.25 mg oral tablet: 1 tab(s) orally 2 times a day

## 2023-09-18 NOTE — PROGRESS NOTE ADULT - ASSESSMENT
89-year-old female with history of hypertension, mitral valve prolapse, osteoporosis, dementia, coronary artery disease, diverticulosis, hyperlipidemia, hyperparathyroidism, anal prolapse sent to the emergency department at  today for questionable declining mental status and elevated heart rate today, a/f sepsis sec to suspected sterco colitis and uti, metabolic encephalopathy, and napoleon     CAD, VHD, AMS, Bifascicular block  - p/w AMS with tachycardia, now resolved   - Tachy likely 2/2 infectious process, dehydration, sepsis (met criteria) work up ongoing   - CT Chest: stercoral proctitis , small subpleural opacity Right Lung middle lobe may be infectious or inflammatory   - ID following, rec GI consult     - Known hx of CAD, follows Dr Ogden (o/p cardiologist)   - EKG: ST with RBBB, nonspecific ST/ TWA, unchanged from prior  - With bifascicular block.  No evidence of AV block noted  - No anginal complaints     - TTE  9/26/2022:  EF 63%, mod AS, severely dilated LA, mild DD  - May obtain routine TTE while admitted to assess  AS, however pt is not a candidate for any aggressive interventions.   - No sign of volume overload.  She is in fact intravascularly dry.  Avoid diuresing patient for now.  LE edema is chronic and is only trace  - Remains hypernatremic Na: 158-> 155, s/p D5W.  Encourage PO fluids    - BP stable and controlled per flow sheet   - Monitor and replete Lytes. Keep K > 4 and Mg > 2  - Will continue to follow.    Rox Cohen DNP, NP-C, AGACNP-C  Cardiology   Call TEAMS

## 2023-09-18 NOTE — PROGRESS NOTE ADULT - SUBJECTIVE AND OBJECTIVE BOX
[facility]  INFECTIOUS DISEASES   [address] [place] [address]  Tel: [phone]     Fax: [phone]  ========================================================  MD Nicole Kerns Kaushal, MD Cho, Michelle, MD Sunjit, Jaspal, MD  ========================================================    [id number]  TUNG MILLER     Follow up: Possible Aspiration pneumonia and UTI?    Lying in bed, seems comfortable, no fever, awake and alert but not oriented.     PAST MEDICAL & SURGICAL HISTORY:  History of Hypertension  Age Related Osteoporosis  Heart murmur  Otosclerosis  Fungal infection  left foot ; tx with cream ; x 3 weeks ; pt reports improvement  MVP (mitral valve prolapse)  [place] (hard of hearing)  Anal prolapse  H/O:  Section  , 0,   LIH Repair  Right Stapedectomy    Left Stapedectomy    Right  Cataract  Excision    Left Cataract    Surgical Treatment Otosclerosis   ? "Mobilization of Stapes"  Otosclerosis of right ear  S/P Stapedectomy- 10/12/1978  Otosclerosis of left ear  stapedectomy-   S/P hernia repair  ventral hernia with mesh- 10/2012  S/P left inguinal hernia repair  with mesh   S/P colonoscopy with polypectomy  times 2    Social Hx: No smoking, EtOH or drugs     FAMILY HISTORY: Noncontributory     Allergies  Shrimp (Unknown)  penicillins (Hives)  Aricept (Unknown)  Onions (Unknown)  hydrochlorothiazide (Faint)  sertraline (Unknown)  amoxicillin (Unknown)  rivastigmine (Unknown)    MEDICATIONS  (STANDING):  cefTRIAXone   IVPB 1000 milliGRAM(s) IV Intermittent every 24 hours  cholecalciferol 2000 Unit(s) Oral daily  dextrose 5%. 1000 milliLiter(s) (60 mL/Hr) IV Continuous <Continuous>  heparin   Injectable 5000 Unit(s) SubCutaneous every 12 hours  polyethylene glycol 3350 17 Gram(s) Oral daily  potassium chloride    Tablet ER 20 milliEquivalent(s) Oral daily  risperiDONE   Tablet 0.25 milliGRAM(s) Oral two times a day     REVIEW OF SYSTEMS:  Unable     Physical Exam:  Vital Signs Last 24 Hrs  T(C): 36.5 (18 Sep 2023 13:16), Max: 36.6 (17 Sep 2023 20:32)  T(F): 97.7 (18 Sep 2023 13:16), Max: 97.9 (17 Sep 2023 20:32)  HR: 96 (18 Sep 2023 13:16) (96 - 100)  BP: 121/66 (18 Sep 2023 13:16) (121/66 - 136/89)  RR: 18 (18 Sep 2023 13:16) (18 - 18)  SpO2: 91% (18 Sep 2023 13:16) (91% - 93%)  Parameters below as of 18 Sep 2023 13:16  Patient On (Oxygen Delivery Method): room air  GEN: NAD  HEENT: normocephalic and atraumatic.   NECK: Supple.  No lymphadenopathy   LUNGS: Coarse breath sounds with scattered rhonchi   HEART: Regular rate and rhythm   ABDOMEN: Soft, nontender, and nondistended.  Positive bowel sounds.    EXTREMITIES: Without edema.  NEUROLOGIC: unable   PSYCHIATRIC: lethargic  SKIN: No rash     Labs:  Culture - Urine (collected 23 @ 17:50)  Source: Clean Catch Clean Catch (Midstream)    Culture - Blood (collected 23 @ 15:55)  Source: .Blood Blood-Peripheral    Culture - Blood (collected 23 @ 15:50)  Source: .Blood Blood-Peripheral    Culture - Blood (collected 23 @ 10:05)  Source: .Blood Blood-Peripheral  Final Report (09-10-23 @ 17:00):    No growth at 5 days    Culture - Urine (collected 23 @ 10:00)  Source: Clean Catch Clean Catch (Midstream)  Final Report (23 @ 19:03):    >100,000 CFU/ml Enterococcus faecalis  Organism: Enterococcus faecalis (23 @ 19:03)  Organism: Enterococcus faecalis (23 @ 19:03)    Sensitivities:      Method Type: ABBY      -  Ampicillin: S <=2 Predicts results to ampicillin/sulbactam, amoxacillin-clavulanate and  piperacillin-tazobactam.      -  Ciprofloxacin: S <=1      -  Levofloxacin: S <=0.5      -  Nitrofurantoin: S <=32 Should not be used to treat pyelonephritis.      -  Tetracycline: R >8      -  Vancomycin: S 2    Culture - Blood (collected 23 @ 10:00)  Source: .Blood Blood-Peripheral  Final Report (09-10-23 @ 17:00):    No growth at 5 days    WBC Count: 9.36 K/uL (09-15-23 @ 08:20)  WBC Count: 12.17 K/uL (23 @ 15:55)    Creatinine: 1.40 mg/dL (09-15-23 @ 08:20)  Creatinine: 1.70 mg/dL (23 @ 15:55)    Procalcitonin, Serum: 0.09 ng/mL (09-15-23 @ 08:20)     SARS-CoV-2 Result: NotDetec (23 @ 15:55)  SARS-CoV-2 Result: NotDetec (23 @ 10:00)    All imaging and other data have been reviewed.  < from: CT Abdomen and Pelvis No Cont (23 @ 18:04) >  IMPRESSION:  Stercoral proctitis.  Small subpleural opacity right lung middle lobe may be infectious or   inflammatory.    Assessment and Plan:   89yo woman with PMH of HTN, CAD, MVprolapse, osteoporosis, dementia, hyperparathyroidism and anal prolapse sent to the ED for altered mental status and tachycardia.   CT chest showed possible pneumonia in Left lung with possibility of aspiration pneumonia    # AMS  # Aspiration pneumonia     - Blood cultures NGTD  - UC with Enterococcus   - Monitor Tmax, WBC and renal function  - Legionella and strep urinary antigen both negative   - Will switch to unasyn to cover aspiration pneumonia and E faecalis UTI  - Azithromycin was stopped.     Will follow.    Mita Perez MD  Division of Infectious Diseases   Please call ID service at [phone] with any question.    50 minutes spent on total encounter assessing patient, examination, chart review, counseling and coordinating care by the attending physician/nurse/care manager.   [facility]  INFECTIOUS DISEASES   [address] [place] [address]  Tel: [phone]     Fax: [phone]  ========================================================  MD Nicole Kerns Kaushal, MD Cho, Michelle, MD Sunjit, Jaspal, MD  ========================================================    [id number]  TUNG MILLER     Follow up: Possible Aspiration pneumonia and UTI?    Lying in bed, seems comfortable, no fever, awake and alert but not oriented.     PAST MEDICAL & SURGICAL HISTORY:  History of Hypertension  Age Related Osteoporosis  Heart murmur  Otosclerosis  Fungal infection  left foot ; tx with cream ; x 3 weeks ; pt reports improvement  MVP (mitral valve prolapse)  [place] (hard of hearing)  Anal prolapse  H/O:  Section  , 0,   LIH Repair  Right Stapedectomy    Left Stapedectomy    Right  Cataract  Excision    Left Cataract    Surgical Treatment Otosclerosis   ? "Mobilization of Stapes"  Otosclerosis of right ear  S/P Stapedectomy- 10/12/1978  Otosclerosis of left ear  stapedectomy-   S/P hernia repair  ventral hernia with mesh- 10/2012  S/P left inguinal hernia repair  with mesh   S/P colonoscopy with polypectomy  times 2    Social Hx: No smoking, EtOH or drugs     FAMILY HISTORY: Noncontributory     Allergies  Shrimp (Unknown)  penicillins (Hives)  Aricept (Unknown)  Onions (Unknown)  hydrochlorothiazide (Faint)  sertraline (Unknown)  amoxicillin (Unknown)  rivastigmine (Unknown)    MEDICATIONS  (STANDING):  cefTRIAXone   IVPB 1000 milliGRAM(s) IV Intermittent every 24 hours  cholecalciferol 2000 Unit(s) Oral daily  dextrose 5%. 1000 milliLiter(s) (60 mL/Hr) IV Continuous <Continuous>  heparin   Injectable 5000 Unit(s) SubCutaneous every 12 hours  polyethylene glycol 3350 17 Gram(s) Oral daily  potassium chloride    Tablet ER 20 milliEquivalent(s) Oral daily  risperiDONE   Tablet 0.25 milliGRAM(s) Oral two times a day     REVIEW OF SYSTEMS:  Unable     Physical Exam:  Vital Signs Last 24 Hrs  T(C): 36.5 (18 Sep 2023 13:16), Max: 36.6 (17 Sep 2023 20:32)  T(F): 97.7 (18 Sep 2023 13:16), Max: 97.9 (17 Sep 2023 20:32)  HR: 96 (18 Sep 2023 13:16) (96 - 100)  BP: 121/66 (18 Sep 2023 13:16) (121/66 - 136/89)  RR: 18 (18 Sep 2023 13:16) (18 - 18)  SpO2: 91% (18 Sep 2023 13:16) (91% - 93%)  Parameters below as of 18 Sep 2023 13:16  Patient On (Oxygen Delivery Method): room air  GEN: NAD  HEENT: normocephalic and atraumatic.   NECK: Supple.  No lymphadenopathy   LUNGS: Coarse breath sounds with scattered rhonchi   HEART: Regular rate and rhythm   ABDOMEN: Soft, nontender, and nondistended.  Positive bowel sounds.    EXTREMITIES: Without edema.  NEUROLOGIC: unable   PSYCHIATRIC: lethargic  SKIN: No rash     Labs:  Culture - Urine (collected 23 @ 17:50)  Source: Clean Catch Clean Catch (Midstream)    Culture - Blood (collected 23 @ 15:55)  Source: .Blood Blood-Peripheral    Culture - Blood (collected 23 @ 15:50)  Source: .Blood Blood-Peripheral    Culture - Blood (collected 23 @ 10:05)  Source: .Blood Blood-Peripheral  Final Report (09-10-23 @ 17:00):    No growth at 5 days    Culture - Urine (collected 23 @ 10:00)  Source: Clean Catch Clean Catch (Midstream)  Final Report (23 @ 19:03):    >100,000 CFU/ml Enterococcus faecalis  Organism: Enterococcus faecalis (23 @ 19:03)  Organism: Enterococcus faecalis (23 @ 19:03)    Sensitivities:      Method Type: ABBY      -  Ampicillin: S <=2 Predicts results to ampicillin/sulbactam, amoxacillin-clavulanate and  piperacillin-tazobactam.      -  Ciprofloxacin: S <=1      -  Levofloxacin: S <=0.5      -  Nitrofurantoin: S <=32 Should not be used to treat pyelonephritis.      -  Tetracycline: R >8      -  Vancomycin: S 2    Culture - Blood (collected 23 @ 10:00)  Source: .Blood Blood-Peripheral  Final Report (09-10-23 @ 17:00):    No growth at 5 days    WBC Count: 9.36 K/uL (09-15-23 @ 08:20)  WBC Count: 12.17 K/uL (23 @ 15:55)    Creatinine: 1.40 mg/dL (09-15-23 @ 08:20)  Creatinine: 1.70 mg/dL (23 @ 15:55)    Procalcitonin, Serum: 0.09 ng/mL (09-15-23 @ 08:20)     SARS-CoV-2 Result: NotDetec (23 @ 15:55)  SARS-CoV-2 Result: NotDetec (23 @ 10:00)    All imaging and other data have been reviewed.  < from: CT Abdomen and Pelvis No Cont (23 @ 18:04) >  IMPRESSION:  Stercoral proctitis.  Small subpleural opacity right lung middle lobe may be infectious or   inflammatory.    Assessment and Plan:   89yo woman with PMH of HTN, CAD, MVprolapse, osteoporosis, dementia, hyperparathyroidism and anal prolapse sent to the ED for altered mental status and tachycardia.   CT chest showed possible pneumonia in Left lung with possibility of aspiration pneumonia    # AMS  # Aspiration pneumonia     - Blood cultures NGTD  - UC with Enterococcus   - Monitor Tmax, WBC and renal function  - Legionella and strep urinary antigen both negative   - Continue Ceftriaxone, today is the last day, completing 5days total for possible aspiration pneumonia  - Will add Linezolid 600mg po q12 to cover for E faecalis UTI (allergic to amoxicillin)   - Azithromycin was stopped.     Will follow.    Mita Perez MD  Division of Infectious Diseases   Please call ID service at [phone] with any question.    50 minutes spent on total encounter assessing patient, examination, chart review, counseling and coordinating care by the attending physician/nurse/care manager.

## 2023-09-18 NOTE — PROGRESS NOTE ADULT - SUBJECTIVE AND OBJECTIVE BOX
Patient is a 89y old  Female who presents with a chief complaint of Altered mental status (17 Sep 2023 15:03)       INTERVAL HPI/OVERNIGHT EVENTS: Patient  seen and examined at bedside. Awake, alert. Confused at baseline.     MEDICATIONS  (STANDING):  cefTRIAXone   IVPB 1000 milliGRAM(s) IV Intermittent every 24 hours  cholecalciferol 2000 Unit(s) Oral daily  dextrose 5%. 1000 milliLiter(s) (60 mL/Hr) IV Continuous <Continuous>  heparin   Injectable 5000 Unit(s) SubCutaneous every 12 hours  polyethylene glycol 3350 17 Gram(s) Oral daily  potassium chloride    Tablet ER 20 milliEquivalent(s) Oral daily  risperiDONE   Tablet 0.25 milliGRAM(s) Oral two times a day    MEDICATIONS  (PRN):  acetaminophen     Tablet .. 650 milliGRAM(s) Oral every 6 hours PRN Temp greater or equal to 38C (100.4F)      Allergies    Shrimp (Unknown)  penicillins (Hives)  Aricept (Unknown)  Onions (Unknown)  hydrochlorothiazide (Faint)  sertraline (Unknown)  amoxicillin (Unknown)  rivastigmine (Unknown)    Intolerances        REVIEW OF SYSTEMS:  Unable to obtain, confused   Vital Signs Last 24 Hrs  T(C): 36.4 (18 Sep 2023 05:22), Max: 36.6 (17 Sep 2023 20:32)  T(F): 97.5 (18 Sep 2023 05:22), Max: 97.9 (17 Sep 2023 20:32)  HR: 100 (18 Sep 2023 05:22) (96 - 100)  BP: 136/89 (18 Sep 2023 05:22) (130/80 - 136/89)  BP(mean): --  RR: 18 (18 Sep 2023 05:22) (18 - 18)  SpO2: 92% (18 Sep 2023 05:22) (92% - 93%)    Parameters below as of 18 Sep 2023 05:22  Patient On (Oxygen Delivery Method): room air        PHYSICAL EXAM:  GENERAL: NAD, confused  HEAD:  Atraumatic, Normocephalic  NECK: Supple  CHEST/LUNG: CTABL  HEART: S1+S2  SKIN: No rashes or lesions      LABS:            CAPILLARY BLOOD GLUCOSE        BLOOD CULTURE  09-14 @ 17:50   >100,000 CFU/ml Enterococcus faecalis  --  --  09-14 @ 15:55   No growth at 72 Hours  --  --  09-14 @ 15:50   No growth at 72 Hours  --  --    RADIOLOGY & ADDITIONAL TESTS:    Imaging Personally Reviewed:  [ ] YES     Consultant(s) Notes Reviewed:      Care Discussed with Consultants/Other Providers:

## 2023-09-18 NOTE — SOCIAL WORK PROGRESS NOTE - NSSWPROGRESSNOTE_GEN_ALL_CORE
sw spoke to [facility] (kang [phone]), per kang, they have reviewed documents sent to them for pt for hospice care at [facility], per kang, [facility] will evaluate pt for hospice services once pt has returned to [facility].

## 2023-09-18 NOTE — DISCHARGE NOTE PROVIDER - NSDCFUADDAPPT_GEN_ALL_CORE_FT
Please follow with your primary medical doctor at the [facility].    transfer to  hospices facility.

## 2023-09-18 NOTE — DISCHARGE NOTE PROVIDER - HOSPITAL COURSE
HPI:  89-year-old female with history of hypertension, mitral valve prolapse, osteoporosis, dementia, coronary artery disease, diverticulosis, hyperlipidemia, hyperparathyroidism, anal prolapse sent to the emergency department at [facility] today for questionable declining mental status and elevated heart rate today. Hx elicited from daughter Elvira who is also the health care proxy. Daughter states that last Tuesday pt fell 2/2 dehydration at the assisted living facility and has been declining ever since. Prior to this hospital visit, pt did not require a walker and knew her name and family members; however, now requires a walker and is A&Ox0. Today pt was sent into the ED for increasing lethargy.       ED course  Vitals: T 100.5, , /81, RR 18, SpO2 97% on RA  Significant labs: WBC 12.17, Na 156, BUN/Cr 43/1.7, lactate 1.4  RVP neg  UA shows moderate leuk esterase, WBC 21, few bacteria, +squamous cells  Imaging:   CT head shows Motion limited examination. Age-appropriate involutional changes. No acute intracranial pathology. Specifically, no acute intracranial hemorrhage.  CT chest/Abdomen pelvis shows Stercoral proctitis. Small subpleural opacity right lung middle lobe may be infectious or inflammatory.  EKG: Sinus Tach 117, RBBB; similar to previous   In ED patient given: 1L LR bolus x1, ofirmev x1, Rocephin 1g x1, Azithro 500mg x1    (14 Sep 2023 20:29)      ---  HOSPITAL COURSE: Patient admitted to medicine floor for management of     Pt seen and examined on day of discharge. Patient is medically optimized for discharge to home with close outpatient followup.    PHYSICAL EXAM ON DAY OF DISCHARGE:        ---  CONSULTANTS:     ---  TIME SPENT:  I, the attending physician, was physically present for the key portions of the evaluation and management (E/M) service provided. The total amount of time spent reviewing the hospital notes, laboratory values, imaging findings, assessing/counseling the patient, discussing with consultant physicians, social work, nursing staff was -- minutes    ---  Primary care provider was made aware of plan for discharge:      [  ] NO     [  ] YES   HPI:  89-year-old female with history of hypertension, mitral valve prolapse, osteoporosis, dementia, coronary artery disease, diverticulosis, hyperlipidemia, hyperparathyroidism, anal prolapse sent to the emergency department at [facility] today for questionable declining mental status and elevated heart rate today. Hx elicited from daughter Elvira who is also the health care proxy. Daughter states that last Tuesday pt fell 2/2 dehydration at the assisted living facility and has been declining ever since. Prior to this hospital visit, pt did not require a walker and knew her name and family members; however, now requires a walker and is A&Ox0. Today pt was sent into the ED for increasing lethargy.       ED course  Vitals: T 100.5, , /81, RR 18, SpO2 97% on RA  Significant labs: WBC 12.17, Na 156, BUN/Cr 43/1.7, lactate 1.4  RVP neg  UA shows moderate leuk esterase, WBC 21, few bacteria, +squamous cells  Imaging:   CT head shows Motion limited examination. Age-appropriate involutional changes. No acute intracranial pathology. Specifically, no acute intracranial hemorrhage.  CT chest/Abdomen pelvis shows Stercoral proctitis. Small subpleural opacity right lung middle lobe may be infectious or inflammatory.  EKG: Sinus Tach 117, RBBB; similar to previous   In ED patient given: 1L LR bolus x1, ofirmev x1, Rocephin 1g x1, Azithro 500mg x1    (14 Sep 2023 20:29)      ---  HOSPITAL COURSE: 89-year-old female with history of hypertension, mitral valve prolapse, osteoporosis, dementia, coronary artery disease, diverticulosis, hyperlipidemia, hyperparathyroidism, anal prolapse sent to the emergency department at [facility] for declining mental status and elevated heart rate. Admitted for sepsis sec to suspected sterco colitis and uti, metabolic encephalopathy, and napoleon. Family requested hospice and CMO only.  Patient is DNR/DNI       Pt seen and examined on day of discharge. Patient is medically optimized for discharge to home with close outpatient followup.    PHYSICAL EXAM ON DAY OF DISCHARGE:        ---  CONSULTANTS:     ---  TIME SPENT:  I, the attending physician, was physically present for the key portions of the evaluation and management (E/M) service provided. The total amount of time spent reviewing the hospital notes, laboratory values, imaging findings, assessing/counseling the patient, discussing with consultant physicians, social work, nursing staff was -- minutes    ---  Primary care provider was made aware of plan for discharge:      [  ] NO     [  ] YES   HPI:  89-year-old female with history of hypertension, mitral valve prolapse, osteoporosis, dementia, coronary artery disease, diverticulosis, hyperlipidemia, hyperparathyroidism, anal prolapse sent to the emergency department at [facility] today for questionable declining mental status and elevated heart rate today. Hx elicited from daughter Elvira who is also the health care proxy. Daughter states that last Tuesday pt fell 2/2 dehydration at the assisted living facility and has been declining ever since. Prior to this hospital visit, pt did not require a walker and knew her name and family members; however, now requires a walker and is A&Ox0. Today pt was sent into the ED for increasing lethargy.       ED course  Vitals: T 100.5, , /81, RR 18, SpO2 97% on RA  Significant labs: WBC 12.17, Na 156, BUN/Cr 43/1.7, lactate 1.4  RVP neg  UA shows moderate leuk esterase, WBC 21, few bacteria, +squamous cells  Imaging:   CT head shows Motion limited examination. Age-appropriate involutional changes. No acute intracranial pathology. Specifically, no acute intracranial hemorrhage.  CT chest/Abdomen pelvis shows Stercoral proctitis. Small subpleural opacity right lung middle lobe may be infectious or inflammatory.  EKG: Sinus Tach 117, RBBB; similar to previous   In ED patient given: 1L LR bolus x1, ofirmev x1, Rocephin 1g x1, Azithro 500mg x1    (14 Sep 2023 20:29)      ---  HOSPITAL COURSE: 89-year-old female with history of hypertension, mitral valve prolapse, osteoporosis, dementia, coronary artery disease, diverticulosis, hyperlipidemia, hyperparathyroidism, anal prolapse sent to the emergency department at [facility] for declining mental status and elevated heart rate. Admitted for sepsis poa  sec to uti  suspected also  sterco colitis and  acute metabolic encephalopathy  sec to uti  and napoleon    / sever hypernatremia  sec to dehydration  likely pre-renal in setting of decreased PO intake/dehydration  . Family requested hospice and CMO only.  Patient is DNR/DNI    no vital checks, no labs daily as per daughter, cont po  abx for now finished course for uti .  pt accepted by hospices facility .      PHYSICAL EXAM ON DAY OF DISCHARGE: 9/21/23  no vitals      PHYSICAL EXAM:  GENERAL: NAD, weak +  HEAD:  Atraumatic, Normocephalic  EYES: EOMI, PERRLA, conjunctiva and sclera clear  ENMT:  dry mucous membranes  NECK: Supple, No JVD  NERVOUS SYSTEM:  awake  Oriented X0; Motor Strength 5/5 B/L upper and lower extremities; DTRs 2+ intact and symmetric  CHEST/LUNG:  percussion bilaterally  coarse ; No rales, rhonchi, wheezing,  HEART: Regular rate and rhythm; No murmurs,   ABDOMEN: Soft, Nontender, Nondistended; Bowel sounds present  EXTREMITIES:  2+ Peripheral Pulses, No clubbing, cyanosis, or edema  SKIN: No rashes or lesions    ---  TIME SPENT:  I, the attending physician, was physically present for the key portions of the evaluation and management (E/M) service provided. The total amount of time spent reviewing the hospital notes, laboratory values, imaging findings, assessing/counseling the patient, discussing with consultant physicians, social work, nursing staff was --40 minutes

## 2023-09-18 NOTE — PROGRESS NOTE ADULT - ASSESSMENT
89-year-old female with history of hypertension, mitral valve prolapse, osteoporosis, dementia, coronary artery disease, diverticulosis, hyperlipidemia, hyperparathyroidism, anal prolapse sent to the emergency department at [facility] today for questionable declining mental status and elevated heart rate today. Admitted for sepsis sec to suspected sterco colitis and uti, metabolic encephalopathy, and keely            Problem/Plan - 1:  ·  Problem: Acute encephalopathy.   ·  Plan: Pt presents to the ED for altered mental status at assisted living facility  - likely secondary to worsening dementia vs acute infectious process   - Pt meets Sepsis criteria on admission (T100.5, , WBC >12, source)   - s/p 1L LR bolus x1, ofirmev x1, Rocephin 1g x1, Azithro 500mg x1   - CT head shows Motion limited examination. Age-appropriate involutional changes. No acute intracranial pathology. Specifically, no acute intracranial hemorrhage.  - CT chest/Abdomen pelvis shows Stercoral proctitis. Small subpleural opacity right lung middle lobe may be infectious or inflammatory.  -  RVP negative  - UA shows moderate leuk esterase, WBC 21, few bacteria, +squamous cells   - f/u Urine culture, strep, legionella   - f/u Blood Cx x2   - f/u repeat lactate   - f/u procal  - tylenol prn for fever   - continue rocephin and azithro  -bowel regimen for stercocolitis, consider GI recs if not improving  - f/u speech and swallow    - ID consulted, Dr. Perez, f/u recs.     Problem/Plan - 2:  ·  Problem: Hypernatremia.   ·  Plan: Na 156  - likely 2/2 decreased PO intake/dehydration  - start D5 60cc/hr for 6 hrs   - consider nephro consult if Na decreases too quickly or hypernatremia does not resolve.     Problem/Plan - 3:  ·  Problem: KEELY (acute kidney injury).   ·  Plan: Cr 1.7 on admission   - likely pre-renal in setting of decreased PO intake/dehydration   - continue D5 60cc/hr.     Problem/Plan - 4:  ·  Problem: HTN (hypertension).   ·  Plan: Chronic   - no longer on home losartan   - monitor hemodynamics.     Problem/Plan - 5:  ·  Problem: Dementia.   ·  Plan: Chronic   - continue home risperidone.     Problem/Plan - 6:  ·  Problem: Swelling of lower extremity.   ·  Plan: Chronic  - HOLD home lasix 40mg qd in setting of hypernatremia/KEELY  - follows with Dr. Ogden   - Echocardiography  9/26/2022 revealed severe left atrial enlargement the remainder of the cardiac chambers were normal in dimension. Mild concentric left ventricular hypertrophy was noted. Left ventricular systolic function was normal, with an estimated ejection fraction of 60 to 65%.     please palce sign for us eof hearing aid on door, p it cmo pending hospice eval    no vital checks, no labs daily asper daughter, cont abx for now. plan for hospice

## 2023-09-18 NOTE — PROGRESS NOTE ADULT - SUBJECTIVE AND OBJECTIVE BOX
[facility] -- Mili Ogden, Bernabe, Stuart Cyr, , Carl Walker  Office # [phone]    Follow Up:      Subjective/Observations:     REVIEW OF SYSTEMS: All other review of systems is negative unless indicated above  PAST MEDICAL & SURGICAL HISTORY:  History of Hypertension      Age Related Osteoporosis      Heart murmur      Otosclerosis      Fungal infection  left foot ; tx with cream ; x 3 weeks ; pt reports improvement      MVP (mitral valve prolapse)      [place] (hard of hearing)      Anal prolapse      H/O:  Section  , 0,       LIH Repair      Right Stapedectomy        Left Stapedectomy        Right  Cataract  Excision        Left Cataract        Surgical Treatment Otosclerosis   ? "Mobilization of Stapes"      Otosclerosis of right ear  S/P Stapedectomy- 10/12/1978      Otosclerosis of left ear  stapedectomy-       S/P hernia repair  ventral hernia with mesh- 10/2012      S/P left inguinal hernia repair  with mesh       S/P colonoscopy with polypectomy  times 2        MEDICATIONS  (STANDING):  cefTRIAXone   IVPB 1000 milliGRAM(s) IV Intermittent every 24 hours  cholecalciferol 2000 Unit(s) Oral daily  dextrose 5%. 1000 milliLiter(s) (60 mL/Hr) IV Continuous <Continuous>  heparin   Injectable 5000 Unit(s) SubCutaneous every 12 hours  polyethylene glycol 3350 17 Gram(s) Oral daily  potassium chloride    Tablet ER 20 milliEquivalent(s) Oral daily  risperiDONE   Tablet 0.25 milliGRAM(s) Oral two times a day    MEDICATIONS  (PRN):  acetaminophen     Tablet .. 650 milliGRAM(s) Oral every 6 hours PRN Temp greater or equal to 38C (100.4F)    Allergies    Shrimp (Unknown)  penicillins (Hives)  Aricept (Unknown)  Onions (Unknown)  hydrochlorothiazide (Faint)  sertraline (Unknown)  amoxicillin (Unknown)  rivastigmine (Unknown)    Intolerances      Vital Signs Last 24 Hrs  T(C): 36.4 (18 Sep 2023 05:22), Max: 36.6 (17 Sep 2023 20:32)  T(F): 97.5 (18 Sep 2023 05:22), Max: 97.9 (17 Sep 2023 20:32)  HR: 100 (18 Sep 2023 05:22) (96 - 100)  BP: 136/89 (18 Sep 2023 05:22) (130/80 - 136/89)  BP(mean): --  RR: 18 (18 Sep 2023 05:22) (18 - 18)  SpO2: 92% (18 Sep 2023 05:22) (92% - 93%)    Parameters below as of 18 Sep 2023 05:22  Patient On (Oxygen Delivery Method): room air      I&O's Summary      PHYSICAL EXAM:  TELE:   Constitutional: NAD, awake and alert, well-developed  HEENT: Moist Mucous Membranes, Anicteric  Pulmonary: Non-labored, breath sounds are clear bilaterally, No wheezing, rales or rhonchi  Cardiovascular: Regular, S1 and S2, No murmurs, rubs, gallops or clicks  Gastrointestinal: Bowel Sounds present, soft, nontender.   Lymph: No peripheral edema. No lymphadenopathy.  Skin: No visible rashes or ulcers.  Psych:  Mood & affect appropriate  LABS: All Labs Reviewed:          12 Lead ECG:   Ventricular Rate 117 BPM    Atrial Rate 117 BPM    P-R Interval 152 ms    QRS Duration 114 ms    Q-T Interval 330 ms    QTC Calculation(Bazett) 460 ms    P Axis 27 degrees    R Axis -80 degrees    T Axis 32 degrees    Diagnosis Line *** Poor data quality, interpretation may be adversely affected  Sinus tachycardia  Right bundle branch block  Left anterior fascicular block  *** Bifascicular block ***  Minimal voltage criteria for LVH, may be normal variant ( R in aVL )  Abnormal ECG  When compared with ECG of 05-SEP-2023 09:37,  No significant change was found  Confirmed by TERRANCE CYR ([id number]) on 9/15/2023 6:04:34 PM (23 @ 13:47)        [facility] -- Mili Ogden, Gio Kidd Savella, , Carl Walker  Office # [phone]    Follow Up:   MVP, CAD, AMS, EKG abnormality    Subjective/Observations: Awake and alert but very confused.  Unable to provide ROS.  Comfortable on RA.  Not in any form of distress    REVIEW OF SYSTEMS: All other review of systems is negative unless indicated above  PAST MEDICAL & SURGICAL HISTORY:  History of Hypertension  Age Related Osteoporosis  Heart murmur  Otosclerosis  Fungal infection  left foot ; tx with cream ; x 3 weeks ; pt reports improvement  MVP (mitral valve prolapse)  [place] (hard of hearing)  Anal prolapse  H/O:  Section  , ,   LIH Repair  Right Stapedectomy    Left Stapedectomy    Right  Cataract  Excision    Left Cataract    Surgical Treatment Otosclerosis   ? "Mobilization of Stapes"  Otosclerosis of right ear  S/P Stapedectomy- 10/12/1978  Otosclerosis of left ear  stapedectomy-   S/P hernia repair  ventral hernia with mesh- 10/2012  S/P left inguinal hernia repair  with mesh   S/P colonoscopy with polypectomy  times 2  MEDICATIONS  (STANDING):  cefTRIAXone   IVPB 1000 milliGRAM(s) IV Intermittent every 24 hours  cholecalciferol 2000 Unit(s) Oral daily  dextrose 5%. 1000 milliLiter(s) (60 mL/Hr) IV Continuous <Continuous>  heparin   Injectable 5000 Unit(s) SubCutaneous every 12 hours  polyethylene glycol 3350 17 Gram(s) Oral daily  potassium chloride    Tablet ER 20 milliEquivalent(s) Oral daily  risperiDONE   Tablet 0.25 milliGRAM(s) Oral two times a day    MEDICATIONS  (PRN):  acetaminophen     Tablet .. 650 milliGRAM(s) Oral every 6 hours PRN Temp greater or equal to 38C (100.4F)    Allergies    Shrimp (Unknown)  penicillins (Hives)  Aricept (Unknown)  Onions (Unknown)  hydrochlorothiazide (Faint)  sertraline (Unknown)  amoxicillin (Unknown)  rivastigmine (Unknown)    Intolerances    Vital Signs Last 24 Hrs  T(C): 36.4 (18 Sep 2023 05:22), Max: 36.6 (17 Sep 2023 20:32)  T(F): 97.5 (18 Sep 2023 05:22), Max: 97.9 (17 Sep 2023 20:32)  HR: 100 (18 Sep 2023 05:22) (96 - 100)  BP: 136/89 (18 Sep 2023 05:22) (130/80 - 136/89)  BP(mean): --  RR: 18 (18 Sep 2023 05:22) (18 - 18)  SpO2: 92% (18 Sep 2023 05:22) (92% - 93%)    Parameters below as of 18 Sep 2023 05:22  Patient On (Oxygen Delivery Method): room air    I&O's Summary       PHYSICAL EXAM:  TELE: Not on tele  Constitutional: NAD, awake and alert, well-developed  HEENT: Moist Mucous Membranes, Anicteric  Pulmonary: Non-labored, breath sounds are clear bilaterally, No wheezing, rales or rhonchi  Cardiovascular: Regular, S1 and S2, +murmurs, no rubs, gallops or clicks  Gastrointestinal: Bowel Sounds present, soft, nontender.   Lymph: Trace BLE edema. No lymphadenopathy.  Skin: No visible rashes or ulcers.  +ecchymosis left cheekbone  Psych:  Mood & affect: Flat, confused and disoriented    LABS: All Labs Reviewed:    12 Lead ECG:   Ventricular Rate 117 BPM    Atrial Rate 117 BPM    P-R Interval 152 ms    QRS Duration 114 ms    Q-T Interval 330 ms    QTC Calculation(Bazett) 460 ms    P Axis 27 degrees    R Axis -80 degrees    T Axis 32 degrees    Diagnosis Line *** Poor data quality, interpretation may be adversely affected  Sinus tachycardia  Right bundle branch block  Left anterior fascicular block  *** Bifascicular block ***  Minimal voltage criteria for LVH, may be normal variant ( R in aVL )  Abnormal ECG  When compared with ECG of 05-SEP-2023 09:37,  No significant change was found  Confirmed by TERRANCE CYR ([id number]) on 9/15/2023 6:04:34 PM (23 @ 13:47)

## 2023-09-19 VITALS — WEIGHT: 126.1 LBS

## 2023-09-19 PROCEDURE — 99232 SBSQ HOSP IP/OBS MODERATE 35: CPT

## 2023-09-19 PROCEDURE — 99233 SBSQ HOSP IP/OBS HIGH 50: CPT

## 2023-09-19 RX ADMIN — HEPARIN SODIUM 5000 UNIT(S): 5000 INJECTION INTRAVENOUS; SUBCUTANEOUS at 05:56

## 2023-09-19 RX ADMIN — RISPERIDONE 0.25 MILLIGRAM(S): 4 TABLET ORAL at 17:45

## 2023-09-19 RX ADMIN — LINEZOLID 600 MILLIGRAM(S): 600 INJECTION, SOLUTION INTRAVENOUS at 17:45

## 2023-09-19 NOTE — DIETITIAN INITIAL EVALUATION ADULT - NSICDXPASTMEDICALHX_GEN_ALL_CORE_FT
PAST MEDICAL HISTORY:  Age Related Osteoporosis     Anal prolapse     Fungal infection left foot ; tx with cream ; x 3 weeks ; pt reports improvement    Heart murmur     History of Hypertension     [place] (hard of hearing)     MVP (mitral valve prolapse)     Otosclerosis

## 2023-09-19 NOTE — PROGRESS NOTE ADULT - ASSESSMENT
89-year-old female with history of hypertension, mitral valve prolapse, osteoporosis, dementia, coronary artery disease, diverticulosis, hyperlipidemia, hyperparathyroidism, anal prolapse sent to the emergency department at [facility] today for questionable declining mental status and elevated heart rate today. Admitted for sepsis sec to suspected sterco colitis and uti, metabolic encephalopathy, and keely            Problem/Plan - 1:  ·  Problem: Acute encephalopathy.   -Family had decided about hospice and do not want aggressive measures w/u. They want her to be comfortable. They want hospice to be set up here prior to discharge, and are OK with [facility].   -Palliative Care team consulted.   ·  Plan: Pt presented  to the ED for altered mental status at assisted living facility  - likely secondary to worsening dementia and  acute infectious process   - CT head shows Motion limited examination. Age-appropriate involutional changes. No acute intracranial pathology. Specifically, no acute intracranial hemorrhage.  - CT chest/Abdomen pelvis shows Stercoral proctitis. Small subpleural opacity right lung middle lobe may be infectious or inflammatory.  -  RVP negative  - tylenol prn for fever   - continue rocephin and azithro  -bowel regimen for stercocolitis, consider GI recs if not improving  - On Zyvox x 3 more days   - ID consulted, Dr. Perez, f/u recs.     Problem/Plan - 2:  ·  Problem: Hypernatremia.   ·  Plan: Family has decided on comfort measures and no labs    Problem/Plan - 3:  ·  Problem: KEELY (acute kidney injury).   ·  Plan: Cr 1.7 on admission   - likely pre-renal in setting of decreased PO intake/dehydration   - continue D5 60cc/hr.     Problem/Plan - 4:  ·  Problem: HTN (hypertension).   ·  Plan: Chronic   - no longer on home losartan   - monitor hemodynamics.     Problem/Plan - 5:  ·  Problem: Dementia.   ·  Plan: Chronic   - continue home risperidone.     Problem/Plan - 6:  ·  Problem: Swelling of lower extremity.   ]-Plan for hospice, CMO      please palce sign for us eof hearing aid on door, p it cmo pending hospice eval    no vital checks, no labs daily asper daughter, cont abx for now. plan for hospice

## 2023-09-19 NOTE — DIETITIAN INITIAL EVALUATION ADULT - PERTINENT MEDS FT
MEDICATIONS  (STANDING):  cholecalciferol 2000 Unit(s) Oral daily  dextrose 5%. 1000 milliLiter(s) (60 mL/Hr) IV Continuous <Continuous>  heparin   Injectable 5000 Unit(s) SubCutaneous every 12 hours  linezolid    Tablet 600 milliGRAM(s) Oral every 12 hours  polyethylene glycol 3350 17 Gram(s) Oral daily  potassium chloride    Tablet ER 20 milliEquivalent(s) Oral daily  risperiDONE   Tablet 0.25 milliGRAM(s) Oral two times a day    MEDICATIONS  (PRN):  acetaminophen     Tablet .. 650 milliGRAM(s) Oral every 6 hours PRN Temp greater or equal to 38C (100.4F)

## 2023-09-19 NOTE — PROGRESS NOTE ADULT - SUBJECTIVE AND OBJECTIVE BOX
[facility] -- Mili Ogden, Bernabe, Gio, Stuart, Aaron Henry: Office # [phone]    Follow Up: MVP, CAD, AMS, EKG abnormality    Subjective/Observations: Patient seen and examined. Pt sleeping, per notes, very confused.  Unable to provide ROS.  Comfortable on RA.  Not in any form of distress    REVIEW OF SYSTEMS: All other review of systems are negative unless indicated above    PAST MEDICAL & SURGICAL HISTORY:  History of Hypertension      Age Related Osteoporosis      Heart murmur      Otosclerosis      Fungal infection  left foot ; tx with cream ; x 3 weeks ; pt reports improvement      MVP (mitral valve prolapse)      [place] (hard of hearing)      Anal prolapse      H/O:  Section  , 0,       LIH Repair      Right Stapedectomy        Left Stapedectomy        Right  Cataract  Excision        Left Cataract        Surgical Treatment Otosclerosis   ? "Mobilization of Stapes"      Otosclerosis of right ear  S/P Stapedectomy- 10/12/1978      Otosclerosis of left ear  stapedectomy-       S/P hernia repair  ventral hernia with mesh- 10/2012      S/P left inguinal hernia repair  with mesh       S/P colonoscopy with polypectomy  times 2    MEDICATIONS  (STANDING):  cholecalciferol 2000 Unit(s) Oral daily  dextrose 5%. 1000 milliLiter(s) (60 mL/Hr) IV Continuous <Continuous>  heparin   Injectable 5000 Unit(s) SubCutaneous every 12 hours  linezolid    Tablet 600 milliGRAM(s) Oral every 12 hours  polyethylene glycol 3350 17 Gram(s) Oral daily  potassium chloride    Tablet ER 20 milliEquivalent(s) Oral daily  risperiDONE   Tablet 0.25 milliGRAM(s) Oral two times a day    MEDICATIONS  (PRN):  acetaminophen     Tablet .. 650 milliGRAM(s) Oral every 6 hours PRN Temp greater or equal to 38C (100.4F)    Allergies    Shrimp (Unknown)  penicillins (Hives)  Aricept (Unknown)  Onions (Unknown)  hydrochlorothiazide (Faint)  sertraline (Unknown)  amoxicillin (Unknown)  rivastigmine (Unknown)    Intolerances      Vital Signs Last 24 Hrs  T(C): 36.5 (18 Sep 2023 13:16), Max: 36.5 (18 Sep 2023 13:16)  T(F): 97.7 (18 Sep 2023 13:16), Max: 97.7 (18 Sep 2023 13:16)  HR: 96 (18 Sep 2023 13:16) (96 - 96)  BP: 121/66 (18 Sep 2023 13:16) (121/66 - 121/66)  BP(mean): --  RR: 18 (18 Sep 2023 13:16) (18 - 18)  SpO2: 91% (18 Sep 2023 13:16) (91% - 91%)    Parameters below as of 18 Sep 2023 13:16  Patient On (Oxygen Delivery Method): room air      I&O's Summary        TELE: Not on telemetry   PHYSICAL EXAM:  Constitutional: NAD, awake and alert  HEENT: Moist Mucous Membranes, Anicteric  Pulmonary: Non-labored, breath sounds are clear bilaterally, No wheezing, rales or rhonchi  Cardiovascular: Regular, S1 and S2, + murmurs, No rubs, gallops or clicks  Gastrointestinal:  soft, nontender, nondistended   Lymph: trace peripheral edema. No lymphadenopathy.   Skin: No visible rashes or ulcers.  Psych:  confused       LABS: All Labs Reviewed:    12 Lead ECG:   Ventricular Rate 117 BPM    Atrial Rate 117 BPM    P-R Interval 152 ms    QRS Duration 114 ms    Q-T Interval 330 ms    QTC Calculation(Bazett) 460 ms    P Axis 27 degrees    R Axis -80 degrees    T Axis 32 degrees    Diagnosis Line *** Poor data quality, interpretation may be adversely affected  Sinus tachycardia  Right bundle branch block  Left anterior fascicular block  *** Bifascicular block ***  Minimal voltage criteria for LVH, may be normal variant ( R in aVL )  Abnormal ECG  When compared with ECG of 05-SEP-2023 09:37,  No significant change was found  Confirmed by TERRANCE CYR ([id number]) on 9/15/2023 6:04:34 PM (23 @ 13:47)

## 2023-09-19 NOTE — PROGRESS NOTE ADULT - ASSESSMENT
89-year-old female with history of hypertension, mitral valve prolapse, osteoporosis, dementia, coronary artery disease, diverticulosis, hyperlipidemia, hyperparathyroidism, anal prolapse sent to the emergency department at [facility] today for questionable declining mental status and elevated heart rate today, a/f sepsis sec to suspected sterco colitis and uti, metabolic encephalopathy, and napoleon     CAD, VHD, AMS, Bifascicular block  - p/w AMS with tachycardia, now resolved   - Tachy likely 2/2 infectious process, dehydration, sepsis (met criteria) work up ongoing   - CT Chest: stercoral proctitis , small subpleural opacity Right Lung middle lobe may be infectious or inflammatory   - ID following, continue antibiotics     - Known hx of CAD, follows Dr Ogden (o/p cardiologist)   - EKG: ST with RBBB, nonspecific ST/ TWA, unchanged from prior  - With bifascicular block.  No evidence of AV block noted     - TTE  9/26/2022:  EF 63%, mod AS, severely dilated LA, mild DD  - May obtain routine TTE while admitted to assess  AS, however pt is not a candidate for any aggressive interventions.   - No sign of volume overload.  She is in fact intravascularly dry.    - Holding diuretics. LE edema is chronic and is only trace  - Remains hypernatremic. Encourage PO fluids  - Sodium trend: <--155, <--156    - BP stable and controlled     - Plan for hospice   - Monitor and replete lytes, keep K>4, Mg>2.  - Will continue to follow.    Bin Anderson, MS FNP, AGACNP  Nurse Practitioner- Cardiology   Please call on TEAMS   89-year-old female with history of hypertension, mitral valve prolapse, osteoporosis, dementia, coronary artery disease, diverticulosis, hyperlipidemia, hyperparathyroidism, anal prolapse sent to the emergency department at [facility] today for questionable declining mental status and elevated heart rate today, a/f sepsis sec to suspected sterco colitis and uti, metabolic encephalopathy, and napoleon     CAD, VHD, AMS, Bifascicular block  - p/w AMS with tachycardia, now resolved   - Tachy likely 2/2 infectious process, dehydration, sepsis (met criteria) work up ongoing   - CT Chest: stercoral proctitis , small subpleural opacity Right Lung middle lobe may be infectious or inflammatory     - Known hx of CAD, follows Dr Ogden (o/p cardiologist)   - EKG: ST with RBBB, nonspecific ST/ TWA, unchanged from prior  - With bifascicular block.  No evidence of AV block noted     - TTE  9/26/2022:  EF 63%, mod AS, severely dilated LA, mild DD  - May obtain routine TTE while admitted to assess  AS, however pt is not a candidate for any aggressive interventions.   - No sign of volume overload.  She is in fact intravascularly dry.    - Holding diuretics. LE edema is chronic and is only trace  - Remains hypernatremic. Encourage PO fluids  - Sodium trend: <--155, <--156    - BP stable and controlled     - Plan for hospice, now comfort care.  - Will sign off given comfort measures only  - Continue care per primary team. Please reconsult if needed   - Monitor and replete lytes, keep K>4, Mg>2.  - Will continue to follow.    Bin Anderson, MS FNP, AGACNP  Nurse Practitioner- Cardiology   Please call on TEAMS

## 2023-09-19 NOTE — DIETITIAN INITIAL EVALUATION ADULT - OTHER INFO
89-year-old female with history of hypertension, mitral valve prolapse, osteoporosis, dementia, coronary artery disease, diverticulosis, hyperlipidemia, hyperparathyroidism, anal prolapse sent to the emergency department at [facility] today for questionable declining mental status and elevated heart rate today. Admitted for sepsis sec to suspected sterco colitis and uti, metabolic encephalopathy, and napoleon            Problem/Plan - 1:  ·  Problem: Acute encephalopathy.   -Family had decided about hospice and do not want aggressive measures w/u. They want her to be comfortable. They want hospice to be set up here prior to discharge, and are OK with [facility] hospice.    89-year-old female with history of hypertension, mitral valve prolapse, osteoporosis, dementia, coronary artery disease, diverticulosis, hyperlipidemia, hyperparathyroidism, anal prolapse sent to the emergency department at [facility] today for questionable declining mental status and elevated heart rate today. Admitted for sepsis sec to suspected sterco colitis and uti, metabolic encephalopathy, and napoleon   Pt is comfort measures, family considering hospice at [facility]. PO intake is poor. Pt is unable to have a conversation             Problem/Plan - 1:  ·  Problem: Acute encephalopathy.   -Family had decided about hospice and do not want aggressive measures w/u. They want her to be comfortable. They want hospice to be set up here prior to discharge, and are OK with [facility].

## 2023-09-19 NOTE — DIETITIAN INITIAL EVALUATION ADULT - PROBLEM SELECTOR PLAN 2
Na 156  - likely 2/2 decreased PO intake/dehydration  - start D5 60cc/hr for 6 hrs   - consider nephro consult if Na decreases too quickly or hypernatremia does not resolve

## 2023-09-19 NOTE — PROGRESS NOTE ADULT - NS ATTEND AMEND GEN_ALL_CORE FT
I have personally seen and examined the patient in detail.  I have spoken to the provider regarding the assessment and plan of care.  I have made changes to the note accordingly.
I have personally seen and examined the patient in detail.  I have spoken to the provider regarding the assessment and plan of care.  I have made changes to the note accordingly.
history of hypertension, mitral valve prolapse, osteoporosis, dementia, coronary artery disease, diverticulosis, hyperlipidemia, hyperparathyroidism   elevated hr in the setting of stercoral proctitis  no bifasc block without more advanced av block  known moderate AS with normal ef in past, not a candidate for av intervention
history of hypertension, mitral valve prolapse, osteoporosis, dementia, coronary artery disease, diverticulosis, hyperlipidemia, hyperparathyroidism   elevated hr in the setting of stercoral proctitis  now bifasc block without more advanced av block  known moderate AS with normal ef in past, not a candidate for av intervention.

## 2023-09-19 NOTE — PROGRESS NOTE ADULT - SUBJECTIVE AND OBJECTIVE BOX
Patient is a 89y old  Female who presents with a chief complaint of Altered mental status (19 Sep 2023 08:08)       INTERVAL HPI/OVERNIGHT EVENTS: Patient seen and examined at bedside. Lethargic but responsive     MEDICATIONS  (STANDING):  cholecalciferol 2000 Unit(s) Oral daily  dextrose 5%. 1000 milliLiter(s) (60 mL/Hr) IV Continuous <Continuous>  heparin   Injectable 5000 Unit(s) SubCutaneous every 12 hours  linezolid    Tablet 600 milliGRAM(s) Oral every 12 hours  polyethylene glycol 3350 17 Gram(s) Oral daily  potassium chloride    Tablet ER 20 milliEquivalent(s) Oral daily  risperiDONE   Tablet 0.25 milliGRAM(s) Oral two times a day    MEDICATIONS  (PRN):  acetaminophen     Tablet .. 650 milliGRAM(s) Oral every 6 hours PRN Temp greater or equal to 38C (100.4F)      Allergies    Shrimp (Unknown)  penicillins (Hives)  Aricept (Unknown)  Onions (Unknown)  hydrochlorothiazide (Faint)  sertraline (Unknown)  amoxicillin (Unknown)  rivastigmine (Unknown)    Intolerances        REVIEW OF SYSTEMS:  Unable to obtain due to lethargy   Vital Signs Last 24 Hrs  T(C): 36.5 (18 Sep 2023 13:16), Max: 36.5 (18 Sep 2023 13:16)  T(F): 97.7 (18 Sep 2023 13:16), Max: 97.7 (18 Sep 2023 13:16)  HR: 96 (18 Sep 2023 13:16) (96 - 96)  BP: 121/66 (18 Sep 2023 13:16) (121/66 - 121/66)  BP(mean): --  RR: 18 (18 Sep 2023 13:16) (18 - 18)  SpO2: 91% (18 Sep 2023 13:16) (91% - 91%)    Parameters below as of 18 Sep 2023 13:16  Patient On (Oxygen Delivery Method): room air        PHYSICAL EXAM:  GENERAL: NAD, confused, lethargic   HEAD:  Atraumatic, Normocephalic  NECK: Supple  CHEST/LUNG: CTABL  HEART: S1+S2  SKIN: No rashes or lesions      LABS:            CAPILLARY BLOOD GLUCOSE        BLOOD CULTURE    RADIOLOGY & ADDITIONAL TESTS:    Imaging Personally Reviewed:  [ ] YES     Consultant(s) Notes Reviewed:      Care Discussed with Consultants/Other Providers:

## 2023-09-19 NOTE — DIETITIAN INITIAL EVALUATION ADULT - ORAL INTAKE PTA/DIET HISTORY
pt unable to provide  she receives meals at [employer] [facility] in [place]

## 2023-09-19 NOTE — GOALS OF CARE CONVERSATION - ADVANCED CARE PLANNING - CONVERSATION DETAILS
PC SW left message for  at [facility] to discuss home hospice services in [facility] facility. PC team to follow up. Palliative care SW left message for  at [facility] to discuss home hospice services in [facility] facility. Awaiting call back. PC team to follow up.

## 2023-09-19 NOTE — DIETITIAN INITIAL EVALUATION ADULT - PROBLEM SELECTOR PLAN 6
Chronic  - HOLD home lasix 40mg qd in setting of hypernatremia/KEELY  - follows with Dr. Ogden   - Echocardiography  9/26/2022 revealed severe left atrial enlargement the remainder of the cardiac chambers were normal in dimension. Mild concentric left ventricular hypertrophy was noted. Left ventricular systolic function was normal, with an estimated ejection fraction of 60 to 65%.

## 2023-09-19 NOTE — DIETITIAN INITIAL EVALUATION ADULT - PROBLEM SELECTOR PLAN 1
Pt presents to the ED for altered mental status at assisted living facility  - likely secondary to worsening dementia vs acute infectious process   - Pt meets Sepsis criteria on admission (T100.5, , WBC >12, source)   - s/p 1L LR bolus x1, ofirmev x1, Rocephin 1g x1, Azithro 500mg x1   - CT head shows Motion limited examination. Age-appropriate involutional changes. No acute intracranial pathology. Specifically, no acute intracranial hemorrhage.  - CT chest/Abdomen pelvis shows Stercoral proctitis. Small subpleural opacity right lung middle lobe may be infectious or inflammatory.  -  RVP negative  - UA shows moderate leuk esterase, WBC 21, few bacteria, +squamous cells   - f/u Urine culture, strep, legionella   - f/u Blood Cx x2   - f/u repeat lactate   - f/u procal  - tylenol prn for fever   - continue rocephin and azithro  -bowel regimen for stercocolitis, consider GI recs if not improving  - f/u speech and swallow    - ID consulted, Dr. Perez, f/u recs

## 2023-09-19 NOTE — PROGRESS NOTE ADULT - SUBJECTIVE AND OBJECTIVE BOX
[facility]  INFECTIOUS DISEASES   [address] [place] [address]  Tel: [phone]     Fax: [phone]  ========================================================  MD Nicole Kerns Kaushal, MD Cho, Michelle, MD Sunjit, Jaspal, MD  ========================================================    [id number]  TUNG MILLER     Follow up: Possible Aspiration pneumonia and UTI?    Lying in bed, seems comfortable, no fever, awake but not oriented.     PAST MEDICAL & SURGICAL HISTORY:  History of Hypertension  Age Related Osteoporosis  Heart murmur  Otosclerosis  Fungal infection  left foot ; tx with cream ; x 3 weeks ; pt reports improvement  MVP (mitral valve prolapse)  [place] (hard of hearing)  Anal prolapse  H/O:  Section  , 0,   LIH Repair  Right Stapedectomy    Left Stapedectomy    Right  Cataract  Excision    Left Cataract    Surgical Treatment Otosclerosis   ? "Mobilization of Stapes"  Otosclerosis of right ear  S/P Stapedectomy- 10/12/1978  Otosclerosis of left ear  stapedectomy-   S/P hernia repair  ventral hernia with mesh- 10/2012  S/P left inguinal hernia repair  with mesh   S/P colonoscopy with polypectomy  times 2    Social Hx: No smoking, EtOH or drugs     FAMILY HISTORY: Noncontributory     Allergies  Shrimp (Unknown)  penicillins (Hives)  Aricept (Unknown)  Onions (Unknown)  hydrochlorothiazide (Faint)  sertraline (Unknown)  amoxicillin (Unknown)  rivastigmine (Unknown)    MEDICATIONS  (STANDING):  cefTRIAXone   IVPB 1000 milliGRAM(s) IV Intermittent every 24 hours  cholecalciferol 2000 Unit(s) Oral daily  dextrose 5%. 1000 milliLiter(s) (60 mL/Hr) IV Continuous <Continuous>  heparin   Injectable 5000 Unit(s) SubCutaneous every 12 hours  polyethylene glycol 3350 17 Gram(s) Oral daily  potassium chloride    Tablet ER 20 milliEquivalent(s) Oral daily  risperiDONE   Tablet 0.25 milliGRAM(s) Oral two times a day     REVIEW OF SYSTEMS:  Unable     Physical Exam:  Vital Signs Last 24 Hrs  T(C): 36.5 (18 Sep 2023 13:16), Max: 36.5 (18 Sep 2023 13:16)  T(F): 97.7 (18 Sep 2023 13:16), Max: 97.7 (18 Sep 2023 13:16)  HR: 96 (18 Sep 2023 13:16) (96 - 96)  BP: 121/66 (18 Sep 2023 13:16) (121/66 - 121/66)  BP(mean): --  RR: 18 (18 Sep 2023 13:16) (18 - 18)  SpO2: 91% (18 Sep 2023 13:16) (91% - 91%)  Parameters below as of 18 Sep 2023 13:16  Patient On (Oxygen Delivery Method): room air  GEN: NAD  HEENT: normocephalic and atraumatic.   NECK: Supple.  No lymphadenopathy   LUNGS: Coarse breath sounds with scattered rhonchi   HEART: Regular rate and rhythm   ABDOMEN: Soft, nontender, and nondistended.  Positive bowel sounds.    EXTREMITIES: Without edema.  NEUROLOGIC: unable   PSYCHIATRIC: lethargic  SKIN: No rash       Labs:  Culture - Urine (collected 23 @ 17:50)  Source: Clean Catch Clean Catch (Midstream)  Final Report (23 @ 22:52):    >100,000 CFU/ml Enterococcus faecalis  Organism: Enterococcus faecalis (23 @ 22:52)  Organism: Enterococcus faecalis (23 @ 22:52)    Sensitivities:      Method Type: ABBY      -  Ampicillin: S <=2 Predicts results to ampicillin/sulbactam, amoxacillin-clavulanate and  piperacillin-tazobactam.      -  Ciprofloxacin: S <=1      -  Levofloxacin: S 1      -  Nitrofurantoin: S <=32 Should not be used to treat pyelonephritis.      -  Tetracycline: R >8      -  Vancomycin: S 2    Culture - Blood (collected 23 @ 15:55)  Source: .Blood Blood-Peripheral    Culture - Blood (collected 23 @ 15:50)  Source: .Blood Blood-Peripheral    WBC Count: 9.36 K/uL (09-15-23 @ 08:20)  WBC Count: 12.17 K/uL (23 @ 15:55)    Creatinine: 1.40 mg/dL (09-15-23 @ 08:20)  Creatinine: 1.70 mg/dL (23 @ 15:55)    Procalcitonin, Serum: 0.09 ng/mL (09-15-23 @ 08:20)     SARS-CoV-2 Result: NotDetec (23 @ 15:55)  SARS-CoV-2 Result: NotDetec (23 @ 10:00)    All imaging and other data have been reviewed.  < from: CT Abdomen and Pelvis No Cont (23 @ 18:04) >  IMPRESSION:  Stercoral proctitis.  Small subpleural opacity right lung middle lobe may be infectious or   inflammatory.    Assessment and Plan:   89yo woman with PMH of HTN, CAD, MVprolapse, osteoporosis, dementia, hyperparathyroidism and anal prolapse sent to the ED for altered mental status and tachycardia.   CT chest showed possible pneumonia in Left lung with possibility of aspiration pneumonia  Legionella neg so azithromycin was stopped.     # AMS  # Aspiration pneumonia     - Blood cultures NGTD  - UC with Enterococcus   - Monitor Tmax, WBC and renal function  - Legionella and strep urinary antigen both negative   - Started on Linezolid 600mg po q12 for E faecalis UTI (allergic to amoxicillin) on   - Completed 5days of ceftriaxone for possible pneumonia    Will follow PRN.    Mita Perez MD  Division of Infectious Diseases   Please call ID service at [phone] with any question.    50 minutes spent on total encounter assessing patient, examination, chart review, counseling and coordinating care by the attending physician/nurse/care manager.

## 2023-09-19 NOTE — GOALS OF CARE CONVERSATION - ADVANCED CARE PLANNING - CONVERSATION DETAILS
Palliative care SW received follow up call from cA at [facility]. Ac stated that [facility] also works with [facility] and stated that they do not work with [facility]. SW spoke with  at [facility], ([phone]) who states that patient can be evaluated at hospital prior to return to [facility]. Daughter, Elvira, made aware of option for [facility] and agreed to referral being made. Unit SW to make referral. All questions answered. PC team remains available.

## 2023-09-20 PROCEDURE — 99232 SBSQ HOSP IP/OBS MODERATE 35: CPT

## 2023-09-20 PROCEDURE — 99233 SBSQ HOSP IP/OBS HIGH 50: CPT | Mod: GC

## 2023-09-20 RX ADMIN — LINEZOLID 600 MILLIGRAM(S): 600 INJECTION, SOLUTION INTRAVENOUS at 21:19

## 2023-09-20 RX ADMIN — Medication 2000 UNIT(S): at 13:07

## 2023-09-20 RX ADMIN — LINEZOLID 600 MILLIGRAM(S): 600 INJECTION, SOLUTION INTRAVENOUS at 04:38

## 2023-09-20 RX ADMIN — RISPERIDONE 0.25 MILLIGRAM(S): 4 TABLET ORAL at 04:38

## 2023-09-20 RX ADMIN — RISPERIDONE 0.25 MILLIGRAM(S): 4 TABLET ORAL at 21:19

## 2023-09-20 RX ADMIN — POLYETHYLENE GLYCOL 3350 17 GRAM(S): 17 POWDER, FOR SOLUTION ORAL at 13:07

## 2023-09-20 RX ADMIN — Medication 20 MILLIEQUIVALENT(S): at 13:07

## 2023-09-20 NOTE — PROGRESS NOTE ADULT - SUBJECTIVE AND OBJECTIVE BOX
[facility]  INFECTIOUS DISEASES   [address] [place] [address]  Tel: [phone]     Fax: [phone]  ========================================================  MD Nicole Kerns Kaushal, MD Cho, Michelle, MD Sunjit, Jaspal, MD  ========================================================    [id number]  TUNG MILLER     Follow up: Possible Aspiration pneumonia and UTI?    Lying in bed, seems comfortable, no fever, lethargic not oriented.     PAST MEDICAL & SURGICAL HISTORY:  History of Hypertension  Age Related Osteoporosis  Heart murmur  Otosclerosis  Fungal infection  left foot ; tx with cream ; x 3 weeks ; pt reports improvement  MVP (mitral valve prolapse)  [place] (hard of hearing)  Anal prolapse  H/O:  Section  1957, 0,   LIH Repair  Right Stapedectomy    Left Stapedectomy    Right  Cataract  Excision    Left Cataract    Surgical Treatment Otosclerosis   ? "Mobilization of Stapes"  Otosclerosis of right ear  S/P Stapedectomy- 10/12/1978  Otosclerosis of left ear  stapedectomy-   S/P hernia repair  ventral hernia with mesh- 10/2012  S/P left inguinal hernia repair  with mesh   S/P colonoscopy with polypectomy  times 2    Social Hx: No smoking, EtOH or drugs     FAMILY HISTORY: Noncontributory     Allergies  Shrimp (Unknown)  penicillins (Hives)  Aricept (Unknown)  Onions (Unknown)  hydrochlorothiazide (Faint)  sertraline (Unknown)  amoxicillin (Unknown)  rivastigmine (Unknown)    MEDICATIONS  (STANDING):  cefTRIAXone   IVPB 1000 milliGRAM(s) IV Intermittent every 24 hours  cholecalciferol 2000 Unit(s) Oral daily  dextrose 5%. 1000 milliLiter(s) (60 mL/Hr) IV Continuous <Continuous>  heparin   Injectable 5000 Unit(s) SubCutaneous every 12 hours  polyethylene glycol 3350 17 Gram(s) Oral daily  potassium chloride    Tablet ER 20 milliEquivalent(s) Oral daily  risperiDONE   Tablet 0.25 milliGRAM(s) Oral two times a day     REVIEW OF SYSTEMS:  Unable     Physical Exam:  Vital Signs Last 24 Hrs  T(C): 36.5 (18 Sep 2023 13:16), Max: 36.5 (18 Sep 2023 13:16)  T(F): 97.7 (18 Sep 2023 13:16), Max: 97.7 (18 Sep 2023 13:16)  HR: 96 (18 Sep 2023 13:16) (96 - 96)  BP: 121/66 (18 Sep 2023 13:16) (121/66 - 121/66)  BP(mean): --  RR: 18 (18 Sep 2023 13:16) (18 - 18)  SpO2: 91% (18 Sep 2023 13:16) (91% - 91%)  Parameters below as of 18 Sep 2023 13:16  Patient On (Oxygen Delivery Method): room air  GEN: NAD  HEENT: normocephalic and atraumatic.   NECK: Supple.  No lymphadenopathy   LUNGS: Coarse breath sounds with scattered rhonchi   HEART: Regular rate and rhythm   ABDOMEN: Soft, nontender, and nondistended.  Positive bowel sounds.    EXTREMITIES: Without edema.  NEUROLOGIC: unable   PSYCHIATRIC: lethargic  SKIN: No rash       Labs:            Culture - Urine (collected 23 @ 17:50)  Source: Clean Catch Clean Catch (Midstream)  Final Report (23 @ 22:52):    >100,000 CFU/ml Enterococcus faecalis  Organism: Enterococcus faecalis (23 @ 22:52)  Organism: Enterococcus faecalis (23 @ 22:52)    Sensitivities:      Method Type: ABBY      -  Ampicillin: S <=2 Predicts results to ampicillin/sulbactam, amoxacillin-clavulanate and  piperacillin-tazobactam.      -  Ciprofloxacin: S <=1      -  Levofloxacin: S 1      -  Nitrofurantoin: S <=32 Should not be used to treat pyelonephritis.      -  Tetracycline: R >8      -  Vancomycin: S 2    Culture - Blood (collected 23 @ 15:55)  Source: .Blood Blood-Peripheral  Final Report (23 @ 22:00):    No growth at 5 days    Culture - Blood (collected 23 @ 15:50)  Source: .Blood Blood-Peripheral  Final Report (23 @ 22:00):    No growth at 5 days    Procalcitonin, Serum: 0.09 ng/mL (09-15-23 @ 08:20)     SARS-CoV-2 Result: NotDetec (23 @ 15:55)  SARS-CoV-2 Result: NotDetec (23 @ 10:00)    All imaging and other data have been reviewed.  < from: CT Abdomen and Pelvis No Cont (23 @ 18:04) >  IMPRESSION:  Stercoral proctitis.  Small subpleural opacity right lung middle lobe may be infectious or   inflammatory.    Assessment and Plan:   89yo woman with PMH of HTN, CAD, MVprolapse, osteoporosis, dementia, hyperparathyroidism and anal prolapse sent to the ED for altered mental status and tachycardia.   CT chest showed possible pneumonia in Left lung with possibility of aspiration pneumonia  Legionella neg so azithromycin was stopped.     # AMS  # Aspiration pneumonia     - Blood cultures NGTD  - UC with Enterococcus S to all   - Legionella and strep urinary antigen both negative   - Started on Linezolid 600mg po q12 for E faecalis UTI (allergic to amoxicillin) on   - Tomorrow las tday  - Completed 5days of ceftriaxone for possible pneumonia  - Awaiting Hospice referral     Will sign off please call with any question.     Mita Perez MD  Division of Infectious Diseases   Please call ID service at [phone] with any question.    50 minutes spent on total encounter assessing patient, examination, chart review, counseling and coordinating care by the attending physician/nurse/care manager.

## 2023-09-20 NOTE — SOCIAL WORK PROGRESS NOTE - NSSWPROGRESSNOTE_GEN_ALL_CORE
David Lozano from [facility] pt has been accepted and consent has been received, DME/hospital bed, mattress and table will be ordered. Per RN pt is not on oxygen. TODD reached out to [facility] department at ([phone]) in order to provide update , TODD left a VM for Nella.

## 2023-09-20 NOTE — PROGRESS NOTE ADULT - ASSESSMENT
89-year-old female with history of hypertension, mitral valve prolapse, osteoporosis, dementia, coronary artery disease, diverticulosis, hyperlipidemia, hyperparathyroidism, anal prolapse sent to the emergency department at [facility] today for questionable declining mental status and elevated heart rate today. Admitted for sepsis poa  sec to suspected sterco colitis and uti, metabolic encephalopathy, and keely            Problem/Plan - 1:  ·  Problem: Acute encephalopathy.   -Family had decided about hospice and do not want aggressive measures w/u. They want her to be comfortable. They want hospice to be set up here prior to discharge, and are OK with [facility].   -Palliative Care team consulted.   ·  Plan: Pt presented  to the ED for altered mental status at assisted living facility  - likely secondary to worsening dementia and  acute infectious process   - CT head shows Motion limited examination. Age-appropriate involutional changes. No acute intracranial pathology. Specifically, no acute intracranial hemorrhage.  - CT chest/Abdomen pelvis shows Stercoral proctitis. Small subpleural opacity right lung middle lobe may be infectious or inflammatory.  -  RVP negative ,  tylenol prn for fever   - s/p  Rocephin and azithromycin finished  -bowel regimen for stercocolitis,  - On Zyvox x 3 more days   - ID consulted, Dr. Perez,  Plan for hospice, CMO      Problem/Plan - 2:  ·  Problem: Hypernatremia.   ·  Plan: Family has decided on comfort measures and no labs    Problem/Plan - 3:  ·  Problem: KEELY (acute kidney injury).   ·  Plan: Cr 1.7 on admission   - likely pre-renal in setting of decreased PO intake/dehydration        Problem/Plan - 4:  ·  Problem: HTN (hypertension).   ·  Plan: Chronic   -Plan for hospice, CMO      Problem/Plan - 5:  ·  Problem: Dementia.   ·  Plan: Plan for hospice, CMO      Problem/Plan - 6:  ·  Problem: Swelling of lower extremity.   ]-Plan for hospice, CMO   hospice evaluation.     no vital checks, no labs daily as per daughter, cont po  abx for now. plan for hospice

## 2023-09-20 NOTE — SOCIAL WORK PROGRESS NOTE - NSSWPROGRESSNOTE_GEN_ALL_CORE
referral was made to [facility] for hospice at longterm. Carolann received call from Marta Miller  [phone] confirming referral received bu [facility] and evaluation underway . Matra to contact hcp in re to referral. Marta confirmed that eval would be done prior to pt ret to longterm which was family concern. Sw to follow w Marta and [facility] in re to eval and anticipated ret of pt to [facility] w hospice services.

## 2023-09-20 NOTE — PROGRESS NOTE ADULT - PROVIDER SPECIALTY LIST ADULT
Cardiology
Cardiology
Infectious Disease
Cardiology
Hospitalist
Hospitalist
Infectious Disease
Cardiology
Infectious Disease
Hospitalist
Internal Medicine
Hospitalist
Internal Medicine

## 2023-09-20 NOTE — PROGRESS NOTE ADULT - TIME BILLING
Note written by attending. Meds, lab, vitals, chart reviewed.
Note written by attending. Meds, lab, vitals, chart reviewed. D/w daughter Iris and rest of the family. Palliative care team / hospice  care evaluation going on  .
Note written by attending. Meds, lab, vitals, chart reviewed. D/w daughter Iris and rest of the family. Palliative care team has been consulted

## 2023-09-20 NOTE — PROGRESS NOTE ADULT - SUBJECTIVE AND OBJECTIVE BOX
Patient is a 89y old  Female who presents with a chief complaint of Altered mental status (19 Sep 2023 13:12)    pt seen and examine today  weak , confuse , lethargic.   INTERVAL HPI/OVERNIGHT EVENTS:     T(C): --  HR: --  BP: --  RR: --  SpO2: --  Wt(kg): --  I&O's Summary      REVIEW OF SYSTEMS:  unable to obtain  confuse     PHYSICAL EXAM:  GENERAL: NAD, weak +  HEAD:  Atraumatic, Normocephalic  EYES: EOMI, PERRLA, conjunctiva and sclera clear  ENMT:  dry mucous membranes  NECK: Supple, No JVD  NERVOUS SYSTEM:  awake  Oriented X0; Motor Strength 5/5 B/L upper and lower extremities; DTRs 2+ intact and symmetric  CHEST/LUNG:  percussion bilaterally  coarse ; No rales, rhonchi, wheezing,  HEART: Regular rate and rhythm; No murmurs,   ABDOMEN: Soft, Nontender, Nondistended; Bowel sounds present  EXTREMITIES:  2+ Peripheral Pulses, No clubbing, cyanosis, or edema  SKIN: No rashes or lesions    MEDICATIONS  (STANDING):  cholecalciferol 2000 Unit(s) Oral daily  dextrose 5%. 1000 milliLiter(s) (60 mL/Hr) IV Continuous <Continuous>  linezolid    Tablet 600 milliGRAM(s) Oral every 12 hours  polyethylene glycol 3350 17 Gram(s) Oral daily  potassium chloride    Tablet ER 20 milliEquivalent(s) Oral daily  risperiDONE   Tablet 0.25 milliGRAM(s) Oral two times a day    MEDICATIONS  (PRN):  acetaminophen     Tablet .. 650 milliGRAM(s) Oral every 6 hours PRN Temp greater or equal to 38C (100.4F)                      RADIOLOGY & ADDITIONAL TESTS:    Imaging Personally Reviewed:   no new test     Advance Directives:    dnr/dni   Palliative Care: hospices   Appropriate

## 2023-09-21 ENCOUNTER — TRANSCRIPTION ENCOUNTER (OUTPATIENT)
Age: 88
End: 2023-09-21

## 2023-09-21 PROCEDURE — 83605 ASSAY OF LACTIC ACID: CPT

## 2023-09-21 PROCEDURE — 92610 EVALUATE SWALLOWING FUNCTION: CPT

## 2023-09-21 PROCEDURE — 99285 EMERGENCY DEPT VISIT HI MDM: CPT | Mod: 25

## 2023-09-21 PROCEDURE — 96374 THER/PROPH/DIAG INJ IV PUSH: CPT

## 2023-09-21 PROCEDURE — 74176 CT ABD & PELVIS W/O CONTRAST: CPT | Mod: MA

## 2023-09-21 PROCEDURE — 87449 NOS EACH ORGANISM AG IA: CPT

## 2023-09-21 PROCEDURE — 81001 URINALYSIS AUTO W/SCOPE: CPT

## 2023-09-21 PROCEDURE — 87186 SC STD MICRODIL/AGAR DIL: CPT

## 2023-09-21 PROCEDURE — 85025 COMPLETE CBC W/AUTO DIFF WBC: CPT

## 2023-09-21 PROCEDURE — 87086 URINE CULTURE/COLONY COUNT: CPT

## 2023-09-21 PROCEDURE — 87899 AGENT NOS ASSAY W/OPTIC: CPT

## 2023-09-21 PROCEDURE — 87637 SARSCOV2&INF A&B&RSV AMP PRB: CPT

## 2023-09-21 PROCEDURE — 80053 COMPREHEN METABOLIC PANEL: CPT

## 2023-09-21 PROCEDURE — 87040 BLOOD CULTURE FOR BACTERIA: CPT

## 2023-09-21 PROCEDURE — 84145 PROCALCITONIN (PCT): CPT

## 2023-09-21 PROCEDURE — 97162 PT EVAL MOD COMPLEX 30 MIN: CPT

## 2023-09-21 PROCEDURE — 87077 CULTURE AEROBIC IDENTIFY: CPT

## 2023-09-21 PROCEDURE — 70450 CT HEAD/BRAIN W/O DYE: CPT | Mod: MA

## 2023-09-21 PROCEDURE — 36415 COLL VENOUS BLD VENIPUNCTURE: CPT

## 2023-09-21 PROCEDURE — 71045 X-RAY EXAM CHEST 1 VIEW: CPT

## 2023-09-21 PROCEDURE — 85730 THROMBOPLASTIN TIME PARTIAL: CPT

## 2023-09-21 PROCEDURE — 71250 CT THORAX DX C-: CPT | Mod: MA

## 2023-09-21 PROCEDURE — 93005 ELECTROCARDIOGRAM TRACING: CPT

## 2023-09-21 PROCEDURE — 85610 PROTHROMBIN TIME: CPT

## 2023-09-21 PROCEDURE — 99239 HOSP IP/OBS DSCHRG MGMT >30: CPT

## 2023-09-21 RX ORDER — MULTIVIT-MIN/FERROUS GLUCONATE 9 MG/15 ML
1 LIQUID (ML) ORAL
Refills: 0 | DISCHARGE

## 2023-09-21 RX ORDER — POLYETHYLENE GLYCOL 3350 17 G/17G
17 POWDER, FOR SOLUTION ORAL
Refills: 0 | DISCHARGE

## 2023-09-21 RX ORDER — KETOCONAZOLE 20 MG/G
1 AEROSOL, FOAM TOPICAL
Refills: 0 | DISCHARGE

## 2023-09-21 RX ORDER — CHOLECALCIFEROL (VITAMIN D3) 125 MCG
1 CAPSULE ORAL
Refills: 0 | DISCHARGE

## 2023-09-21 RX ORDER — FUROSEMIDE 40 MG
1 TABLET ORAL
Qty: 0 | Refills: 0 | DISCHARGE

## 2023-09-21 RX ORDER — RISPERIDONE 4 MG/1
1 TABLET ORAL
Qty: 0 | Refills: 0 | DISCHARGE
Start: 2023-09-21

## 2023-09-21 RX ORDER — RISPERIDONE 4 MG/1
1 TABLET ORAL
Refills: 0 | DISCHARGE

## 2023-09-21 RX ORDER — POTASSIUM CHLORIDE 20 MEQ
2 PACKET (EA) ORAL
Refills: 0 | DISCHARGE

## 2023-09-21 RX ORDER — POLYETHYLENE GLYCOL 3350 17 G/17G
17 POWDER, FOR SOLUTION ORAL
Qty: 0 | Refills: 0 | DISCHARGE
Start: 2023-09-21

## 2023-09-21 RX ADMIN — RISPERIDONE 0.25 MILLIGRAM(S): 4 TABLET ORAL at 04:26

## 2023-09-21 RX ADMIN — LINEZOLID 600 MILLIGRAM(S): 600 INJECTION, SOLUTION INTRAVENOUS at 04:26

## 2023-09-21 NOTE — CASE MANAGEMENT PROGRESS NOTE - NSCMPROGRESSNOTE_GEN_ALL_CORE
Patient is for transition to the [facility]. Patient is on Minced and moist diet here at the hospital. As per Tiny from the wellness department of the [facility], they do not do Minced and Moist, but they do  Chopped, Ground and Puree. CM call the dietician, spoke to  Keiko a bout which of the three mentioned diet is equivalent to Minced and Moist. Keiko stated that wound be the ground diet. 3122 updated and faxed to the [facility] of [place].

## 2023-09-21 NOTE — DISCHARGE NOTE NURSING/CASE MANAGEMENT/SOCIAL WORK - PATIENT PORTAL LINK FT
You can access the FollowMyHealth Patient Portal offered by [facility] by registering at the following website: [email or web address]. By joining [employer]’s FollowMyHealth portal, you will also be able to view your health information using other applications (apps) compatible with our system.

## 2023-09-21 NOTE — DISCHARGE NOTE NURSING/CASE MANAGEMENT/SOCIAL WORK - NSDCVIVACCINE_GEN_ALL_CORE_FT
influenza, injectable, quadrivalent, preservative free; 28-Sep-2019 19:10; Cheyanne Velazquez (RN); [employer]; [id number] (Exp. Date: 29-Sep-2019); IntraMuscular; Deltoid Left.; 0.5 milliLiter(s); VIS (VIS Published: 15-Aug-2019, VIS Presented: 28-Sep-2019);

## 2023-09-22 ENCOUNTER — TRANSCRIPTION ENCOUNTER (OUTPATIENT)
Age: 88
End: 2023-09-22

## 2023-10-03 NOTE — ED ADULT NURSE NOTE - AGENT'S NAME
Medication: Amlodipine 5mg   Last office visit date: 01/09/2023  Medication Refill Protocol Failed.  Failed criteria: See below. . Sent to clinician to review.     Calcium Channel Blockers Refill Protocol - 12 Month Protocol Failed 10/02/2023 06:14 PM   Protocol Details  Last BP was under 140/90 or if patient has diabetes, CAD, or PVD, BP under 130/80 in past year -- IF CRITERIA FAILED REFER TO PROTOCOL DETAILS    eGFR within last 12 months looking at last value    Seen by prescribing provider or same department within the last 12 months or has a future appt in 3 months - IF FAILED PLEASE LOOK AT CHART REVIEW FOR LAST VISIT AND PROCEED ACCORDINGLY    Last recorded pulse is greater than 49    Medication (including dose and sig) on current meds list       
Elvira huber

## 2023-11-13 NOTE — ED ADULT NURSE NOTE - NSICDXPASTMEDICALHX_GEN_ALL_CORE_FT
PAST MEDICAL HISTORY:  Age Related Osteoporosis     Fungal infection left foot ; tx with cream ; x 3 weeks ; pt reports improvement    Heart murmur     History of Hypertension     [place] (hard of hearing)     MVP (mitral valve prolapse)     Otosclerosis      sinus symptoms/post-nasal discharge
